# Patient Record
Sex: MALE | Race: WHITE | NOT HISPANIC OR LATINO | Employment: OTHER | URBAN - METROPOLITAN AREA
[De-identification: names, ages, dates, MRNs, and addresses within clinical notes are randomized per-mention and may not be internally consistent; named-entity substitution may affect disease eponyms.]

---

## 2022-02-03 ENCOUNTER — TELEPHONE (OUTPATIENT)
Dept: PSYCHOLOGY | Facility: CLINIC | Age: 67
End: 2022-02-03
Payer: COMMERCIAL

## 2022-02-03 NOTE — TELEPHONE ENCOUNTER
[facility] Telephone Intake    Date:  February 3, 2022  Client Name:  Ramin Murcia  MRN:  [id number]   :  1955       Age:  66 year old     Suggested Program:  [facility]

## 2022-02-06 ENCOUNTER — HEALTH MAINTENANCE LETTER (OUTPATIENT)
Age: 67
End: 2022-02-06

## 2022-02-16 ENCOUNTER — TELEPHONE (OUTPATIENT)
Dept: PSYCHOLOGY | Facility: CLINIC | Age: 67
End: 2022-02-16
Payer: COMMERCIAL

## 2022-02-28 ENCOUNTER — VIRTUAL VISIT (OUTPATIENT)
Dept: PSYCHOLOGY | Facility: CLINIC | Age: 67
End: 2022-02-28
Payer: COMMERCIAL

## 2022-02-28 ENCOUNTER — TELEPHONE (OUTPATIENT)
Dept: PSYCHIATRY | Facility: CLINIC | Age: 67
End: 2022-02-28

## 2022-02-28 DIAGNOSIS — F91.8 CONDUCT DISORDER, UNDIFFERENTIATED TYPE: Primary | ICD-10-CM

## 2022-02-28 PROCEDURE — 90791 PSYCH DIAGNOSTIC EVALUATION: CPT | Mod: GT | Performed by: PSYCHOLOGIST

## 2022-02-28 PROCEDURE — 99207 PR NO CHARGE LOS: CPT | Performed by: PSYCHOLOGIST

## 2022-02-28 NOTE — TELEPHONE ENCOUNTER
Patient called at the request of Dr. Bacon to schedule new consult with Quan Richards.     This writer instructed patient to work with MA to schedule and complete any additional paperwork.

## 2022-03-03 ENCOUNTER — TELEPHONE (OUTPATIENT)
Dept: PSYCHIATRY | Facility: CLINIC | Age: 67
End: 2022-03-03
Payer: COMMERCIAL

## 2022-03-06 ENCOUNTER — MEDICAL CORRESPONDENCE (OUTPATIENT)
Dept: HEALTH INFORMATION MANAGEMENT | Facility: CLINIC | Age: 67
End: 2022-03-06
Payer: COMMERCIAL

## 2022-03-14 ENCOUNTER — VIRTUAL VISIT (OUTPATIENT)
Dept: PSYCHOLOGY | Facility: CLINIC | Age: 67
End: 2022-03-14
Payer: COMMERCIAL

## 2022-03-14 DIAGNOSIS — F91.8 CONDUCT DISORDER, UNDIFFERENTIATED TYPE: Primary | ICD-10-CM

## 2022-03-14 PROCEDURE — 99207 PR NO CHARGE LOS: CPT | Performed by: PSYCHOLOGIST

## 2022-03-14 PROCEDURE — 90837 PSYTX W PT 60 MINUTES: CPT | Performed by: PSYCHOLOGIST

## 2022-03-21 NOTE — PROGRESS NOTES
[facility] -  Case Progress Note    Date of Service: Mar 14, 2022  Client Name: Ramin Murcia  YOB: 1955  MRN:  [id number]  Treating Provider: Yenifer Bacon, PhD RADHA  Type of Session: Individual  Present in Session: 3:00  Number of Minutes:  3:55    In person.    Current Symptoms/Status:  Patient still struggling with daily urges.  Feels that he comes close to boundary violations.  Has not been able to stay within some boundaries.    Progress Toward Treatment Goals:   Just starting treatment.    Intervention: Modality and Description:  Interpersonal, relapse presention, CBT    Response to Intervention:  Continued to explore dynamics related to his CSB.  Seems that his half-way has caused existential crisis.  Lost purpose and meaning.  Finds himsle searching for his youthful self.  Talked about ways to gain impulse control in the short term.  Has appointment with Quan Richards.    Assignment:  Read Daring Greatly.    Interactive Complexity:  There are four specific communication difficulties that complicate the work of the primary psychiatric procedure.  Interactive complexity (+81087) may be reported when at least one of these difficulties is present.    Communication difficulties present during current the psychiatric procedure include:  1. None.    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.8) Other Specified Disruptive, Impulse Control, and Conduct Disorder (Hypersexual Disorder)      Plan / Need for Future Services:  Return for therapy in 2 weeks.      Yenifer Bacon, PhD LP

## 2022-03-24 ENCOUNTER — VIRTUAL VISIT (OUTPATIENT)
Dept: PSYCHIATRY | Facility: CLINIC | Age: 67
End: 2022-03-24
Payer: COMMERCIAL

## 2022-03-24 DIAGNOSIS — F91.8 CONDUCT DISORDER, UNDIFFERENTIATED TYPE: Primary | ICD-10-CM

## 2022-03-24 PROCEDURE — 99203 OFFICE O/P NEW LOW 30 MIN: CPT | Mod: GT | Performed by: PHYSICIAN ASSISTANT

## 2022-03-24 RX ORDER — NALTREXONE HYDROCHLORIDE 50 MG/1
TABLET, FILM COATED ORAL
Qty: 30 TABLET | Refills: 1 | Status: SHIPPED | OUTPATIENT
Start: 2022-03-24 | End: 2022-05-25

## 2022-03-24 RX ORDER — TADALAFIL 5 MG/1
1 TABLET ORAL DAILY
COMMUNITY
Start: 2021-09-01 | End: 2024-03-19

## 2022-03-24 ASSESSMENT — PATIENT HEALTH QUESTIONNAIRE - PHQ9
5. POOR APPETITE OR OVEREATING: SEVERAL DAYS
SUM OF ALL RESPONSES TO PHQ QUESTIONS 1-9: 3

## 2022-03-24 ASSESSMENT — ANXIETY QUESTIONNAIRES
3. WORRYING TOO MUCH ABOUT DIFFERENT THINGS: NOT AT ALL
5. BEING SO RESTLESS THAT IT IS HARD TO SIT STILL: SEVERAL DAYS
6. BECOMING EASILY ANNOYED OR IRRITABLE: SEVERAL DAYS
GAD7 TOTAL SCORE: 3
2. NOT BEING ABLE TO STOP OR CONTROL WORRYING: NOT AT ALL
1. FEELING NERVOUS, ANXIOUS, OR ON EDGE: NOT AT ALL
7. FEELING AFRAID AS IF SOMETHING AWFUL MIGHT HAPPEN: NOT AT ALL

## 2022-03-25 ASSESSMENT — ANXIETY QUESTIONNAIRES: GAD7 TOTAL SCORE: 3

## 2022-03-25 NOTE — PATIENT INSTRUCTIONS
1)Naltrexone 50 mg: Take 1/2 tablet daily for 2 weeks, then take 1 tablet daily.     2)See me in 1 month.  Contact the clinic with any questions or concerns.
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
## 2022-04-04 ENCOUNTER — PSYCHE (OUTPATIENT)
Dept: PSYCHOLOGY | Facility: CLINIC | Age: 67
End: 2022-04-04
Payer: COMMERCIAL

## 2022-04-04 DIAGNOSIS — F91.8 CONDUCT DISORDER, UNDIFFERENTIATED TYPE: Primary | ICD-10-CM

## 2022-04-04 PROCEDURE — 99207 PR NO CHARGE LOS: CPT | Performed by: PSYCHOLOGIST

## 2022-04-04 PROCEDURE — 90837 PSYTX W PT 60 MINUTES: CPT | Performed by: PSYCHOLOGIST
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
## 2022-04-25 ENCOUNTER — PSYCHE (OUTPATIENT)
Dept: PSYCHOLOGY | Facility: CLINIC | Age: 67
End: 2022-04-25
Payer: COMMERCIAL

## 2022-04-25 DIAGNOSIS — F91.8 CONDUCT DISORDER, UNDIFFERENTIATED TYPE: Primary | ICD-10-CM

## 2022-04-25 PROCEDURE — 90837 PSYTX W PT 60 MINUTES: CPT | Performed by: PSYCHOLOGIST

## 2022-04-25 PROCEDURE — 99207 PR NO CHARGE LOS: CPT | Performed by: PSYCHOLOGIST

## 2022-04-28 ENCOUNTER — VIRTUAL VISIT (OUTPATIENT)
Dept: PSYCHIATRY | Facility: CLINIC | Age: 67
End: 2022-04-28
Payer: COMMERCIAL

## 2022-04-28 DIAGNOSIS — F91.8 CONDUCT DISORDER, UNDIFFERENTIATED TYPE: Primary | ICD-10-CM

## 2022-04-28 PROCEDURE — 99214 OFFICE O/P EST MOD 30 MIN: CPT | Mod: GT | Performed by: PHYSICIAN ASSISTANT

## 2022-04-28 ASSESSMENT — ANXIETY QUESTIONNAIRES
5. BEING SO RESTLESS THAT IT IS HARD TO SIT STILL: NOT AT ALL
1. FEELING NERVOUS, ANXIOUS, OR ON EDGE: SEVERAL DAYS
3. WORRYING TOO MUCH ABOUT DIFFERENT THINGS: NOT AT ALL
7. FEELING AFRAID AS IF SOMETHING AWFUL MIGHT HAPPEN: NOT AT ALL
GAD7 TOTAL SCORE: 2
7. FEELING AFRAID AS IF SOMETHING AWFUL MIGHT HAPPEN: NOT AT ALL
GAD7 TOTAL SCORE: 2
6. BECOMING EASILY ANNOYED OR IRRITABLE: SEVERAL DAYS
2. NOT BEING ABLE TO STOP OR CONTROL WORRYING: NOT AT ALL
4. TROUBLE RELAXING: NOT AT ALL

## 2022-04-28 ASSESSMENT — PATIENT HEALTH QUESTIONNAIRE - PHQ9
SUM OF ALL RESPONSES TO PHQ QUESTIONS 1-9: 3
10. IF YOU CHECKED OFF ANY PROBLEMS, HOW DIFFICULT HAVE THESE PROBLEMS MADE IT FOR YOU TO DO YOUR WORK, TAKE CARE OF THINGS AT HOME, OR GET ALONG WITH OTHER PEOPLE: SOMEWHAT DIFFICULT
SUM OF ALL RESPONSES TO PHQ QUESTIONS 1-9: 3

## 2022-04-29 ASSESSMENT — ANXIETY QUESTIONNAIRES: GAD7 TOTAL SCORE: 2

## 2022-04-29 ASSESSMENT — PATIENT HEALTH QUESTIONNAIRE - PHQ9: SUM OF ALL RESPONSES TO PHQ QUESTIONS 1-9: 3

## 2022-04-29 NOTE — PATIENT INSTRUCTIONS
1)Naltrexone 50 mg: Continue 1 tablet daily.  I recommend taking it at bedtime.     2)See me in 3 months.  Contact my office with any questions or concerns.

## 2022-04-29 NOTE — PROGRESS NOTES
Video start time: 730  Video end time: 740    Telemedicine Visit: The patient's condition can be safely assessed and treated via synchronous audio and visual telemedicine encounter.      Reason for Telemedicine Visit: Services only offered telehealth    Originating Site (Patient Location): Patient's home    Distant Site (Provider Location): Provider Remote Setting- Home Office    Consent:  The patient/guardian has verbally consented to: the potential risks and benefits of telemedicine (video visit) versus in person care; bill my insurance or make self-payment for services provided; and responsibility for payment of non-covered services.     Mode of Communication:  Video Conference via [employer]    As the provider I attest to compliance with applicable laws and regulations related to telemedicine.    [facility] Management    Name:  Darlene Murcia   Aliases:  DARLENE MURCIA  :  1955   MRN:  [id number]  Treating Provider: Quan Richards PA-C EdD     Medications at start of visit:  Outpatient Medications Prior to Visit   Medication Sig Dispense Refill     naltrexone (DEPADE/REVIA) 50 MG tablet Take 1/2 tablet daily for 2 weeks, then take 1 tablet daily 30 tablet 1     tadalafil (CIALIS) 5 MG tablet Take 1 tablet by mouth daily       No facility-administered medications prior to visit.       Allergies:  No Known Allergies    Today's Visit    Current Complaint: Darlene presents for a recheck.  At their last appointment we initiated naltrexone, and he is reporting a good response. He is having some dizziness and fatigue, however they are mostly manageable. He has also experienced some delayed ejaculation. The patient is not endorsing suicidal/homicidal ideation, active planning, intent or means.  The patient is not endorsing s/s suggestive of hypomania/gera or psychosis.  The patient is endorsing good compliance with and efficacy of their medication regimen.     Review of Systems: A review of the following systems was  negative: Opthalmic, ENT, Cardiovascular, Gastrointestinal, Genitourinary, Musculoskeletal, Integumentary, Neurological, Endocrine, Respiratory, Hematologic, Immunologic      Chemical History: Denies usage of and cravings for alcohol, cannabis, heroin, methamphetamine, cocaine, prescription opioids/benzodiazepines.      Social History: No interval change    Mental Status Exam: The patient's orientation, memory,  Attention, language and fund of knowledge are at their usual best baseline.  Grooming/Hygiene: adequate  Eye Contact: normal  Psychomotor: normal  Observed mood and affect: appropriate  Judgment: intact, with thoughtful decision making and insight  Speech: normal rate, rhythm, tone and volume  Thought processes: normal, with normal rate of thought  Associations:  No deficiency  Abnormal Thoughts: none      Assessment: This is a 65 yo man who carries the diagnosis of Conduct Disorder. The patient's questions were answered to their satisfaction regarding the plan as outlined below. Side effects, risks/benefits/alternatives regarding all psychiatric medications were discussed with the patient in detail.          Plan:    There are no Patient Instructions on file for this visit.     Total face-to-face time: 30 min    Counseling/Coordination of care greater than 50%.    Counseling/Coordination of care included: Educational counseling regarding psychiatric medications, side effects, interactions.                           Answers for HPI/ROS submitted by the patient on 4/28/2022  If you checked off any problems, how difficult have these problems made it for you to do your work, take care of things at home, or get along with other people?: Somewhat difficult  PHQ9 TOTAL SCORE: 3  OYNY 7 TOTAL SCORE: 2
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
## 2022-05-06 ENCOUNTER — MYC MEDICAL ADVICE (OUTPATIENT)
Dept: FAMILY MEDICINE | Facility: CLINIC | Age: 67
End: 2022-05-06
Payer: COMMERCIAL

## 2022-05-09 ENCOUNTER — PSYCHE (OUTPATIENT)
Dept: PSYCHOLOGY | Facility: CLINIC | Age: 67
End: 2022-05-09
Payer: COMMERCIAL

## 2022-05-09 DIAGNOSIS — F43.23 ADJUSTMENT DISORDER WITH MIXED ANXIETY AND DEPRESSED MOOD: ICD-10-CM

## 2022-05-09 DIAGNOSIS — F91.8 CONDUCT DISORDER, UNDIFFERENTIATED TYPE: Primary | ICD-10-CM

## 2022-05-09 PROCEDURE — 90837 PSYTX W PT 60 MINUTES: CPT | Performed by: PSYCHOLOGIST

## 2022-05-09 PROCEDURE — 99207 PR NO CHARGE LOS: CPT | Performed by: PSYCHOLOGIST

## 2022-05-09 NOTE — PROGRESS NOTES
[facility] -  Case Progress Note    5/09/22    Client Name: Ramin Murcia  YOB: 1955  MRN:  [id number]  Treating Provider: Yenifer Bacon, PhD LP  Type of Session: Conjoint  Present in Session: Patient and wife   Time: 60 min.    In person.    Current Symptoms/Status:  Patient has lesswith daily urges.  Feels significant improvement since starting Naltrexone.    Now taking 50 mg.  Reduced urges.  Feels less antsy.    Progress Toward Treatment Goals:   Just starting treatment.    Intervention: Modality and Description:  Interpersonal, relapse presention, CBT    Response to Intervention:  Continued to explore dynamics related to his CSB.  Involved partner in this conjoint session.  Was able to identify earlier symptoms.  Triggered by miscarriage, jail.    Talked about tx plan.  Is ready to join group.    Notes his narcissistic traits.      Started naltrexone and has noticed signficant improvement.    Still feels that is improving.    Looks at self at the mirror - always been like that.  Compulsion    Screen time - all the time.  Compulsive.    Drinking - still have issues with it.  Increased over time.    Family history of impulse control tanner. With aging.    Is structuring his time.  Building support system.     How much should he go into issues with father?  Next youngest brother - was also abused.  Guilt about not protecting him.    MENTAL STATUS EXAM  Appearance: appropriate  Attitude: cooperative  Behavior: normal  Eyre Contact: normal  Speech: normal  Orientation: oreinted to person , place, time and situation  Mood:  admits sadness and anxiety most of time  Affect: Mood Congruient  Thought Process: clear  Suicidal Ideation: none  Hallucination: no    Assignment:  Read Daring Greatly.    Interactive Complexity:  There are four specific communication difficulties that complicate the work of the primary psychiatric procedure.  Interactive complexity (+01128) may be reported when at  least one of these difficulties is present.    Communication difficulties present during current the psychiatric procedure include:  1. None.    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.8) Other Specified Disruptive, Impulse Control, and Conduct Disorder (Hypersexual Disorder)    Adjustment Disorder with mixed emotional  features    TREATMENT PLAN  Date of Treatment Plan 22  Name: Ramin Murcia  : 1955  Medical Record Number: [id number]  Treating Provider: Yenifer Bacon, PhD      Current Status:  Depression/Mood:  Sad  Increased Crying  Decreased Interest  Decreased Self-Esteem  Guilt  Hopelessness/Helplessness  Dysphoria    Anxiety/Panic:  Worry  Intrusive Thoughts    Thought:   Reports no problems or symptoms at this time    Sensorium:  Reports no problems or symptoms at this time    Behavior/Health:  Compulsive  Relationship Problems    Chemical Use:  Denies both Alcohol and Drug Abuse    Sexual Problems:  Compulsive sexual behavior    Suicide Risk Assessment:  Assessed Level of Immediate Risk:  YES  Ideation:  NO  Plan:  NO  Means:  NO  Intent:  YES    Homicide Risk Assessment:  Assessed Level of Immediate Risk:  NO  Ideation:  NO  Plan:  NO  Means:  NO  Intent:  NO    Impact of Symptoms on Function:  Decreased Social/Family Function    DSM-5 Diagnoses:       Screening Questionnaires:  Please indicate whether the following screening questionnaires have been completed:  CAGE: Yes  PHQ-9: Yes    YONY-7: Yes  WHODAS: No    Problem(s):  1. Compulsive sexual behavior  2. Depresssion  3. Existential crisis    Short-Long Term Goals  Decrease Symptoms  Increase Coping  Change Behavior  Change Cognitions  Increase Psychosocial/Support Functioning  Monitor Psych Status  Improve Communication  Enhance Relationships    Interventions  [place] [facility]  Cognitive-Behavioral Therapy  Medication Management Referral  Bibliotherapy    Expected Outcomes and Prognosis  Expected improvement    Anticipated Treatment  Duration:  Unknown    Frequency of Sessions  2 x / Month    Progress Update (for Plan Update Only)  NA:  1st Treatment Planning    Current Psychoactive Medications:  See Psychiatric Treatment Plan  Discharge & Aftercare Goals: To Be Determined.  Care Coordination: Yes    Consent to Treatment:   Patient participated in this treatment planning process and indicated verbal agreement with the above treatment plan.  If patient doesn't sign, indicate why: Telehealth visit due to COVID19 pandemic    Patient Signature: Ramin Murcia   Date: 5-9-22       Provider Signature: Yenifer Bacon, PhD,   Date: 5-9-22

## 2022-05-23 DIAGNOSIS — F91.8 CONDUCT DISORDER, UNDIFFERENTIATED TYPE: ICD-10-CM

## 2022-05-23 RX ORDER — NALTREXONE HYDROCHLORIDE 50 MG/1
TABLET, FILM COATED ORAL
Qty: 90 TABLET | Refills: 0 | Status: CANCELLED | OUTPATIENT
Start: 2022-05-23

## 2022-05-23 NOTE — TELEPHONE ENCOUNTER
Requested Medication: Naltrexpone  Dose:  50mg  Quantity: 90  Refills: 0    Take 1 tablet daily    Last seen at [facility]: 4/28 - 3 mo follow up  -   Next Appointment with Provider: 8/4        Dior Mike CMA

## 2022-05-25 DIAGNOSIS — F91.8 CONDUCT DISORDER, UNDIFFERENTIATED TYPE: ICD-10-CM

## 2022-05-25 RX ORDER — NALTREXONE HYDROCHLORIDE 50 MG/1
TABLET, FILM COATED ORAL
Qty: 90 TABLET | Refills: 0 | Status: SHIPPED | OUTPATIENT
Start: 2022-05-25 | End: 2022-06-23

## 2022-05-25 NOTE — TELEPHONE ENCOUNTER
Requested Medication:Naltrexone 50mg  Dose: 50mg  Quantity:90  Refills: 0    Take 1 tablet daily    Last seen at [facility]: 428/22 -  Next Appointment with Provider: 8/4    Patient requesting a 90 day refill. Rx changed to reflect request      Dior Mike CMA

## 2022-06-13 ENCOUNTER — OFFICE VISIT (OUTPATIENT)
Dept: PSYCHOLOGY | Facility: CLINIC | Age: 67
End: 2022-06-13
Payer: COMMERCIAL

## 2022-06-13 DIAGNOSIS — F43.23 ADJUSTMENT DISORDER WITH MIXED ANXIETY AND DEPRESSED MOOD: ICD-10-CM

## 2022-06-13 DIAGNOSIS — F91.8 CONDUCT DISORDER, UNDIFFERENTIATED TYPE: Primary | ICD-10-CM

## 2022-06-13 PROCEDURE — 90837 PSYTX W PT 60 MINUTES: CPT | Performed by: PSYCHOLOGIST

## 2022-06-13 PROCEDURE — 99207 PR NO CHARGE LOS: CPT | Performed by: PSYCHOLOGIST

## 2022-06-13 NOTE — PROGRESS NOTES
[facility] -  Case Progress Note    6/13/22    Client Name: Ramin Murcia  YOB: 1955  MRN:  [id number]  Treating Provider: Yenifer Bacon, PhD LP  Type of Session: Individual  Present in Session: Patient   Time: 55 min.    In person.    Current Symptoms/Status:  Patient continues with less with daily urges.  Feels significant improvement since starting Naltrexone.    Now taking 50 mg.  Reduced urges.  Feels less antsy.    Progress Toward Treatment Goals:   Just starting treatment.  Feeling much better.  Less urges. Staying within boundaries.    Intervention: Modality and Description:  Interpersonal, relapse presention, CBT    Response to Intervention:  Continued to explore dynamics related to his CSB.     Has been working on mentoring and involved   Giving back to community.    Talked about tx plan.  Is ready to join group.    Screen time - all the time.  Seems to be a little better.  Encouraged working on that more.  Compulsive.    Drinking - still have issues with it.  Increased over time.    How much should he go into issues with father?  Next youngest brother - was also abused.  Guilt about not protecting him.  Tried to talk to uncle but he was unresponsive.    MENTAL STATUS EXAM  Appearance: appropriate  Attitude: cooperative  Behavior: normal  Eyre Contact: normal  Speech: normal  Orientation: oreinted to person , place, time and situation  Mood:  admits sadness and anxiety most of time  Affect: Mood Congruient  Thought Process: clear  Suicidal Ideation: none  Hallucination: no    Assignment:  Read Daring Greatly.    Interactive Complexity:  There are four specific communication difficulties that complicate the work of the primary psychiatric procedure.  Interactive complexity (+97389) may be reported when at least one of these difficulties is present.    Communication difficulties present during current the psychiatric procedure include:  1. None.    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.8) Other  Specified Disruptive, Impulse Control, and Conduct Disorder (Hypersexual Disorder)    Adjustment Disorder with mixed emotional  features    TREATMENT PLAN  Date of Treatment Plan 22  Name: Ramin Murcia  : 1955  Medical Record Number: [id number]  Treating Provider: Yenifer Bacon, PhD      Current Status:  Depression/Mood:  Sad  Increased Crying  Decreased Interest  Decreased Self-Esteem  Guilt  Hopelessness/Helplessness  Dysphoria    Anxiety/Panic:  Worry  Intrusive Thoughts    Thought:   Reports no problems or symptoms at this time    Sensorium:  Reports no problems or symptoms at this time    Behavior/Health:  Compulsive  Relationship Problems    Chemical Use:  Denies both Alcohol and Drug Abuse    Sexual Problems:  Compulsive sexual behavior    Suicide Risk Assessment:  Assessed Level of Immediate Risk:  YES  Ideation:  NO  Plan:  NO  Means:  NO  Intent:  YES    Homicide Risk Assessment:  Assessed Level of Immediate Risk:  NO  Ideation:  NO  Plan:  NO  Means:  NO  Intent:  NO    Impact of Symptoms on Function:  Decreased Social/Family Function    DSM-5 Diagnoses:       Screening Questionnaires:  Please indicate whether the following screening questionnaires have been completed:  CAGE: Yes  PHQ-9: Yes    YONY-7: Yes  WHODAS: No    Problem(s):  1. Compulsive sexual behavior  2. Depresssion  3. Existential crisis    Short-Long Term Goals  Decrease Symptoms  Increase Coping  Change Behavior  Change Cognitions  Increase Psychosocial/Support Functioning  Monitor Psych Status  Improve Communication  Enhance Relationships    Interventions  [place] [facility]  Cognitive-Behavioral Therapy  Medication Management Referral  Bibliotherapy    Expected Outcomes and Prognosis  Expected improvement    Anticipated Treatment Duration:  Unknown    Frequency of Sessions  2 x / Month    Progress Update (for Plan Update Only)  NA:  1st Treatment Planning    Current Psychoactive Medications:  See Psychiatric Treatment  Plan  Discharge & Aftercare Goals: To Be Determined.  Care Coordination: Yes    Consent to Treatment:   Patient participated in this treatment planning process and indicated verbal agreement with the above treatment plan.  If patient doesn't sign, indicate why: Telehealth visit due to COVID19 pandemic    Patient Signature: Ramin Murcia   Date: 5-9-22       Provider Signature: Yenifer Bacon, PhD, LP  Date: 5-9-22

## 2022-06-23 DIAGNOSIS — F91.8 CONDUCT DISORDER, UNDIFFERENTIATED TYPE: ICD-10-CM

## 2022-06-23 RX ORDER — NALTREXONE HYDROCHLORIDE 50 MG/1
TABLET, FILM COATED ORAL
Qty: 90 TABLET | Refills: 0 | Status: SHIPPED | OUTPATIENT
Start: 2022-06-23 | End: 2022-08-03

## 2022-06-23 NOTE — TELEPHONE ENCOUNTER
Pharmacy states they never received the 5/25/22 Naltrexone prescription.  Please resend.    Dior Mike,CMA

## 2022-07-05 ENCOUNTER — VIRTUAL VISIT (OUTPATIENT)
Dept: PSYCHOLOGY | Facility: CLINIC | Age: 67
End: 2022-07-05
Payer: COMMERCIAL

## 2022-07-05 DIAGNOSIS — F43.23 ADJUSTMENT DISORDER WITH MIXED ANXIETY AND DEPRESSED MOOD: Primary | ICD-10-CM

## 2022-07-05 DIAGNOSIS — F91.8 CONDUCT DISORDER, UNDIFFERENTIATED TYPE: ICD-10-CM

## 2022-07-05 PROCEDURE — 99207 PR NO CHARGE LOS: CPT | Performed by: PSYCHOLOGIST

## 2022-07-05 PROCEDURE — 90853 GROUP PSYCHOTHERAPY: CPT | Mod: GT | Performed by: PSYCHOLOGIST

## 2022-07-05 NOTE — PROGRESS NOTES
" [facility] -  Case Progress Note     7/05/22       Client Name: Ramin Murcia  YOB: 1955  MRN:  [id number]  Treating Provider: Yenifer Bacon, PhD LP  Type of Session: Individual  Present in Session: Patient   Time: 55 min.     Video start time: 1:00  Video end time: 1:55    Telemedicine Visit: The patient's condition can be safely assessed and treated via synchronous audio and visual telemedicine encounter.      Reason for Telemedicine Visit: Patient has requested telehealth visit    Originating Site (Patient Location): Patient's home    Distant Site (Provider Location): Provider Remote Setting- Home Office    Consent:  The patient/guardian has verbally consented to: the potential risks and benefits of telemedicine (video visit) versus in person care; bill my insurance or make self-payment for services provided; and responsibility for payment of non-covered services.     Mode of Communication:  Video Conference via [employer]    As the provider I attest to compliance with applicable laws and regulations related to telemedicine.         Current Symptoms/Status:  Patient continues with less with daily urges.  Feels significant improvement since starting Naltrexone.    Now taking 50 mg.  Reduced urges.  Feels less antsy.     Progress Toward Treatment Goals:   Feeling much better. Understanding the source of his hypersexuality.  Less urges. Staying within boundaries.     Intervention: Modality and Description:  Interpersonal, relapse presention, CBT     Response to Intervention:  Continued to explore dynamics related to his CSB.  Sees \"mid-life\" crisis - delayed.  Existential crisis.  Loss of purpose and meaning.     Has bene getting busier with boards, mentoring..     Talked about tx plan.  Is ready to join group.  Will decide shortly which group he can join.    Has connected with Dr. Cole.           How much should he go into issues with father?  Next youngest brother - was also " abused.  Guilt about not protecting him.  Tried to talk to uncle but he was unresponsive.     MENTAL STATUS EXAM  Appearance: appropriate  Attitude: cooperative  Behavior: normal  Eyre Contact: normal  Speech: normal  Orientation: oreinted to person , place, time and situation  Mood:  admits sadness and anxiety most of time  Affect: Mood Congruient  Thought Process: clear  Suicidal Ideation: none  Hallucination: no     Assignment:  Read Daring Greatly.     Interactive Complexity:  There are four specific communication difficulties that complicate the work of the primary psychiatric procedure.  Interactive complexity (+72483) may be reported when at least one of these difficulties is present.     Communication difficulties present during current the psychiatric procedure include:  1. None.     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.8) Other Specified Disruptive, Impulse Control, and Conduct Disorder (Hypersexual Disorder)    Adjustment Disorder with mixed emotional  features     TREATMENT PLAN  Date of Treatment Plan 22  Name: Ramin Murcia  : 1955  Medical Record Number: [id number]  Treating Provider: Yenifer Bacon, PhD        Current Status:  Depression/Mood:  Sad  Increased Crying  Decreased Interest  Decreased Self-Esteem  Guilt  Hopelessness/Helplessness  Dysphoria     Anxiety/Panic:  Worry  Intrusive Thoughts     Thought:   Reports no problems or symptoms at this time     Sensorium:  Reports no problems or symptoms at this time     Behavior/Health:  Compulsive  Relationship Problems     Chemical Use:  Denies both Alcohol and Drug Abuse     Sexual Problems:  Compulsive sexual behavior     Suicide Risk Assessment:  Assessed Level of Immediate Risk:  YES  Ideation:  NO  Plan:  NO  Means:  NO  Intent:  YES     Homicide Risk Assessment:  Assessed Level of Immediate Risk:  NO  Ideation:  NO  Plan:  NO  Means:  NO  Intent:  NO     Impact of Symptoms on Function:  Decreased Social/Family Function     DSM-5 Diagnoses:          Screening Questionnaires:  Please indicate whether the following screening questionnaires have been completed:  CAGE: Yes  PHQ-9: Yes    YONY-7: Yes  WHODAS: No     Problem(s):  1. Compulsive sexual behavior  2. Depresssion  3. Existential crisis     Short-Long Term Goals  Decrease Symptoms  Increase Coping  Change Behavior  Change Cognitions  Increase Psychosocial/Support Functioning  Monitor Psych Status  Improve Communication  Enhance Relationships     Interventions  [place] [facility]  Cognitive-Behavioral Therapy  Medication Management Referral  Bibliotherapy     Expected Outcomes and Prognosis  Expected improvement     Anticipated Treatment Duration:  Unknown     Frequency of Sessions  2 x / Month     Progress Update (for Plan Update Only)  NA:  1st Treatment Planning     Current Psychoactive Medications:  See Psychiatric Treatment Plan  Discharge & Aftercare Goals: To Be Determined.  Care Coordination: Yes     Consent to Treatment:   Patient participated in this treatment planning process and indicated verbal agreement with the above treatment plan.  If patient doesn't sign, indicate why: Telehealth visit due to COVID19 pandemic     Patient Signature: Ramin Murcia   Date: 5-9-22        Provider Signature: Yenifer Bacon, PhD,   Date: 5-9-22

## 2022-07-14 ENCOUNTER — VIRTUAL VISIT (OUTPATIENT)
Dept: PSYCHOLOGY | Facility: CLINIC | Age: 67
End: 2022-07-14
Payer: COMMERCIAL

## 2022-07-14 DIAGNOSIS — F43.23 ADJUSTMENT DISORDER WITH MIXED ANXIETY AND DEPRESSED MOOD: ICD-10-CM

## 2022-07-14 DIAGNOSIS — F91.8 CONDUCT DISORDER, UNDIFFERENTIATED TYPE: Primary | ICD-10-CM

## 2022-07-14 PROCEDURE — 99207 PR NO CHARGE LOS: CPT

## 2022-07-14 PROCEDURE — 90853 GROUP PSYCHOTHERAPY: CPT | Mod: GT

## 2022-07-14 NOTE — PROGRESS NOTES
[facility] -  Case Progress Note     7/14/22       Client Name: Ramin Murcia  YOB: 1955  MRN:  [id number]  Treating Provider: Yenifer Bacon, PhD LP  Type of Session:Group       Video start time: 4:00  Video end time: 6:00    Telemedicine Visit: The patient's condition can be safely assessed and treated via synchronous audio and visual telemedicine encounter.      Reason for Telemedicine Visit: Patient has requested telehealth visit    Originating Site (Patient Location): Patient's home    Distant Site (Provider Location): Provider Remote Setting- Home Office    Consent:  The patient/guardian has verbally consented to: the potential risks and benefits of telemedicine (video visit) versus in person care; bill my insurance or make self-payment for services provided; and responsibility for payment of non-covered services.     Mode of Communication:  Video Conference via [employer]    As the provider I attest to compliance with applicable laws and regulations related to telemedicine.    Current Symptoms/Status:  Patient continues with less with daily urges.  Feels significant improvement since starting Naltrexone.    Now taking 50 mg.  Reduced urges.  Feels less antsy.     Progress Toward Treatment Goals:   Staying within boundaries. Some urges but managing them.     Intervention: Modality and Description:  Interpersonal, relapse prevention, CBT     Response to Intervention:  Processed how he is exploring antecedents to CSB and factors contributing to compulsivity. He reflected on family dynamics, particularly with his father, that contributed to issues with power/control. He shared adverse childhood experiences that may contribute to core fuels. He shared feedback with group members, was receptive to feedback, and maintained attention throughout group.      MENTAL STATUS EXAM  Appearance: appropriate  Attitude: cooperative  Behavior: normal  Eyre Contact: normal  Speech: normal  Orientation:  oreinted to person , place, time and situation  Mood:  admits sadness and anxiety most of time  Affect: Mood Congruient  Thought Process: clear  Suicidal Ideation: none  Hallucination: no     Assignment:  Read Daring Greatly.     Interactive Complexity:  There are four specific communication difficulties that complicate the work of the primary psychiatric procedure.  Interactive complexity (+75067) may be reported when at least one of these difficulties is present.     Communication difficulties present during current the psychiatric procedure include:  1. None.     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.8) Other Specified Disruptive, Impulse Control, and Conduct Disorder (Hypersexual Disorder)    Adjustment Disorder with mixed emotional  features     TREATMENT PLAN  Date of Treatment Plan 22  Name: Ramin Murcia  : 1955  Medical Record Number: [id number]  Treating Provider: Yenifer Bacon, PhD        Current Status:  Depression/Mood:  Sad  Increased Crying  Decreased Interest  Decreased Self-Esteem  Guilt  Hopelessness/Helplessness  Dysphoria     Anxiety/Panic:  Worry  Intrusive Thoughts     Thought:   Reports no problems or symptoms at this time     Sensorium:  Reports no problems or symptoms at this time     Behavior/Health:  Compulsive  Relationship Problems     Chemical Use:  Denies both Alcohol and Drug Abuse     Sexual Problems:  Compulsive sexual behavior     Suicide Risk Assessment:  Assessed Level of Immediate Risk:  YES  Ideation:  NO  Plan:  NO  Means:  NO  Intent:  YES     Homicide Risk Assessment:  Assessed Level of Immediate Risk:  NO  Ideation:  NO  Plan:  NO  Means:  NO  Intent:  NO     Impact of Symptoms on Function:  Decreased Social/Family Function     DSM-5 Diagnoses:         Screening Questionnaires:  Please indicate whether the following screening questionnaires have been completed:  CAGE: Yes  PHQ-9: Yes    YONY-7: Yes  WHODAS: No     Problem(s):  1. Compulsive sexual behavior  2. Depresssion  3.  Existential crisis     Short-Long Term Goals  Decrease Symptoms  Increase Coping  Change Behavior  Change Cognitions  Increase Psychosocial/Support Functioning  Monitor Psych Status  Improve Communication  Enhance Relationships     Interventions  [place] [facility]  Cognitive-Behavioral Therapy  Medication Management Referral  Bibliotherapy     Expected Outcomes and Prognosis  Expected improvement     Anticipated Treatment Duration:  Unknown     Frequency of Sessions  2 x / Month     Progress Update (for Plan Update Only)  NA:  1st Treatment Planning     Current Psychoactive Medications:  See Psychiatric Treatment Plan  Discharge & Aftercare Goals: To Be Determined.  Care Coordination: Yes     Consent to Treatment:   Patient participated in this treatment planning process and indicated verbal agreement with the above treatment plan.  If patient doesn't sign, indicate why: Telehealth visit due to COVID19 pandemic     Patient Signature: Ramin Mucria   Date: 5-9-22        Provider Signature: Yenifer Bacon, PhD, LP  Date: 5-9-22

## 2022-07-18 NOTE — PROGRESS NOTES
I did not personally attend the group therapy session.  I reviewed and agree with the assessment and plan as documented in this note.    Yenifer Bacon, Ph.D, LP

## 2022-07-21 ENCOUNTER — VIRTUAL VISIT (OUTPATIENT)
Dept: PSYCHOLOGY | Facility: CLINIC | Age: 67
End: 2022-07-21
Payer: COMMERCIAL

## 2022-07-21 DIAGNOSIS — F91.8 CONDUCT DISORDER, UNDIFFERENTIATED TYPE: Primary | ICD-10-CM

## 2022-07-21 DIAGNOSIS — F43.23 ADJUSTMENT DISORDER WITH MIXED ANXIETY AND DEPRESSED MOOD: ICD-10-CM

## 2022-07-21 PROCEDURE — 90853 GROUP PSYCHOTHERAPY: CPT | Mod: GT

## 2022-07-21 NOTE — PROGRESS NOTES
[facility]  [facility]  [address], [address]  [place]  [address]    Group Progress Note     7/21/22       Client Name: Ramin Murcia  YOB: 1955  MRN:  [id number]  Treating Provider: Mason Adkins, PhD  Type of Session:Group       Video start time: 4:00  Video end time: 6:00    Telemedicine Visit: The patient's condition can be safely assessed and treated via synchronous audio and visual telemedicine encounter.      Reason for Telemedicine Visit: Patient has requested telehealth visit    Originating Site (Patient Location): Patient's home    Distant Site (Provider Location): Provider Remote Setting- Home Office    Consent:  The patient/guardian has verbally consented to: the potential risks and benefits of telemedicine (video visit) versus in person care; bill my insurance or make self-payment for services provided; and responsibility for payment of non-covered services.     Mode of Communication:  Video Conference via [employer]    As the provider I attest to compliance with applicable laws and regulations related to telemedicine.    Current Symptoms/Status:  Patient continues with less with daily urges.  Feels significant improvement since starting Naltrexone.    Now taking 50 mg.  Started using alcohol recently, reports this is 2 glasses of wine per night. Expressed concern that this historically has led to boundary violations. Reported urges, denied boundary violations.     Progress Toward Treatment Goals:   Staying within boundaries, although he reported some urges. Recent alcohol use which is a risk situation for boundary violations.     Intervention: Modality and Description:  Interpersonal, relapse prevention, CBT     Response to Intervention:  Shared more antecedents/sexual history. Processed what he is exploring/recognizing regarding sexual orientation. Shared recent risk situations for boundary violations and processing how these  situations occurred. He reported communication with his wife about trust and he is concerned that his wife is re-examining their relationship. Client expressed concern that he thought he would be further along in treatment. Provided psychoeducation about the treatment model, including cycles and core fuels, and ongoing development of awareness. He shared feedback with group members, was receptive to feedback, and maintained attention throughout group.      MENTAL STATUS EXAM  Appearance: appropriate  Attitude: cooperative  Behavior: normal  Eye Contact: normal  Speech: normal  Orientation: oriented to person , place, time and situation  Mood:  admits sadness and anxiety most of time  Affect: Mood Congruent  Thought Process: clear  Suicidal Ideation: none  Hallucination: no     Assignment:  Read Daring Greatly.     Interactive Complexity:  There are four specific communication difficulties that complicate the work of the primary psychiatric procedure.  Interactive complexity (+07813) may be reported when at least one of these difficulties is present.     Communication difficulties present during current the psychiatric procedure include:  1. None.     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.8) Other Specified Disruptive, Impulse Control, and Conduct Disorder (Hypersexual Disorder)    Adjustment Disorder with mixed emotional  features

## 2022-07-28 ENCOUNTER — APPOINTMENT (OUTPATIENT)
Dept: PSYCHOLOGY | Facility: CLINIC | Age: 67
End: 2022-07-28
Payer: COMMERCIAL

## 2022-08-01 ENCOUNTER — OFFICE VISIT (OUTPATIENT)
Dept: PSYCHOLOGY | Facility: CLINIC | Age: 67
End: 2022-08-01
Payer: COMMERCIAL

## 2022-08-01 DIAGNOSIS — F91.8 CONDUCT DISORDER, UNDIFFERENTIATED TYPE: Primary | ICD-10-CM

## 2022-08-01 DIAGNOSIS — F91.8 CONDUCT DISORDER, UNDIFFERENTIATED TYPE: ICD-10-CM

## 2022-08-01 DIAGNOSIS — F43.23 ADJUSTMENT DISORDER WITH MIXED ANXIETY AND DEPRESSED MOOD: ICD-10-CM

## 2022-08-01 PROCEDURE — 90847 FAMILY PSYTX W/PT 50 MIN: CPT | Performed by: PSYCHOLOGIST

## 2022-08-01 NOTE — PROGRESS NOTES
[facility] -  Case Progress Note     8/01/22       Client Name: Ramin Murcia  YOB: 1955  MRN:  [id number]  Treating Provider: Yenifer Bacon, PhD LP  Type of Session:Family  Present in Session: Patient with wife  Time: 55 min.     In person     Current Symptoms/Status:  Patient continues with less with daily urges.  Feels significant improvement since starting Naltrexone.    Now taking 50 mg.  Reduced urges.  Feels less antsy.     Progress Toward Treatment Goals:   Feeling much better.Gaining further understanding the source of his hypersexuality.  Less urges. Staying within boundaries.     Intervention: Modality and Description:  Interpersonal, relapse presention, CBT, family systems.     Response to Intervention:  Concerns about drinking addressed.  New boundaries set.   Explored family dynamics related to CSB. Both felt better about tx plan.           How much should he go into issues with father?  Next youngest brother - was also abused.  Guilt about not protecting him.  Tried to talk to uncle but he was unresponsive.     MENTAL STATUS EXAM  Appearance: appropriate  Attitude: cooperative  Behavior: normal  Eyre Contact: normal  Speech: normal  Orientation: oreinted to person , place, time and situation  Mood:  admits sadness and anxiety most of time  Affect: Mood Congruient  Thought Process: clear  Suicidal Ideation: none  Hallucination: no     Assignment:  Read Daring Greatly.     Interactive Complexity:  There are four specific communication difficulties that complicate the work of the primary psychiatric procedure.  Interactive complexity (+37461) may be reported when at least one of these difficulties is present.     Communication difficulties present during current the psychiatric procedure include:  1. None.     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.8) Other Specified Disruptive, Impulse Control, and Conduct Disorder (Hypersexual Disorder)    Adjustment Disorder with mixed emotional   features     TREATMENT PLAN  Date of Treatment Plan 22  Name: Ramin Murcia  : 1955  Medical Record Number: [id number]  Treating Provider: Yenifer Bacon, PhD        Current Status:  Depression/Mood:  Sad  Increased Crying  Decreased Interest  Decreased Self-Esteem  Guilt  Hopelessness/Helplessness  Dysphoria     Anxiety/Panic:  Worry  Intrusive Thoughts     Thought:   Reports no problems or symptoms at this time     Sensorium:  Reports no problems or symptoms at this time     Behavior/Health:  Compulsive  Relationship Problems     Chemical Use:  Denies both Alcohol and Drug Abuse     Sexual Problems:  Compulsive sexual behavior     Suicide Risk Assessment:  Assessed Level of Immediate Risk:  YES  Ideation:  NO  Plan:  NO  Means:  NO  Intent:  YES     Homicide Risk Assessment:  Assessed Level of Immediate Risk:  NO  Ideation:  NO  Plan:  NO  Means:  NO  Intent:  NO     Impact of Symptoms on Function:  Decreased Social/Family Function     DSM-5 Diagnoses:         Screening Questionnaires:  Please indicate whether the following screening questionnaires have been completed:  CAGE: Yes  PHQ-9: Yes    YONY-7: Yes  WHODAS: No     Problem(s):  1. Compulsive sexual behavior  2. Depresssion  3. Existential crisis     Short-Long Term Goals  Decrease Symptoms  Increase Coping  Change Behavior  Change Cognitions  Increase Psychosocial/Support Functioning  Monitor Psych Status  Improve Communication  Enhance Relationships     Interventions  [facility]  Cognitive-Behavioral Therapy  Medication Management Referral  Bibliotherapy     Expected Outcomes and Prognosis  Expected improvement     Anticipated Treatment Duration:  Unknown     Frequency of Sessions  2 x / Month     Progress Update (for Plan Update Only)  NA:  1st Treatment Planning     Current Psychoactive Medications:  See Psychiatric Treatment Plan  Discharge & Aftercare Goals: To Be Determined.  Care Coordination: Yes     Consent to Treatment:    Patient participated in this treatment planning process and indicated verbal agreement with the above treatment plan.  If patient doesn't sign, indicate why: Telehealth visit due to COVID19 pandemic     Patient Signature: Ramin Murcia   Date: 5-9-22        Provider Signature: Yenifer Bacon, PhD,   Date: 5-9-22

## 2022-08-01 NOTE — TELEPHONE ENCOUNTER
New Pharmacy    Requested Medication:  Naltrexone 50mg  Dose: 50mg  Quantity: 90  Refills: 0    Take 1 tablet daily    Last seen at [facility]: 4/28 - 3 mo  Next Appointment with Provider: 8/4      Dior Mike CMA

## 2022-08-03 RX ORDER — NALTREXONE HYDROCHLORIDE 50 MG/1
TABLET, FILM COATED ORAL
Qty: 90 TABLET | Refills: 0 | Status: SHIPPED | OUTPATIENT
Start: 2022-08-03 | End: 2022-11-17

## 2022-08-04 ENCOUNTER — VIRTUAL VISIT (OUTPATIENT)
Dept: PSYCHIATRY | Facility: CLINIC | Age: 67
End: 2022-08-04
Payer: COMMERCIAL

## 2022-08-04 ENCOUNTER — VIRTUAL VISIT (OUTPATIENT)
Dept: PSYCHOLOGY | Facility: CLINIC | Age: 67
End: 2022-08-04
Payer: COMMERCIAL

## 2022-08-04 DIAGNOSIS — F91.8 CONDUCT DISORDER, UNDIFFERENTIATED TYPE: Primary | ICD-10-CM

## 2022-08-04 DIAGNOSIS — F43.23 ADJUSTMENT DISORDER WITH MIXED ANXIETY AND DEPRESSED MOOD: ICD-10-CM

## 2022-08-04 PROCEDURE — 99207 PR NO CHARGE LOS: CPT

## 2022-08-04 PROCEDURE — 99213 OFFICE O/P EST LOW 20 MIN: CPT | Mod: GT | Performed by: PHYSICIAN ASSISTANT

## 2022-08-04 PROCEDURE — 90853 GROUP PSYCHOTHERAPY: CPT | Mod: GT

## 2022-08-04 RX ORDER — FLUOXETINE 10 MG/1
CAPSULE ORAL
Qty: 45 CAPSULE | Refills: 0 | Status: SHIPPED | OUTPATIENT
Start: 2022-08-04 | End: 2022-09-07

## 2022-08-04 NOTE — PROGRESS NOTES
[facility]  [facility]  [address], [address]  [place]  [address]    Group Progress Note     8/04/22       Client Name: Ramin Murcia  YOB: 1955  MRN:  [id number]  Treating Provider: Yenifer Bacon, PhD and Mason Adkins, PhD  Type of Session:Group       Video start time: 4:00  Video end time: 6:00    Telemedicine Visit: The patient's condition can be safely assessed and treated via synchronous audio and visual telemedicine encounter.      Reason for Telemedicine Visit: Patient has requested telehealth visit    Originating Site (Patient Location): Patient's home    Distant Site (Provider Location): Provider Remote Setting- Home Office    Consent:  The patient/guardian has verbally consented to: the potential risks and benefits of telemedicine (video visit) versus in person care; bill my insurance or make self-payment for services provided; and responsibility for payment of non-covered services.     Mode of Communication:  Video Conference via Zoom    As the provider I attest to compliance with applicable laws and regulations related to telemedicine.    Current Symptoms/Status:  Patient continues with less with daily urges.  Feels significant improvement since starting Naltrexone.    Now taking 50 mg.  Started using alcohol recently, reports this is 2 glasses of wine per night. Expressed concern that this historically has led to boundary violations. Reported urges, denied boundary violations.     Progress Toward Treatment Goals:   Staying within boundaries, although he reported some urges.       Intervention: Modality and Description:  Interpersonal, relapse prevention, CBT     Response to Intervention:  Shared more of his difficulties with wife sharing more concerns and wondering if he is staing within boundaries.  Realized that he has been overly defensive.  Triggering to his core negative fuels.    He shared feedback with group members, was  receptive to feedback, and maintained attention throughout group.      MENTAL STATUS EXAM  Appearance: appropriate  Attitude: cooperative  Behavior: normal  Eye Contact: normal  Speech: normal  Orientation: oriented to person , place, time and situation  Mood:  admits sadness and anxiety most of time  Affect: Mood Congruent  Thought Process: clear  Suicidal Ideation: none  Hallucination: no     Assignment:  Read Daring Greatly.     Interactive Complexity:  There are four specific communication difficulties that complicate the work of the primary psychiatric procedure.  Interactive complexity (+11468) may be reported when at least one of these difficulties is present.     Communication difficulties present during current the psychiatric procedure include:  1. None.     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.8) Other Specified Disruptive, Impulse Control, and Conduct Disorder (Hypersexual Disorder)    Adjustment Disorder with mixed emotional  features

## 2022-08-04 NOTE — PROGRESS NOTES
Video start time: 605  Video end time: 630    Telemedicine Visit: The patient's condition can be safely assessed and treated via synchronous audio and visual telemedicine encounter.      Reason for Telemedicine Visit: Services only offered telehealth    Originating Site (Patient Location): Patient's home    Distant Site (Provider Location): Provider Remote Setting- Home Office    Consent:  The patient/guardian has verbally consented to: the potential risks and benefits of telemedicine (video visit) versus in person care; bill my insurance or make self-payment for services provided; and responsibility for payment of non-covered services.     Mode of Communication:  Video Conference via [place]    As the provider I attest to compliance with applicable laws and regulations related to telemedicine.    [facility]    Name:  Darlene Murcia   Aliases:  DARLENE MURCIA  :  1955   MRN:  [id number]  Treating Provider: Quan Richards PA-C EdD     Medications at start of visit:  Outpatient Medications Prior to Visit   Medication Sig Dispense Refill     naltrexone (DEPADE/REVIA) 50 MG tablet Take 1 tablet daily 90 tablet 0     tadalafil (CIALIS) 5 MG tablet Take 1 tablet by mouth daily       No facility-administered medications prior to visit.       Allergies:  No Known Allergies    Today's Visit    Current Complaint: Darlene presents for a recheck.  At their last appointment we continued the naltrexone.  He reports that has been somewhat helpful, however believes that there is room for improvement, particularly with obsessive thinking.  Darlene states that there likely is some degree of OCD as well. The patient is not endorsing suicidal/homicidal ideation, active planning, intent or means.  The patient is not endorsing s/s suggestive of hypomania/gera or psychosis.  The patient is endorsing good compliance with and efficacy of their medication regimen without s/e, however he would be interested in augmenting the  naltrexone with an SSRII    Review of Systems: A review of the following systems was negative: Opthalmic, ENT, Cardiovascular, Gastrointestinal, Genitourinary, Musculoskeletal, Integumentary, Neurological, Endocrine, Respiratory, Hematologic, Immunologic      Chemical History: Denies usage of and cravings for alcohol, cannabis, heroin, methamphetamine, cocaine, prescription opioids/benzodiazepines.      Social History: No interval change    Mental Status Exam: The patient's orientation, memory,  Attention, language and fund of knowledge are at their usual best baseline.  Grooming/Hygiene: adequate  Eye Contact: normal  Psychomotor: normal  Observed mood and affect: appropriate  Judgment: intact, with thoughtful decision making and insight  Speech: normal rate, rhythm, tone and volume  Thought processes: normal, with normal rate of thought  Associations:  No deficiency  Abnormal Thoughts: none      Assessment: This is a 66 yo man who carries the diagnosis of conduct disorder unspecified.. The patient's questions were answered to their satisfaction regarding the plan as outlined below. Side effects, risks/benefits/alternatives regarding all psychiatric medications were discussed with the patient in detail.  Dr. Bacon and I had had communication regarding a possible medication change and I am in agreement that a selective serotonin reuptake inhibitor would be a reasonable option.  He will continue to attend individual and group therapy, following up with me in 1 month.          Plan:    Patient Instructions   1)Prozac 10 mg: Take 1 capsule once daily in the morning for 2 weeks, then take 2 capsules daily    2)Naltrexone 50 mg:  We talked about a possible increase in the future, however for now will hold off until the prozac is at a therapeutic  level.     3)See me in 1 month.  Contact the clinic with any questions or concerns.        Total face-to-face time: 30 min    Counseling/Coordination of care greater than  50%.    Counseling/Coordination of care included: Educational counseling regarding psychiatric medications, side effects, interactions.

## 2022-08-04 NOTE — PATIENT INSTRUCTIONS
1)Prozac 10 mg: Take 1 capsule once daily in the morning for 2 weeks, then take 2 capsules daily    2)Naltrexone 50 mg:  We talked about a possible increase in the future, however for now will hold off until the prozac is at a therapeutic  level.     3)See me in 1 month.  Contact the clinic with any questions or concerns.

## 2022-08-16 ENCOUNTER — VIRTUAL VISIT (OUTPATIENT)
Dept: PSYCHOLOGY | Facility: CLINIC | Age: 67
End: 2022-08-16
Payer: COMMERCIAL

## 2022-08-16 DIAGNOSIS — F43.23 ADJUSTMENT DISORDER WITH MIXED ANXIETY AND DEPRESSED MOOD: ICD-10-CM

## 2022-08-16 DIAGNOSIS — F91.8 CONDUCT DISORDER, UNDIFFERENTIATED TYPE: Primary | ICD-10-CM

## 2022-08-16 PROCEDURE — 90853 GROUP PSYCHOTHERAPY: CPT | Mod: GT

## 2022-08-16 NOTE — PROGRESS NOTES
[facility]  [facility]  [address], [address]  [place]  [address]    Group Progress Note     8/16/22       Client Name: Ramin Murcia  YOB: 1955  MRN:  [id number]  Treating Provider: Yenifer Bacon, PhD and Mason Adkins, PhD  Type of Session:Group       Video start time: 4:00  Video end time: 6:00    Telemedicine Visit: The patient's condition can be safely assessed and treated via synchronous audio and visual telemedicine encounter.      Reason for Telemedicine Visit: Patient has requested telehealth visit    Originating Site (Patient Location): Patient's home    Distant Site (Provider Location): Provider Remote Setting- Home Office    Consent:  The patient/guardian has verbally consented to: the potential risks and benefits of telemedicine (video visit) versus in person care; bill my insurance or make self-payment for services provided; and responsibility for payment of non-covered services.     Mode of Communication:  Video Conference via Zoom    As the provider I attest to compliance with applicable laws and regulations related to telemedicine.    Current Symptoms/Status:  Patient continues with less with daily urges.  Feels significant improvement since starting Naltrexone.    Now taking 50 mg.  Started using alcohol recently, reports this is 2 glasses of wine per night. Expressed concern that this historically has led to boundary violations. Reported urges, denied boundary violations.     Progress Toward Treatment Goals:   Staying within boundaries, although he reported some urges.       Intervention: Modality and Description:  Interpersonal, relapse prevention, CBT     Response to Intervention:  Still struggling with feeling lack of trust from wife.  Helped him see his part in that.    He shared feedback with group members, was receptive to feedback, and maintained attention throughout group.      MENTAL STATUS EXAM  Appearance:  appropriate  Attitude: cooperative  Behavior: normal  Eye Contact: normal  Speech: normal  Orientation: oriented to person , place, time and situation  Mood:  admits sadness and anxiety most of time  Affect: Mood Congruent  Thought Process: clear  Suicidal Ideation: none  Hallucination: no     Assignment:  Read Daring Greatly.     Interactive Complexity:  There are four specific communication difficulties that complicate the work of the primary psychiatric procedure.  Interactive complexity (+16844) may be reported when at least one of these difficulties is present.     Communication difficulties present during current the psychiatric procedure include:  1. None.     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.8) Other Specified Disruptive, Impulse Control, and Conduct Disorder (Hypersexual Disorder)    Adjustment Disorder with mixed emotional  features

## 2022-08-22 ENCOUNTER — MYC REFILL (OUTPATIENT)
Dept: PSYCHIATRY | Facility: CLINIC | Age: 67
End: 2022-08-22

## 2022-08-22 DIAGNOSIS — F91.8 CONDUCT DISORDER, UNDIFFERENTIATED TYPE: ICD-10-CM

## 2022-08-22 RX ORDER — NALTREXONE HYDROCHLORIDE 50 MG/1
TABLET, FILM COATED ORAL
Qty: 90 TABLET | Refills: 0 | Status: CANCELLED | OUTPATIENT
Start: 2022-08-22

## 2022-08-22 NOTE — TELEPHONE ENCOUNTER
Refill request: Naltrexone 50mg  Denied  New Rx sent 8/3/2022 - [facility]  #[id number]      Dior Mike,[facility]

## 2022-08-25 ENCOUNTER — VIRTUAL VISIT (OUTPATIENT)
Dept: PSYCHOLOGY | Facility: CLINIC | Age: 67
End: 2022-08-25
Payer: COMMERCIAL

## 2022-08-25 DIAGNOSIS — F91.8 CONDUCT DISORDER, UNDIFFERENTIATED TYPE: Primary | ICD-10-CM

## 2022-08-25 DIAGNOSIS — F43.23 ADJUSTMENT DISORDER WITH MIXED ANXIETY AND DEPRESSED MOOD: ICD-10-CM

## 2022-08-25 PROCEDURE — 90853 GROUP PSYCHOTHERAPY: CPT | Mod: GT

## 2022-09-01 NOTE — PROGRESS NOTES
[facility]  [facility]  [address], [address]  [place]  [address]    Group Progress Note     8/25/22       Client Name: Ramin Murcia  YOB: 1955  MRN:  [id number]  Treating Provider: Yenifer Bacon, PhD and Mason Adkins, PhD  Type of Session:Group       Video start time: 4:00  Video end time: 6:00    Telemedicine Visit: The patient's condition can be safely assessed and treated via synchronous audio and visual telemedicine encounter.      Reason for Telemedicine Visit: Patient has requested telehealth visit    Originating Site (Patient Location): Patient's home    Distant Site (Provider Location): Provider Remote Setting- Home Office    Consent:  The patient/guardian has verbally consented to: the potential risks and benefits of telemedicine (video visit) versus in person care; bill my insurance or make self-payment for services provided; and responsibility for payment of non-covered services.     Mode of Communication:  Video Conference via Zoom    As the provider I attest to compliance with applicable laws and regulations related to telemedicine.    Current Symptoms/Status:  Patient continues with less with daily urges.  Feels significant improvement since starting Naltrexone.    Now taking 50 mg.  Started Prozac 20 Mg  Started using alcohol recently, less than2 glasses of wine per night. Expressed concern that this historically has led to boundary violations. Reported urges, denied boundary violations.     Progress Toward Treatment Goals:   Staying within boundaries, although he reported some urges.       Intervention: Modality and Description:  Interpersonal, relapse prevention, CBT     Response to Intervention:  Shared more of his difficulties with wife sharing more concerns and wondering if he is staying within boundaries.  Processed this more and talked about better communication skills when she shares her concern.    He shared feedback  with group members, was receptive to feedback, and maintained attention throughout group.      MENTAL STATUS EXAM  Appearance: appropriate  Attitude: cooperative  Behavior: normal  Eye Contact: normal  Speech: normal  Orientation: oriented to person , place, time and situation  Mood:  admits sadness and anxiety most of time  Affect: Mood Congruent  Thought Process: clear  Suicidal Ideation: none  Hallucination: no     Assignment:  Read Daring Greatly.     Interactive Complexity:  There are four specific communication difficulties that complicate the work of the primary psychiatric procedure.  Interactive complexity (+25107) may be reported when at least one of these difficulties is present.     Communication difficulties present during current the psychiatric procedure include:  1. None.     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.8) Other Specified Disruptive, Impulse Control, and Conduct Disorder (Hypersexual Disorder)    Adjustment Disorder with mixed emotional  features

## 2022-09-06 DIAGNOSIS — F91.8 CONDUCT DISORDER, UNDIFFERENTIATED TYPE: ICD-10-CM

## 2022-09-06 NOTE — TELEPHONE ENCOUNTER
Requested Medication: FLUoxetine 10mg  Dose: 20mg  Quantity: 45  Refills: 0    Take 2 (10mg) tablets daily    Last seen at [facility]: 8/4 - 1 mo  Next Appointment with Provider: 9/8    Dior Mike CMA

## 2022-09-07 RX ORDER — FLUOXETINE 10 MG/1
CAPSULE ORAL
Qty: 45 CAPSULE | Refills: 0 | Status: SHIPPED | OUTPATIENT
Start: 2022-09-07 | End: 2022-09-27

## 2022-09-08 ENCOUNTER — VIRTUAL VISIT (OUTPATIENT)
Dept: PSYCHIATRY | Facility: CLINIC | Age: 67
End: 2022-09-08
Payer: COMMERCIAL

## 2022-09-08 ENCOUNTER — VIRTUAL VISIT (OUTPATIENT)
Dept: PSYCHOLOGY | Facility: CLINIC | Age: 67
End: 2022-09-08
Payer: COMMERCIAL

## 2022-09-08 DIAGNOSIS — F91.8 CONDUCT DISORDER, UNDIFFERENTIATED TYPE: Primary | ICD-10-CM

## 2022-09-08 DIAGNOSIS — F43.23 ADJUSTMENT DISORDER WITH MIXED ANXIETY AND DEPRESSED MOOD: ICD-10-CM

## 2022-09-08 PROCEDURE — 90853 GROUP PSYCHOTHERAPY: CPT | Mod: GT

## 2022-09-08 PROCEDURE — 99214 OFFICE O/P EST MOD 30 MIN: CPT | Mod: GT | Performed by: PHYSICIAN ASSISTANT

## 2022-09-08 NOTE — PROGRESS NOTES
[facility]  [facility]  [address], [address]  [place]  [address]    Group Progress Note     9/08/22       Client Name: Ramin Murcia  YOB: 1955  MRN:  [id number]  Treating Provider: Yenifer Bacon, PhD and Mason Adkins, PhD  Type of Session:Group       Video start time: 4:00  Video end time: 6:00    Telemedicine Visit: The patient's condition can be safely assessed and treated via synchronous audio and visual telemedicine encounter.      Reason for Telemedicine Visit: Patient has requested telehealth visit    Originating Site (Patient Location): Patient's home    Distant Site (Provider Location): clinic    Consent:  The patient/guardian has verbally consented to: the potential risks and benefits of telemedicine (video visit) versus in person care; bill my insurance or make self-payment for services provided; and responsibility for payment of non-covered services.     Mode of Communication:  Video Conference via Zoom    As the provider I attest to compliance with applicable laws and regulations related to telemedicine.    Current Symptoms/Status:  Patient continues with less with daily urges.  Feels significant improvement since starting Naltrexone.    Now taking 50 mg.  Started Prozac 20 Mg  Started using alcohol recently, less than2 glasses of wine per night. Expressed concern that this historically has led to boundary violations.  Reported urges, denied boundary violations.     Progress Toward Treatment Goals:   Staying within boundaries, although he reported some urges.       Intervention: Modality and Description:  Interpersonal, relapse prevention, CBT     Response to Intervention:  Processed wedding and an urge to violate boundaries.  Was challenged to think how he plays a role in puttng himself in risk situatio.  He shared feedback with group members, was receptive to feedback, and maintained attention throughout group.      MENTAL  STATUS EXAM  Appearance: appropriate  Attitude: cooperative  Behavior: normal  Eye Contact: normal  Speech: normal  Orientation: oriented to person , place, time and situation  Mood:  admits sadness and anxiety most of time  Affect: Mood Congruent  Thought Process: clear  Suicidal Ideation: none  Hallucination: no     Assignment:  Read Daring Greatly.     Interactive Complexity:  There are four specific communication difficulties that complicate the work of the primary psychiatric procedure.  Interactive complexity (+94260) may be reported when at least one of these difficulties is present.     Communication difficulties present during current the psychiatric procedure include:  1. None.     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.8) Other Specified Disruptive, Impulse Control, and Conduct Disorder (Hypersexual Disorder)    Adjustment Disorder with mixed emotional  features

## 2022-09-09 NOTE — PROGRESS NOTES
Video start time: 735  Video end time: 750    Telemedicine Visit: The patient's condition can be safely assessed and treated via synchronous audio and visual telemedicine encounter.      Reason for Telemedicine Visit: Services only offered telehealth    Originating Site (Patient Location): Patient's home    Distant Site (Provider Location): Provider Remote Setting- Home Office    Consent:  The patient/guardian has verbally consented to: the potential risks and benefits of telemedicine (video visit) versus in person care; bill my insurance or make self-payment for services provided; and responsibility for payment of non-covered services.     Mode of Communication:  Video Conference via Rezee    As the provider I attest to compliance with applicable laws and regulations related to telemedicine.    [facility]    Name:  Darlene Murcia   Aliases:  DARLENE MURCIA  :  1955   MRN:  [id number]  Treating Provider: Quan Richards PA-C EdD     Medications at start of visit:  Outpatient Medications Prior to Visit   Medication Sig Dispense Refill     FLUoxetine (PROZAC) 10 MG capsule Take 1 cap daily for 2 weeks, then take 2 caps daily 45 capsule 0     naltrexone (DEPADE/REVIA) 50 MG tablet Take 1 tablet daily 90 tablet 0     tadalafil (CIALIS) 5 MG tablet Take 1 tablet by mouth daily       No facility-administered medications prior to visit.       Allergies:  No Known Allergies    Today's Visit    Current Complaint: Darlene presents for a recheck.  At their last appointment we added fluoxetine, titrating to 20 mg.  He reports that he has less urge to act out and mood is stable.  He is describing some intermittent anorgasmia and irritability which both increased somewhat with the dosage increase. Overall, he is doing well and would like to remain on his current regimen.  The patient is not endorsing suicidal/homicidal ideation, active planning, intent or means.  The patient is not endorsing s/s suggestive of  hypomania/gera or psychosis.  The patient is endorsing good compliance with and efficacy of their medication.     Review of Systems: A review of the following systems was negative: Opthalmic, ENT, Cardiovascular, Gastrointestinal, Genitourinary, Musculoskeletal, Integumentary, Neurological, Endocrine, Respiratory, Hematologic, Immunologic      Chemical History: Denies usage of and cravings for alcohol, cannabis, heroin, methamphetamine, cocaine, prescription opioids/benzodiazepines.      Social History: No interval change    Mental Status Exam: The patient's orientation, memory,  Attention, language and fund of knowledge are at their usual best baseline.  Grooming/Hygiene: adequate  Eye Contact: normal  Psychomotor: normal  Observed mood and affect: appropriate  Judgment: intact, with thoughtful decision making and insight  Speech: normal rate, rhythm, tone and volume  Thought processes: normal, with normal rate of thought  Associations:  No deficiency  Abnormal Thoughts: none      Assessment: This is a 66 yo man who carries the diagnosis of conduct disorder nos. The patient's questions were answered to their satisfaction regarding the plan as outlined below. Side effects, risks/benefits/alternatives regarding all psychiatric medications were discussed with the patient in detail.          Plan:    Patient Instructions   1)Prozac: Continue taking 20 mg/day.     2)Naltrexone:  Continue taking 50 mg/day.    3)We discussed the symptoms you have been experiencing.  Regarding the anorgasmia, there are medication options to pursue if the symptoms persist.     4)See me in 3 months.  Contact the clinic with any questions or concerns.         Total face-to-face time: 30 min    Counseling/Coordination of care greater than 50%.    Counseling/Coordination of care included: Educational counseling regarding psychiatric medications, side effects, interactions.

## 2022-09-09 NOTE — PATIENT INSTRUCTIONS
1)Prozac: Continue taking 20 mg/day.     2)Naltrexone:  Continue taking 50 mg/day.    3)We discussed the symptoms you have been experiencing.  Regarding the anorgasmia, there are medication options to pursue if the symptoms persist.     4)See me in 3 months.  Contact the clinic with any questions or concerns.

## 2022-09-12 ENCOUNTER — OFFICE VISIT (OUTPATIENT)
Dept: PSYCHOLOGY | Facility: CLINIC | Age: 67
End: 2022-09-12
Payer: COMMERCIAL

## 2022-09-12 DIAGNOSIS — F91.8 CONDUCT DISORDER, UNDIFFERENTIATED TYPE: ICD-10-CM

## 2022-09-12 DIAGNOSIS — F41.9 ANXIETY DISORDER, UNSPECIFIED TYPE: Primary | ICD-10-CM

## 2022-09-12 PROCEDURE — 90837 PSYTX W PT 60 MINUTES: CPT | Performed by: PSYCHOLOGIST

## 2022-09-12 NOTE — PROGRESS NOTES
[facility] -  Case Progress Note     9/12/22       Client Name: Ramin Murcia  YOB: 1955  MRN:  [id number]  Treating Provider: Yenifer Bacon, PhD LP  Type of Session:  Individual  Present in Session: Patient    Time: 55 min.     In person     Current Symptoms/Status:  Patient continues with less with daily urges.  Feels significant improvement since starting Naltrexone.    Now taking 50 mg Prozac as well.  Feels that is improving symptoms as well.  Reduced urges.  Feels less antsy.     Progress Toward Treatment Goals:   Feeling much better.  Gaining further understanding the source of his hypersexuality.  Less urges. Staying within boundaries.     Intervention: Modality and Description:  Interpersonal, relapse presention, CBT     Response to Intervention:  Concerns about drinking addressed. Staying within boundaries but his not being completely honest.  Needs to work on that.    Had some strong urges - and was able to interrupt.  But having to come to terms that he is probably diong something to receive the advances that he has.    Needs to start working on genogram, antecedents, family history.          How much should he go into issues with father?  Next youngest brother - was also abused.  Guilt about not protecting him.  Tried to talk to uncle but he was unresponsive.     MENTAL STATUS EXAM  Appearance: appropriate  Attitude: cooperative  Behavior: normal  Eyre Contact: normal  Speech: normal  Orientation: oreinted to person , place, time and situation  Mood:  admits sadness and anxiety most of time  Affect: Mood Congruient  Thought Process: clear  Suicidal Ideation: none  Hallucination: no     Assignment:  Read Daring Greatly.     Interactive Complexity:  There are four specific communication difficulties that complicate the work of the primary psychiatric procedure.  Interactive complexity (+27173) may be reported when at least one of these difficulties is  present.     Communication difficulties present during current the psychiatric procedure include:  1. None.     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.8) Other Specified Disruptive, Impulse Control, and Conduct Disorder (Hypersexual Disorder)    Adjustment Disorder with mixed emotional  features     TREATMENT PLAN  Date of Treatment Plan 22  Name: Ramin Murcia  : 1955  Medical Record Number: [id number]  Treating Provider: Yenifer Bacon, PhD        Current Status:  Depression/Mood:  Sad  Increased Crying  Decreased Interest  Decreased Self-Esteem  Guilt  Hopelessness/Helplessness  Dysphoria     Anxiety/Panic:  Worry  Intrusive Thoughts     Thought:   Reports no problems or symptoms at this time     Sensorium:  Reports no problems or symptoms at this time     Behavior/Health:  Compulsive  Relationship Problems     Chemical Use:  Denies both Alcohol and Drug Abuse     Sexual Problems:  Compulsive sexual behavior     Suicide Risk Assessment:  Assessed Level of Immediate Risk:  YES  Ideation:  NO  Plan:  NO  Means:  NO  Intent:  YES     Homicide Risk Assessment:  Assessed Level of Immediate Risk:  NO  Ideation:  NO  Plan:  NO  Means:  NO  Intent:  NO     Impact of Symptoms on Function:  Decreased Social/Family Function     DSM-5 Diagnoses:         Screening Questionnaires:  Please indicate whether the following screening questionnaires have been completed:  CAGE: Yes  PHQ-9: Yes    YONY-7: Yes  WHODAS: No     Problem(s):  1. Compulsive sexual behavior  2. Depresssion  3. Existential crisis     Short-Long Term Goals  Decrease Symptoms  Increase Coping  Change Behavior  Change Cognitions  Increase Psychosocial/Support Functioning  Monitor Psych Status  Improve Communication  Enhance Relationships     Interventions  [facility]  Cognitive-Behavioral Therapy  Medication Management Referral  Bibliotherapy     Expected Outcomes and Prognosis  Expected improvement     Anticipated Treatment Duration:  Unknown     Frequency  of Sessions  2 x / Month     Progress Update (for Plan Update Only)  NA:  1st Treatment Planning     Current Psychoactive Medications:  See Psychiatric Treatment Plan  Discharge & Aftercare Goals: To Be Determined.  Care Coordination: Yes     Consent to Treatment:   Patient participated in this treatment planning process and indicated verbal agreement with the above treatment plan.  If patient doesn't sign, indicate why: Telehealth visit due to COVID19 pandemic     Patient Signature: Ramin Murcia   Date: 5-9-22        Provider Signature: Yenifer Bacon, PhD,   Date: 5-9-22

## 2022-09-15 ENCOUNTER — VIRTUAL VISIT (OUTPATIENT)
Dept: PSYCHOLOGY | Facility: CLINIC | Age: 67
End: 2022-09-15
Payer: COMMERCIAL

## 2022-09-15 DIAGNOSIS — F41.9 ANXIETY DISORDER, UNSPECIFIED TYPE: ICD-10-CM

## 2022-09-15 DIAGNOSIS — F91.8 CONDUCT DISORDER, UNDIFFERENTIATED TYPE: Primary | ICD-10-CM

## 2022-09-15 PROCEDURE — 90853 GROUP PSYCHOTHERAPY: CPT | Mod: GT

## 2022-09-15 NOTE — PROGRESS NOTES
[facility]  [facility]  [address], [address]  [place]  [address]    Group Progress Note     9/15/22       Client Name: Ramin Murcia  YOB: 1955  MRN:  [id number]  Treating Provider: Yenifer Bacon, PhD and Mason Adkins, PhD  Type of Session:Group       Video start time: 4:00  Video end time: 6:00    Telemedicine Visit: The patient's condition can be safely assessed and treated via synchronous audio and visual telemedicine encounter.      Reason for Telemedicine Visit: Patient has requested telehealth visit    Originating Site (Patient Location): Patient's home    Distant Site (Provider Location): clinic    Consent:  The patient/guardian has verbally consented to: the potential risks and benefits of telemedicine (video visit) versus in person care; bill my insurance or make self-payment for services provided; and responsibility for payment of non-covered services.     Mode of Communication:  Video Conference via Zoom    As the provider I attest to compliance with applicable laws and regulations related to telemedicine.    Current Symptoms/Status:  Patient continues with less with daily urges.  Feels significant improvement since starting Naltrexone.    Now taking 50 mg.  Started Prozac 20 Mg  Started using alcohol recently, less than2 glasses of wine per night. Expressed concern that this historically has led to boundary violations.  Reported urges, denied boundary violations.     Progress Toward Treatment Goals:   Staying within boundaries, although he reported some urges.       Intervention: Modality and Description:  Interpersonal, relapse prevention, CBT     Response to Intervention:  Processed his existential anxiety and how it related to his CSB.  Received very good feedback - felt like a light bulb went off.        MENTAL STATUS EXAM  Appearance: appropriate  Attitude: cooperative  Behavior: normal  Eye Contact: normal  Speech:  normal  Orientation: oriented to person , place, time and situation  Mood:  admits sadness and anxiety most of time  Affect: Mood Congruent  Thought Process: clear  Suicidal Ideation: none  Hallucination: no     Assignment:  Read Daring Greatly.     Interactive Complexity:  There are four specific communication difficulties that complicate the work of the primary psychiatric procedure.  Interactive complexity (+76415) may be reported when at least one of these difficulties is present.     Communication difficulties present during current the psychiatric procedure include:  1. None.     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.8) Other Specified Disruptive, Impulse Control, and Conduct Disorder (Hypersexual Disorder)    Adjustment Disorder with mixed emotional  features

## 2022-09-26 ENCOUNTER — OFFICE VISIT (OUTPATIENT)
Dept: PSYCHOLOGY | Facility: CLINIC | Age: 67
End: 2022-09-26
Payer: COMMERCIAL

## 2022-09-26 DIAGNOSIS — F41.9 ANXIETY DISORDER, UNSPECIFIED TYPE: ICD-10-CM

## 2022-09-26 DIAGNOSIS — F91.8 CONDUCT DISORDER, UNDIFFERENTIATED TYPE: Primary | ICD-10-CM

## 2022-09-26 PROCEDURE — 90837 PSYTX W PT 60 MINUTES: CPT | Performed by: PSYCHOLOGIST

## 2022-09-27 DIAGNOSIS — F91.8 CONDUCT DISORDER, UNDIFFERENTIATED TYPE: ICD-10-CM

## 2022-09-27 NOTE — TELEPHONE ENCOUNTER
Requested Medication: Fluoxetine 10mg  Dose: 20mg  Quantity: 60  Refills: 0    Take 2 (10mg) capsules daily    Last seen at [facility]: 9/8 - 3 mo  Next Appointment with Provider:  Visit date not found      Dior Mike CMA

## 2022-09-28 RX ORDER — FLUOXETINE 10 MG/1
CAPSULE ORAL
Qty: 60 CAPSULE | Refills: 0 | Status: SHIPPED | OUTPATIENT
Start: 2022-09-28 | End: 2022-11-02

## 2022-09-29 ENCOUNTER — VIRTUAL VISIT (OUTPATIENT)
Dept: PSYCHOLOGY | Facility: CLINIC | Age: 67
End: 2022-09-29
Payer: COMMERCIAL

## 2022-09-29 DIAGNOSIS — F91.8 CONDUCT DISORDER, UNDIFFERENTIATED TYPE: Primary | ICD-10-CM

## 2022-09-29 DIAGNOSIS — F43.23 ADJUSTMENT DISORDER WITH MIXED ANXIETY AND DEPRESSED MOOD: ICD-10-CM

## 2022-09-29 PROCEDURE — 90853 GROUP PSYCHOTHERAPY: CPT | Mod: GT

## 2022-09-29 NOTE — PROGRESS NOTES
[facility]  [facility]  [address], [address]  [place]  [address]    Group Progress Note     9/29/22       Client Name: Ramin Murcia  YOB: 1955  MRN:  [id number]  Treating Provider: Mason Adkins, PhD  Type of Session: Group     Video start time: 4:00  Video end time: 6:00    Telemedicine Visit: The patient's condition can be safely assessed and treated via synchronous audio and visual telemedicine encounter.      Reason for Telemedicine Visit: Patient has requested telehealth visit    Originating Site (Patient Location): Patient's home    Distant Site (Provider Location): clinic    Consent:  The patient/guardian has verbally consented to: the potential risks and benefits of telemedicine (video visit) versus in person care; bill my insurance or make self-payment for services provided; and responsibility for payment of non-covered services.     Mode of Communication:  Video Conference via Zoom    As the provider I attest to compliance with applicable laws and regulations related to telemedicine.    Current Symptoms/Status:  Patient continues with less with daily urges.  Feels significant improvement since starting Naltrexone.    Now taking 50 mg.  Started Prozac 20 Mg  Started using alcohol recently, less than2 glasses of wine per night. Expressed concern that this historically has led to boundary violations.  Reported urges, denied boundary violations.     Progress Toward Treatment Goals:   Staying within boundaries, although he reported some urges. Continues to work on improving intimacy with his wife.     Intervention: Modality and Description:  Utilized supportive and CBT techniques to address CSB and related mental health issues.        Response to Intervention:  Client shared an initial draft of his antecedents/history. He highlighted his relationship and sexual history, as well as family of origin issues. Client provided feedback to  others and was receptive to feedback from group members.      MENTAL STATUS EXAM  Appearance: appropriate  Attitude: cooperative  Behavior: normal  Eye Contact: normal  Speech: normal  Orientation: oriented to person , place, time and situation  Mood:  admits sadness and anxiety most of time  Affect: Mood Congruent  Thought Process: clear  Suicidal Ideation: none  Hallucination: no     Assignment:  Read Daring Greatly.     Interactive Complexity:  There are four specific communication difficulties that complicate the work of the primary psychiatric procedure.  Interactive complexity (+95639) may be reported when at least one of these difficulties is present.     Communication difficulties present during current the psychiatric procedure include:  1. None.     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.8) Other Specified Disruptive, Impulse Control, and Conduct Disorder (Hypersexual Disorder)    Adjustment Disorder with mixed emotional  features

## 2022-10-03 ENCOUNTER — HEALTH MAINTENANCE LETTER (OUTPATIENT)
Age: 67
End: 2022-10-03

## 2022-10-05 NOTE — PROCEDURES
I did not personally see the patient. I reviewed and agree with the assessment and plan of this note.       Anusha Hernandez, PhD, LP    [facility]  Department of Family Medicine and Community Health  [facility]

## 2022-10-06 ENCOUNTER — VIRTUAL VISIT (OUTPATIENT)
Dept: PSYCHOLOGY | Facility: CLINIC | Age: 67
End: 2022-10-06
Payer: COMMERCIAL

## 2022-10-06 DIAGNOSIS — F43.23 ADJUSTMENT DISORDER WITH MIXED ANXIETY AND DEPRESSED MOOD: ICD-10-CM

## 2022-10-06 DIAGNOSIS — F91.8 CONDUCT DISORDER, UNDIFFERENTIATED TYPE: Primary | ICD-10-CM

## 2022-10-06 PROCEDURE — 90853 GROUP PSYCHOTHERAPY: CPT | Mod: GT

## 2022-10-06 NOTE — PROGRESS NOTES
[facility]  [facility]  [address], [address]  [place]  [address]    Group Progress Note     10/06/22       Client Name: Ramin Murcia  YOB: 1955  MRN:  [id number]  Treating Provider: Mason Adkins, PhD and Yenifer Bacon, PhD  Type of Session: Group     Video start time: 4:00  Video end time: 6:00    Telemedicine Visit: The patient's condition can be safely assessed and treated via synchronous audio and visual telemedicine encounter.      Reason for Telemedicine Visit: Patient has requested telehealth visit    Originating Site (Patient Location): Patient's home    Distant Site (Provider Location): clinic    Consent:  The patient/guardian has verbally consented to: the potential risks and benefits of telemedicine (video visit) versus in person care; bill my insurance or make self-payment for services provided; and responsibility for payment of non-covered services.     Mode of Communication:  Video Conference via Zoom    As the provider I attest to compliance with applicable laws and regulations related to telemedicine.    Current Symptoms/Status:  Patient continues with less with daily urges.  Feels significant improvement since starting Naltrexone.    Now taking 50 mg.  Started Prozac 20 Mg  Started using alcohol recently, less than 2 glasses of wine per night. Expressed concern that this historically has led to boundary violations.  Reported urges, denied boundary violations.     Progress Toward Treatment Goals:   Staying within boundaries, although he reported some urges. Continues to work on improving intimacy with his wife.     Intervention: Modality and Description:  Utilized supportive and CBT techniques to address CSB and related mental health issues.        Response to Intervention:  Client shared the second part of his antecedents/history. Many of the things he had reviewed previously.  Frustrated that his wife is being more  suspicious lately.  Client provided feedback to others and was receptive to feedback from group members.      MENTAL STATUS EXAM  Appearance: appropriate  Attitude: cooperative  Behavior: normal  Eye Contact: normal  Speech: normal  Orientation: oriented to person , place, time and situation  Mood:  admits sadness and anxiety most of time  Affect: Mood Congruent  Thought Process: clear  Suicidal Ideation: none  Hallucination: no     Assignment:  Read Daring Greatly.     Interactive Complexity:  There are four specific communication difficulties that complicate the work of the primary psychiatric procedure.  Interactive complexity (+25243) may be reported when at least one of these difficulties is present.     Communication difficulties present during current the psychiatric procedure include:  1. None.     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.8) Other Specified Disruptive, Impulse Control, and Conduct Disorder (Hypersexual Disorder)    Adjustment Disorder with mixed emotional  features    Mason Tena, PhD     I did not personally see the patient. I reviewed and agree with the assessment and plan of this note.       Anusha Hernandez, PhD, LP    [facility]  Department of Family Medicine and Community Health  [facility]

## 2022-10-10 ENCOUNTER — OFFICE VISIT (OUTPATIENT)
Dept: PSYCHOLOGY | Facility: CLINIC | Age: 67
End: 2022-10-10
Payer: COMMERCIAL

## 2022-10-10 DIAGNOSIS — F41.9 ANXIETY DISORDER, UNSPECIFIED TYPE: ICD-10-CM

## 2022-10-10 DIAGNOSIS — F91.8 CONDUCT DISORDER, UNDIFFERENTIATED TYPE: Primary | ICD-10-CM

## 2022-10-10 PROCEDURE — 90837 PSYTX W PT 60 MINUTES: CPT | Performed by: PSYCHOLOGIST

## 2022-10-10 NOTE — PROGRESS NOTES
[facility] -  Case Progress Note     9/26/22       Client Name: Ramin Murcia  YOB: 1955  MRN:  [id number]  Treating Provider: Yenifer Bacon, PhD LP  Type of Session:  Individual  Present in Session: Patient    Time: 55 min.     In person     Current Symptoms/Status:  Patient continues with less with daily urges.  Feels significant improvement since starting Naltrexone.    Now taking 50 mg Prozac as well.  Feels that is improving symptoms as well.  Reduced urges.  Feels less antsy.     Progress Toward Treatment Goals:   Feeling much better.  Gaining further understanding the source of his hypersexuality.  Less urges. Staying within boundaries.     Intervention: Modality and Description:  Interpersonal, relapse presention, CBT     Response to Intervention:  Still has concerns about drinking. Has had some boundary violations.  Reinforced boundaries.       Talked about his existential anxiety.  Read man's search for meaning.  Hit home to him.  Searching for meaning and purpose.    Reviewed antecedents and gave him feedback on that.    Needs to start working on genogram, family history.          How much should he go into issues with father?  Next youngest brother - was also abused.  Guilt about not protecting him.  Tried to talk to uncle but he was unresponsive.     MENTAL STATUS EXAM  Appearance: appropriate  Attitude: cooperative  Behavior: normal  Eyre Contact: normal  Speech: normal  Orientation: oreinted to person , place, time and situation  Mood:  admits sadness and anxiety most of time  Affect: Mood Congruient  Thought Process: clear  Suicidal Ideation: none  Hallucination: no     Assignment:  Read Daring Greatly.     Interactive Complexity:  There are four specific communication difficulties that complicate the work of the primary psychiatric procedure.  Interactive complexity (+74321) may be reported when at least one of these difficulties is present.     Communication  difficulties present during current the psychiatric procedure include:  1. None.     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.8) Other Specified Disruptive, Impulse Control, and Conduct Disorder (Hypersexual Disorder)    Adjustment Disorder with mixed emotional  features     TREATMENT PLAN  Date of Treatment Plan 22  Name: Ramin Murcia  : 1955  Medical Record Number: [id number]  Treating Provider: Yenifer Bacon, PhD        Current Status:  Depression/Mood:  Sad  Increased Crying  Decreased Interest  Decreased Self-Esteem  Guilt  Hopelessness/Helplessness  Dysphoria     Anxiety/Panic:  Worry  Intrusive Thoughts     Thought:   Reports no problems or symptoms at this time     Sensorium:  Reports no problems or symptoms at this time     Behavior/Health:  Compulsive  Relationship Problems     Chemical Use:  Denies both Alcohol and Drug Abuse     Sexual Problems:  Compulsive sexual behavior     Suicide Risk Assessment:  Assessed Level of Immediate Risk:  YES  Ideation:  NO  Plan:  NO  Means:  NO  Intent:  YES     Homicide Risk Assessment:  Assessed Level of Immediate Risk:  NO  Ideation:  NO  Plan:  NO  Means:  NO  Intent:  NO     Impact of Symptoms on Function:  Decreased Social/Family Function     DSM-5 Diagnoses:         Screening Questionnaires:  Please indicate whether the following screening questionnaires have been completed:  CAGE: Yes  PHQ-9: Yes    YONY-7: Yes  WHODAS: No     Problem(s):  1. Compulsive sexual behavior  2. Depresssion  3. Existential crisis     Short-Long Term Goals  Decrease Symptoms  Increase Coping  Change Behavior  Change Cognitions  Increase Psychosocial/Support Functioning  Monitor Psych Status  Improve Communication  Enhance Relationships     Interventions  [place] [facility]  Cognitive-Behavioral Therapy  Medication Management Referral  Bibliotherapy     Expected Outcomes and Prognosis  Expected improvement     Anticipated Treatment Duration:  Unknown     Frequency of Sessions  2 x /  Month     Progress Update (for Plan Update Only)  NA:  1st Treatment Planning     Current Psychoactive Medications:  See Psychiatric Treatment Plan  Discharge & Aftercare Goals: To Be Determined.  Care Coordination: Yes     Consent to Treatment:   Patient participated in this treatment planning process and indicated verbal agreement with the above treatment plan.  If patient doesn't sign, indicate why: Telehealth visit due to COVID19 pandemic     Patient Signature: Ramin Murcia   Date: 5-9-22        Provider Signature: Yenifer Bacon, PhD,   Date: 5-9-22

## 2022-10-13 ENCOUNTER — VIRTUAL VISIT (OUTPATIENT)
Dept: PSYCHOLOGY | Facility: CLINIC | Age: 67
End: 2022-10-13
Payer: COMMERCIAL

## 2022-10-13 DIAGNOSIS — F91.8 CONDUCT DISORDER, UNDIFFERENTIATED TYPE: Primary | ICD-10-CM

## 2022-10-13 DIAGNOSIS — F43.23 ADJUSTMENT DISORDER WITH MIXED ANXIETY AND DEPRESSED MOOD: ICD-10-CM

## 2022-10-13 PROCEDURE — 90853 GROUP PSYCHOTHERAPY: CPT | Mod: GT

## 2022-10-17 NOTE — PROCEDURES
I did not personally see the patient. I reviewed and agree with the assessment and plan of this note.       Anusha Hernandez, PhD, LP    [facility]  Department of Family Medicine and Community Health  [facility]

## 2022-10-20 ENCOUNTER — VIRTUAL VISIT (OUTPATIENT)
Dept: PSYCHOLOGY | Facility: CLINIC | Age: 67
End: 2022-10-20
Payer: COMMERCIAL

## 2022-10-20 DIAGNOSIS — F91.8 CONDUCT DISORDER, UNDIFFERENTIATED TYPE: Primary | ICD-10-CM

## 2022-10-20 DIAGNOSIS — F41.9 ANXIETY DISORDER, UNSPECIFIED TYPE: ICD-10-CM

## 2022-10-20 PROCEDURE — 90853 GROUP PSYCHOTHERAPY: CPT | Mod: GT

## 2022-10-27 ENCOUNTER — VIRTUAL VISIT (OUTPATIENT)
Dept: PSYCHOLOGY | Facility: CLINIC | Age: 67
End: 2022-10-27
Payer: COMMERCIAL

## 2022-10-27 DIAGNOSIS — F43.23 ADJUSTMENT DISORDER WITH MIXED ANXIETY AND DEPRESSED MOOD: ICD-10-CM

## 2022-10-27 DIAGNOSIS — F91.8 CONDUCT DISORDER, UNDIFFERENTIATED TYPE: Primary | ICD-10-CM

## 2022-10-27 PROCEDURE — 90853 GROUP PSYCHOTHERAPY: CPT | Mod: GT

## 2022-10-28 ENCOUNTER — MYC MEDICAL ADVICE (OUTPATIENT)
Dept: PSYCHIATRY | Facility: CLINIC | Age: 67
End: 2022-10-28

## 2022-10-28 DIAGNOSIS — F91.8 CONDUCT DISORDER, UNDIFFERENTIATED TYPE: ICD-10-CM

## 2022-10-30 NOTE — PROGRESS NOTES
[facility]  [facility]  [address], [address]  [place]  [address]    Group Progress Note     10/13/22       Client Name: Ramin Murcia  YOB: 1955  MRN:  [id number]  Treating Provider: Mason Adkins, PhD and Yenifer Bacon, PhD  Type of Session: Group     Video start time: 4:00  Video end time: 6:00    Telemedicine Visit: The patient's condition can be safely assessed and treated via synchronous audio and visual telemedicine encounter.      Reason for Telemedicine Visit: Patient has requested telehealth visit    Originating Site (Patient Location): Patient's home    Distant Site (Provider Location): clinic    Consent:  The patient/guardian has verbally consented to: the potential risks and benefits of telemedicine (video visit) versus in person care; bill my insurance or make self-payment for services provided; and responsibility for payment of non-covered services.     Mode of Communication:  Video Conference via Zoom    As the provider I attest to compliance with applicable laws and regulations related to telemedicine.    Current Symptoms/Status:  Patient continues with less with daily urges.  Feels significant improvement since starting Naltrexone.    Now taking 50 mg.  Started Prozac 20 Mg  Started using alcohol recently, less than 2 glasses of wine per night. Expressed concern that this historically has led to boundary violations.  Reported urges, denied boundary violations.     Progress Toward Treatment Goals:   Staying within boundaries, although he reported some urges. Continues to work on improving intimacy with his wife.     Intervention: Modality and Description:  Utilized supportive and CBT techniques to address CSB and related mental health issues.        Response to Intervention:  Client seemed to be less on script today.  Was more vulnerable and open to feedback.  Continued to process aspects of his existential anxiety  Was an  active participant and was motivated to continue working on therapeutic tasks.      MENTAL STATUS EXAM  Appearance: appropriate  Attitude: cooperative  Behavior: normal  Eye Contact: normal  Speech: normal  Orientation: oriented to person , place, time and situation  Mood:  admits sadness and anxiety most of time  Affect: Mood Congruent  Thought Process: clear  Suicidal Ideation: none  Hallucination: no     Assignment:  Read Daring Greatly.     Interactive Complexity:  There are four specific communication difficulties that complicate the work of the primary psychiatric procedure.  Interactive complexity (+78004) may be reported when at least one of these difficulties is present.     Communication difficulties present during current the psychiatric procedure include:  1. None.     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.8) Other Specified Disruptive, Impulse Control, and Conduct Disorder (Hypersexual Disorder)    Adjustment Disorder with mixed emotional  features    Mason Tena, PhD

## 2022-10-30 NOTE — PROGRESS NOTES
[facility]  [facility]  [address], [address]  [place]  [address]    Group Progress Note     10/27/22       Client Name: Ramin Murcia  YOB: 1955  MRN:  [id number]  Treating Provider: Mason Adkins, PhD and Yenifer Bacon, PhD  Type of Session: Group     Video start time: 4:00  Video end time: 6:00    Telemedicine Visit: The patient's condition can be safely assessed and treated via synchronous audio and visual telemedicine encounter.      Reason for Telemedicine Visit: Patient has requested telehealth visit    Originating Site (Patient Location): Patient's home    Distant Site (Provider Location): clinic    Consent:  The patient/guardian has verbally consented to: the potential risks and benefits of telemedicine (video visit) versus in person care; bill my insurance or make self-payment for services provided; and responsibility for payment of non-covered services. ess    Mode of Communication:  Video Conference via Zoom    As the provider I attest to compliance with applicable laws and regulations related to telemedicine.    Current Symptoms/Status:  Patient continues with less with daily urges.  Feels significant improvement since starting Naltrexone.    Now taking 50 mg.  Started Prozac 20 Mg  Started using alcohol recently, less than 2 glasses of wine per night. Had boundary violation regarding drinking.    Reported sexual urges, denied boundary violations.     Progress Toward Treatment Goals:   Staying within sexual boundaries, although he reported some urges. Continues to work on improving intimacy with his wife.     Intervention: Modality and Description:  Utilized supportive and CBT techniques to address CSB and related mental health issues.        Response to Intervention:  Client shared his recent boundary violation related to drinking - which is often a precursor to sexual boundary violations. Reviewed his cycle.  Challenged him  about the details. Seem to develop a better understanding of his cycle including awareness of his feelings of entitlement.  Felt good about the feedback he received from group.  Was an active participant.    MENTAL STATUS EXAM  Appearance: appropriate  Attitude: cooperative  Behavior: normal  Eye Contact: normal  Speech: normal  Orientation: oriented to person , place, time and situation  Mood:  admits sadness and anxiety most of time  Affect: Mood Congruent  Thought Process: clear  Suicidal Ideation: none  Hallucination: no     Assignment:  Read Daring Greatly.     Interactive Complexity:  There are four specific communication difficulties that complicate the work of the primary psychiatric procedure.  Interactive complexity (+07961) may be reported when at least one of these difficulties is present.     Communication difficulties present during current the psychiatric procedure include:  1. None.     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.8) Other Specified Disruptive, Impulse Control, and Conduct Disorder (Hypersexual Disorder)    Adjustment Disorder with mixed emotional  features    Mason Tena, PhD

## 2022-10-30 NOTE — PROGRESS NOTES
[facility] -  Case Progress Note     10/10/22       Client Name: Ramin Murcia  YOB: 1955  MRN:  [id number]  Treating Provider: Yenifer Bacon, PhD LP  Type of Session:  Individual  Present in Session: Patient    Time: 55 min.     In person     Current Symptoms/Status:  Patient continues with less with daily urges.  Feels significant improvement since starting Naltrexone.    Now taking 50 mg Prozac as well.  Feels that is improving symptoms as well.  Reduced urges.  Feels less antsy.     Progress Toward Treatment Goals:   Feeling much better.  Gaining further understanding the source of his hypersexuality.  Less urges. Staying within boundaries.     Intervention: Modality and Description:  Interpersonal, relapse presention, CBT     Response to Intervention:  Reviewed boundaries.  Frustrated that he cannot be completley in control. Existential anxiety.  Talked about getting more meaning and purpose into his life.    Encouraged him to start working on sex hitory and do more writing.    Patient felt heard, and understood better his CSB.  Motivated to do more work.    How much should he go into issues with father?  Next youngest brother - was also abused.  Guilt about not protecting him.  Tried to talk to uncle but he was unresponsive.     MENTAL STATUS EXAM  Appearance: appropriate  Attitude: cooperative  Behavior: normal  Eyre Contact: normal  Speech: normal  Orientation: oreinted to person , place, time and situation  Mood:  admits sadness and anxiety most of time  Affect: Mood Congruient  Thought Process: clear  Suicidal Ideation: none  Hallucination: no     Assignment:  Read Daring Greatly.     Interactive Complexity:  There are four specific communication difficulties that complicate the work of the primary psychiatric procedure.  Interactive complexity (+71588) may be reported when at least one of these difficulties is present.     Communication difficulties present during current the  psychiatric procedure include:  1. None.     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.8) Other Specified Disruptive, Impulse Control, and Conduct Disorder (Hypersexual Disorder)    Adjustment Disorder with mixed emotional  features     TREATMENT PLAN  Date of Treatment Plan 22  Name: Ramin Murcia  : 1955  Medical Record Number: [id number]  Treating Provider: Yenifer Bacon, PhD        Current Status:  Depression/Mood:  Sad  Increased Crying  Decreased Interest  Decreased Self-Esteem  Guilt  Hopelessness/Helplessness  Dysphoria     Anxiety/Panic:  Worry  Intrusive Thoughts     Thought:   Reports no problems or symptoms at this time     Sensorium:  Reports no problems or symptoms at this time     Behavior/Health:  Compulsive  Relationship Problems     Chemical Use:  Denies both Alcohol and Drug Abuse     Sexual Problems:  Compulsive sexual behavior     Suicide Risk Assessment:  Assessed Level of Immediate Risk:  YES  Ideation:  NO  Plan:  NO  Means:  NO  Intent:  YES     Homicide Risk Assessment:  Assessed Level of Immediate Risk:  NO  Ideation:  NO  Plan:  NO  Means:  NO  Intent:  NO     Impact of Symptoms on Function:  Decreased Social/Family Function     DSM-5 Diagnoses:         Screening Questionnaires:  Please indicate whether the following screening questionnaires have been completed:  CAGE: Yes  PHQ-9: Yes    YONY-7: Yes  WHODAS: No     Problem(s):  1. Compulsive sexual behavior  2. Depresssion  3. Existential crisis     Short-Long Term Goals  Decrease Symptoms  Increase Coping  Change Behavior  Change Cognitions  Increase Psychosocial/Support Functioning  Monitor Psych Status  Improve Communication  Enhance Relationships     Interventions  [facility]  Cognitive-Behavioral Therapy  Medication Management Referral  Bibliotherapy     Expected Outcomes and Prognosis  Expected improvement     Anticipated Treatment Duration:  Unknown     Frequency of Sessions  2 x / Month     Progress Update (for Plan Update  Only)  NA:  1st Treatment Planning     Current Psychoactive Medications:  See Psychiatric Treatment Plan  Discharge & Aftercare Goals: To Be Determined.  Care Coordination: Yes     Consent to Treatment:   Patient participated in this treatment planning process and indicated verbal agreement with the above treatment plan.  If patient doesn't sign, indicate why: Telehealth visit due to COVID19 pandemic     Patient Signature: Ramin Murcia   Date: 5-9-22        Provider Signature: Yenifer Bacon PhD, LP  Date: 5-9-22

## 2022-10-30 NOTE — PROGRESS NOTES
[facility]  [facility]  [address], [address]  [place]  [address]    Group Progress Note     10/20/22       Client Name: Ramin Murcia  YOB: 1955  MRN:  [id number]  Treating Provider: Yenifer Bacon, PhD  Type of Session: Group     Video start time: 4:00  Video end time: 6:00    Telemedicine Visit: The patient's condition can be safely assessed and treated via synchronous audio and visual telemedicine encounter.      Reason for Telemedicine Visit: Patient has requested telehealth visit    Originating Site (Patient Location): Patient's home    Distant Site (Provider Location): clinic    Consent:  The patient/guardian has verbally consented to: the potential risks and benefits of telemedicine (video visit) versus in person care; bill my insurance or make self-payment for services provided; and responsibility for payment of non-covered services.     Mode of Communication:  Video Conference via Zoom    As the provider I attest to compliance with applicable laws and regulations related to telemedicine.    Current Symptoms/Status:  Patient continues with less with daily urges.  Feels significant improvement since starting Naltrexone.    Now taking 50 mg.  Started Prozac 20 Mg  Started using alcohol recently, less than 2 glasses of wine per night. Expressed concern that this historically has led to boundary violations.  Reported urges, denied boundary violations.     Progress Toward Treatment Goals:   Staying within boundaries, although he reported some urges. Continues to work on improving intimacy with his wife.     Intervention: Modality and Description:  Utilized supportive and CBT techniques to address CSB and related mental health issues.        Response to Intervention:  Client continues to be less on script today.   Staying within alcohol boundaries.  Noticed that this libido is much less.  Feels normal!!  As a result feeling confident that he is  moving in the right direction.  Received positive feedback from others.      MENTAL STATUS EXAM  Appearance: appropriate  Attitude: cooperative  Behavior: normal  Eye Contact: normal  Speech: normal  Orientation: oriented to person , place, time and situation  Mood:  admits sadness and anxiety most of time  Affect: Mood Congruent  Thought Process: clear  Suicidal Ideation: none  Hallucination: no     Assignment:  Read Daring Greatly.     Interactive Complexity:  There are four specific communication difficulties that complicate the work of the primary psychiatric procedure.  Interactive complexity (+19326) may be reported when at least one of these difficulties is present.     Communication difficulties present during current the psychiatric procedure include:  1. None.     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.8) Other Specified Disruptive, Impulse Control, and Conduct Disorder (Hypersexual Disorder)    Adjustment Disorder with mixed emotional  features    Yenifer Bacon, PhD

## 2022-11-01 NOTE — TELEPHONE ENCOUNTER
Requested Medication: Prozac  Dose: 20 mg  Quantity: 60 capsules  Refills:  0    Take 2 capsules (20 mg) by mouth daily     Last seen at [facility]: 09/08/2022  Next Appointment with Provider:  12/08/2022    AMPARO Frazeir

## 2022-11-02 RX ORDER — FLUOXETINE 10 MG/1
CAPSULE ORAL
Qty: 60 CAPSULE | Refills: 0 | Status: SHIPPED | OUTPATIENT
Start: 2022-11-02 | End: 2022-11-30

## 2022-11-07 NOTE — PROCEDURES
I did not personally see the patient. I reviewed and agree with the assessment and plan of this note.       Anusha Hernandez, PhD, LP    [facility]  Department of Family Medicine and Community Health  [facility]

## 2022-11-07 NOTE — PROCEDURES
I did not personally see the patient. I reviewed and agree with the assessment and plan of this note.       Anusha Hernandez, PhD, LP    [facility] in [facility] Sexuality[facility]  Department of Family Medicine and Community Health  [facility]

## 2022-11-10 ENCOUNTER — VIRTUAL VISIT (OUTPATIENT)
Dept: PSYCHOLOGY | Facility: CLINIC | Age: 67
End: 2022-11-10
Payer: COMMERCIAL

## 2022-11-10 ENCOUNTER — OFFICE VISIT (OUTPATIENT)
Dept: PSYCHOLOGY | Facility: CLINIC | Age: 67
End: 2022-11-10
Payer: COMMERCIAL

## 2022-11-10 DIAGNOSIS — F91.8 CONDUCT DISORDER, UNDIFFERENTIATED TYPE: Primary | ICD-10-CM

## 2022-11-10 DIAGNOSIS — F41.9 ANXIETY DISORDER, UNSPECIFIED TYPE: ICD-10-CM

## 2022-11-10 DIAGNOSIS — F43.23 ADJUSTMENT DISORDER WITH MIXED ANXIETY AND DEPRESSED MOOD: ICD-10-CM

## 2022-11-10 PROCEDURE — 90853 GROUP PSYCHOTHERAPY: CPT | Mod: GT

## 2022-11-10 PROCEDURE — 90837 PSYTX W PT 60 MINUTES: CPT | Mod: 59 | Performed by: STUDENT IN AN ORGANIZED HEALTH CARE EDUCATION/TRAINING PROGRAM

## 2022-11-10 NOTE — PROGRESS NOTES
[facility] - Progress Note    Date of Service: 11/10/22   Name: Ramin Murcia  : 1955  Medical Record Number: [id number]  Treating Provider: Mason Adkins, PhD  Type of Session: Individual  Present in Session: Client and therapist  Session Start and Stop Time: 11:00am-11:55am  Number of Minutes:  55    SERVICE MODALITY:  In-person    DSM-5 Diagnoses:  Encounter Diagnoses   Name Primary?     Adjustment disorder with mixed anxiety and depressed mood      Anxiety disorder, unspecified type      Other specified disruptive, impulse control, and conduct disorder Yes     Current Reported Symptoms and Status update:  Client reported some urges and fantasies related to CSB concerns. He denied boundary violations. He reported he feels less depressed and more in control of behavior with Naltrexone and Prozac. He reported some sexual side effects (delayed orgasm).     Transferred care from Dr. Bacon to Dr. Adkins 11/10/22    Progress Toward Treatment Goals:   Satisfactory progress        Therapeutic Interventions/Treatment Strategies:    Area(s) of treatment focus addressed in this session included Symptom Management    The focus of today's session was on establishing care with with this provider after transferring from Dr. Bacon. Therapist reviewed privacy/confidentiality and therapist's status as a postdoctoral fellow under supervision. Client shared his history of CSB acting out and what he has learned about his sexuality. He shared progress he has made in treatment, including staying within boundaries and challenges to staying within boundaries. Client explained how he is finding meaning/purpose in life through his work on [employer] and volunteering. He expressed looking forward to continuing in treatment.    Psychotherapist offered support, feedback and validation and reinforced use of skills Treatment modalities used include Interpersonal  Support, Redirection and  Feedback    Patient Response:   Patient responded to session by accepting feedback, giving feedback, listening, focusing on goals and accepting support  Possible barriers to participation / learning include: N/A    Current Mental Status Exam:   Appearance:  Appropriate   Eye Contact:  Good   Attitude / Demeanor: Cooperative   Speech      Rate / Production: Normal/ Responsive      Volume:  Normal  volume  Orientation:  All  Mood:   Normal  Affect:   Appropriate   Thought Content: Clear   Insight:   Good       Plan/Need for Future Services:  Return for therapy in 2 weeks to treat diagnosed problems.    Patient has a current master individualized treatment plan.  See Epic treatment plan for more information.    Referral / Collaboration:  Referral to another professional/service is not indicated at this time..  Emergency Services Needed?  No    Assignment:  Plan to update treatment plan at next session.    Interactive Complexity:  There are four specific communication difficulties that complicate the work of the primary psychiatric procedure.  Interactive complexity (+95970) may be reported when at least one of these difficulties is present.    Communication difficulties present during current the psychiatric procedure include:  1. None.      Signature/Title:    Mason Adkins, PhD, LP  Postdoctoral Fellow

## 2022-11-10 NOTE — PROGRESS NOTES
[facility]  [facility]  [address], [address]  [place]  [address]    Group Progress Note     11/10/22       Client Name: Ramin Murcia  YOB: 1955  MRN:  [id number]  Treating Provider: Mason Adkins, PhD   Type of Session: Group     Video start time: 4:00  Video end time: 6:00    Telemedicine Visit: The patient's condition can be safely assessed and treated via synchronous audio and visual telemedicine encounter.      Reason for Telemedicine Visit: Patient has requested telehealth visit    Originating Site (Patient Location): Patient's home    Distant Site (Provider Location): clinic    Consent:  The patient/guardian has verbally consented to: the potential risks and benefits of telemedicine (video visit) versus in person care; bill my insurance or make self-payment for services provided; and responsibility for payment of non-covered services. ess    Mode of Communication:  Video Conference via Zoom    As the provider I attest to compliance with applicable laws and regulations related to telemedicine.    Current Symptoms/Status:  Patient continues with less with daily urges.  Feels significant improvement since starting Naltrexone.    Now taking 50 mg.  Started Prozac 20 Mg  Started using alcohol recently, less than 2 glasses of wine per night. Had boundary violation regarding drinking.    Reported sexual urges, denied boundary violations.     Progress Toward Treatment Goals:    Staying within sexual boundaries, although he reported some urges. Wants increased connection with his wife.     Intervention: Modality and Description:  Utilized supportive and CBT techniques to address CSB and related mental health issues.        Response to Intervention:  Client shared how this week is the 2 year anniversary of his wife discovering CSB. He reflected on progress he has made in treatment and being more honest/authentic. He talked about next steps in  his treatment and his work on acceptance. He is working to be mindful and grateful. He provided feedback to others and was receptive to feedback from others. Was an active participant.    MENTAL STATUS EXAM  Appearance: appropriate  Attitude: cooperative  Behavior: normal  Eye Contact: normal  Speech: normal  Orientation: oriented to person , place, time and situation  Mood:  admits sadness and anxiety most of time  Affect: Mood Congruent  Thought Process: clear  Suicidal Ideation: none  Hallucination: no     Assignment:  Read Daring Greatly.     Interactive Complexity:  There are four specific communication difficulties that complicate the work of the primary psychiatric procedure.  Interactive complexity (+85750) may be reported when at least one of these difficulties is present.     Communication difficulties present during current the psychiatric procedure include:  1. None.     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.8) Other Specified Disruptive, Impulse Control, and Conduct Disorder (Hypersexual Disorder)    Adjustment Disorder with mixed emotional  features  Unspecified anxiety disorder    Mason Adkins, PhD

## 2022-11-17 ENCOUNTER — VIRTUAL VISIT (OUTPATIENT)
Dept: PSYCHOLOGY | Facility: CLINIC | Age: 67
End: 2022-11-17
Payer: COMMERCIAL

## 2022-11-17 DIAGNOSIS — F41.9 ANXIETY DISORDER, UNSPECIFIED TYPE: ICD-10-CM

## 2022-11-17 DIAGNOSIS — F91.8 CONDUCT DISORDER, UNDIFFERENTIATED TYPE: Primary | ICD-10-CM

## 2022-11-17 DIAGNOSIS — F43.23 ADJUSTMENT DISORDER WITH MIXED ANXIETY AND DEPRESSED MOOD: ICD-10-CM

## 2022-11-17 DIAGNOSIS — F91.8 CONDUCT DISORDER, UNDIFFERENTIATED TYPE: ICD-10-CM

## 2022-11-17 PROCEDURE — 90853 GROUP PSYCHOTHERAPY: CPT | Mod: GT

## 2022-11-17 RX ORDER — NALTREXONE HYDROCHLORIDE 50 MG/1
TABLET, FILM COATED ORAL
Qty: 90 TABLET | Refills: 0 | Status: SHIPPED | OUTPATIENT
Start: 2022-11-17 | End: 2022-11-21

## 2022-11-17 NOTE — TELEPHONE ENCOUNTER
Requested Medication: Naltrexone 50 mg tablets   Dose:  50 mg  Quantity: 90 tablets  Refills: 0    Take 1 tablet daily.    Last seen at [facility]:  08/04/2022    Next Appointment with Provider:  Visit date not found    AMPARO Frazier

## 2022-11-17 NOTE — GROUP NOTE
[facility]  [address], [address]  [place]  [address]    Group Progress Note  [facility] - Progress Note    Date of Service: 22   Name: Ramin Murcia  : 1955  Medical Record Number: [id number]  START TIME:  4:00 PM  END TIME:  5:50 PM  FACILITATOR(S): Mason Adkins, PhD  TOPIC: [facility] Group Therapy  Type of Session: Group  :  Number of Attendees:  8  Number of Minutes:  110    SERVICE MODALITY:  Video Visit:      Provider verified identity through the following two step process.  Patient provided:  Patient is known previously to provider    Telemedicine Visit: The patient's condition can be safely assessed and treated via synchronous audio and visual telemedicine encounter.      Reason for Telemedicine Visit: Services only offered telehealth    Originating Site (Patient Location): Patient's home    Distant Site (Provider Location): [facility]: [facility]    Consent:  The patient/guardian has verbally consented to: the potential risks and benefits of telemedicine (video visit) versus in person care; bill my insurance or make self-payment for services provided; and responsibility for payment of non-covered services.     Patient would like the video invitation sent by:  My Chart    Mode of Communication:  Video Conference via Amwell    Distant Location (Provider):  On-site    As the provider I attest to compliance with applicable laws and regulations related to telemedicine.      DSM-5 Diagnoses:  Encounter Diagnoses   Name Primary?     Adjustment disorder with mixed anxiety and depressed mood      Anxiety disorder, unspecified type      Other specified disruptive, impulse control, and conduct disorder (hypersexual disorder) Yes         Current Reported Symptoms and Status update:  Changes since last session include no boundary violations but feeling some compulsive urges.    Patient's problems with out of  control, driven, impulsive, compulsive sexual behavior began in early 2020. He has been in a stable loving marriage for 40 years then began having covert hook-ups with young men.     Efforts to change problematic behaviors have included several years of psychotherapy and attenance in [facility]. He continues to struggle with recurrent and intense urges which cause him distress.     Client has been struggling with symptoms of depression and anxiety secondary to group home and changing roles. These symptoms have impacted his functioning and exacerbate his CSB concerns.    Progress Toward Treatment Goals:   Satisfactory progress     Therapeutic Interventions/Treatment Strategies:    Area(s) of treatment focus addressed in this session included Symptom Management    Processed feelings about terminating therapeutic/group relationship with Dr. Bacon. Reflected on his progress in treatment and how work with Dr. Bacon supported his groups. Shared hopes for the group moving forward and planned ways to have the group continue to meet treatment needs. Ramin reflected on feeling validated by Dr. Bacon, and expressed gratitude for continuing in treatment. He provided supportive input to others and was receptive to feedback from others.  Psychotherapist offered support, feedback and validation and set limits Treatment modalities used include [facility] THERAPY INTERVENTIONS: Interpersonal  Support, Redirection and Feedback    Patient Response:   Patient responded to session by accepting feedback, giving feedback, listening and focusing on goals  Possible barriers to participation / learning include: N/A    Current Mental Status Exam:   Appearance:  Appropriate   Eye Contact:  Good   Attitude / Demeanor: Cooperative   Speech      Rate / Production: Normal/ Responsive      Volume:  Normal  volume  Orientation:  All  Mood:   Normal  Affect:   Appropriate   Thought Content: Clear   Insight:   Good       Plan/Need for Future Services:  Return  for therapy in 2 weeks to treat diagnosed problems.    Patient has a current master individualized treatment plan.  See Epic treatment plan for more information.    Referral / Collaboration:  Referral to another professional/service is not indicated at this time..  Emergency Services Needed?  No    Assignment:  History/antecedents    Interactive Complexity:  There are four specific communication difficulties that complicate the work of the primary psychiatric procedure.  Interactive complexity (+79892) may be reported when at least one of these difficulties is present.    Communication difficulties present during current the psychiatric procedure include:  1. None.      Signature/Title:      Mason Adkins, PhD, LP  Postdoctoral Fellow

## 2022-11-18 DIAGNOSIS — F91.8 CONDUCT DISORDER, UNDIFFERENTIATED TYPE: ICD-10-CM

## 2022-11-18 NOTE — TELEPHONE ENCOUNTER
Requested Medication: Naltrexone 50 mg tablets  Dose: 50 mg  Quantity: 90 tablets  Refills: 0    Take 1 tablet (50mg)  Daily.    Last seen at [facility]: 09/08/2022  Next Appointment with Provider:  12/08/2022

## 2022-11-21 ENCOUNTER — OFFICE VISIT (OUTPATIENT)
Dept: PSYCHOLOGY | Facility: CLINIC | Age: 67
End: 2022-11-21
Payer: COMMERCIAL

## 2022-11-21 DIAGNOSIS — F43.23 ADJUSTMENT DISORDER WITH MIXED ANXIETY AND DEPRESSED MOOD: ICD-10-CM

## 2022-11-21 DIAGNOSIS — F91.8 CONDUCT DISORDER, UNDIFFERENTIATED TYPE: Primary | ICD-10-CM

## 2022-11-21 DIAGNOSIS — F41.9 ANXIETY DISORDER, UNSPECIFIED TYPE: ICD-10-CM

## 2022-11-21 PROCEDURE — 90837 PSYTX W PT 60 MINUTES: CPT | Mod: HN | Performed by: STUDENT IN AN ORGANIZED HEALTH CARE EDUCATION/TRAINING PROGRAM

## 2022-11-21 RX ORDER — NALTREXONE HYDROCHLORIDE 50 MG/1
TABLET, FILM COATED ORAL
Qty: 90 TABLET | Refills: 0 | Status: SHIPPED | OUTPATIENT
Start: 2022-11-21 | End: 2024-03-18

## 2022-11-21 NOTE — PROGRESS NOTES
[facility] [facility] - Progress Note    Date of Service: 22   Name: Ramin Murcia  : 1955  Medical Record Number: [id number]  Treating Provider: Mason Adkins, PhD  Type of Session: Individual  Present in Session: Client and therapist  Session Start and Stop Time: 11:00am-11:55am  Number of Minutes:  55    SERVICE MODALITY:  In-person    DSM-5 Diagnoses:  Encounter Diagnoses   Name Primary?     Other specified disruptive, impulsive control, and conduct disorder (hypersexuality) Yes     Anxiety disorder, unspecified type      Adjustment disorder with mixed anxiety and depressed mood      Current Reported Symptoms and Status update:  Changes since last session include sexual functioning concerns secondary to potential medication side effects, some worry about risk of violating boundaries, and no current boundary violations.    Patient's problems with out of control, driven, impulsive, compulsive sexual behavior began in early . He has been in a stable loving marriage for 40 years then began having covert hook-ups with young men.     Efforts to change problematic behaviors have included several years of psychotherapy and attenance in [facility]. He continues to struggle with recurrent and intense urges which cause him distress.     Client has been struggling with symptoms of depression and anxiety secondary to residential and changing roles. These symptoms have impacted his functioning and exacerbate his CSB concerns.      Progress Toward Treatment Goals:   Satisfactory progress     Transferred care from Dr. Bacon to Dr. Adkins 11/10/22    Therapeutic Interventions/Treatment Strategies:    Area(s) of treatment focus addressed in this session included Symptom Management, Interpersonal Relationship Skills and Sexual Health and Wellness    Client and therapist reviewed the CSB treatment model, typical course of treatment, and recent changes to client's treatment (e.g., Dr. Bacon  "leaving CSB treatment group). Client and therapist discussed similarities and differences between interventions and philosophy of care used in this clinic versus in his [facility] group. He noted he does not agree with everything in his [facility] group but appreciates support from his sponsor. Client processed Dr. Bacon leaving treatment and expressed comfort in continuing care with this provider. Client shared that he tends to like being \"in control\". We discussed roles in which this was likely effective, and ways in which it may have facilitated impression management so as to present one way and engage in CSB \"behind the scenes.\" Client shared that he currently has difficulties with orgasm (delayed orgasm, decreased intensity, sometimes orgasm without ejaculation, frustration during sex). We discussed sex therapy approaches we could consider as symptoms continue; client shared he wants to continue monitoring for now. At the end of the session, client shared that his wife will be gone tonight and he has concern about risk for violating boundaries - particularly around alcohol use. Client and therapist discussed having client write down risk factors and urges for boundary violations, paying special attention to feelings and thoughts. Assigned client to add his history of alcohol use to his overall antecedents/history to review at next session.    Psychotherapist offered support, feedback and validation and reinforced use of skills Treatment modalities used include Cognitive Behavioral Therapy Interpersonal  Support, Redirection, Feedback and Limit/Boundaries    Patient Response:   Patient responded to session by accepting feedback, giving feedback, listening, focusing on goals and accepting support  Possible barriers to participation / learning include: N/A    Current Mental Status Exam:   Appearance:  Appropriate   Eye Contact:  Good   Attitude / Demeanor: Cooperative   Speech      Rate / Production: Normal/ Responsive      " Volume:  Normal  volume  Orientation:  All  Mood:   Normal  Affect:   Appropriate   Thought Content: Clear   Insight:   Good       Plan/Need for Future Services:  Return for therapy in 2 weeks to treat diagnosed problems.    Patient has a current master individualized treatment plan.  See Epic treatment plan for more information.    Referral / Collaboration:  Referral to another professional/service is not indicated at this time..  Emergency Services Needed?  No    Assignment:  Explore urges/risk factors for boundary violations  Add alcohol to history to review at next session    Interactive Complexity:  There are four specific communication difficulties that complicate the work of the primary psychiatric procedure.  Interactive complexity (+35235) may be reported when at least one of these difficulties is present.    Communication difficulties present during current the psychiatric procedure include:  1. None.      Signature/Title:    Mason Adkins, PhD, LP  Postdoctoral Fellow

## 2022-11-22 NOTE — PROCEDURES
I did not personally see the patient. I reviewed and agree with the assessment and plan of this note.       Anusha Hernandez, PhD, LP    Program in Human Sexuality, [facility]  Department of Family Medicine and Community Health  [facility]

## 2022-11-22 NOTE — PROCEDURES
I did not personally see the patient. I reviewed and agree with the assessment and plan of this note.       Anusha Hernandez, PhD, LP    Program in Human Sexuality[facility]  Department of Family Medicine and Community Health  [facility]

## 2022-11-29 DIAGNOSIS — F91.8 CONDUCT DISORDER, UNDIFFERENTIATED TYPE: ICD-10-CM

## 2022-11-29 NOTE — TELEPHONE ENCOUNTER
Requested Medication: Fluoxetine 10mg  Dose: 20mg  Quantity: 60  Refills: 0    Take 2 (10mg) tablets daily    Last seen at [facility]: 9/8 - 3 mo  Next Appointment with Provider: 12/8      Dior Mike CMA

## 2022-11-29 NOTE — PROCEDURES
I did not personally see the patient. I reviewed and agree with the assessment and plan of this note.       Anusha Hernandez, PhD, LP    Program in Human Sexuality[facility]  Department of Family Medicine and Community Health  [facility]

## 2022-11-30 RX ORDER — FLUOXETINE 10 MG/1
CAPSULE ORAL
Qty: 60 CAPSULE | Refills: 0 | Status: SHIPPED | OUTPATIENT
Start: 2022-11-30 | End: 2022-12-29

## 2022-12-01 ENCOUNTER — VIRTUAL VISIT (OUTPATIENT)
Dept: PSYCHOLOGY | Facility: CLINIC | Age: 67
End: 2022-12-01
Payer: COMMERCIAL

## 2022-12-01 DIAGNOSIS — F41.9 ANXIETY DISORDER, UNSPECIFIED TYPE: ICD-10-CM

## 2022-12-01 DIAGNOSIS — F43.23 ADJUSTMENT DISORDER WITH MIXED ANXIETY AND DEPRESSED MOOD: ICD-10-CM

## 2022-12-01 DIAGNOSIS — F91.8 CONDUCT DISORDER, UNDIFFERENTIATED TYPE: Primary | ICD-10-CM

## 2022-12-01 PROCEDURE — 90853 GROUP PSYCHOTHERAPY: CPT | Mod: GT

## 2022-12-01 NOTE — GROUP NOTE
[facility]  [address], [address]  [place]  [address]    Group Progress Note  [facility] - Progress Note    Date of Service: 22   Name: Ramin Murcia  : 1955  Medical Record Number: [id number]  START TIME:  4:00 PM  END TIME:  6:00 PM  FACILITATOR(S): Mason Adkins, PhD  TOPIC: [facility] Group Therapy  Type of Session: Group  :  Number of Attendees:  6  Number of Minutes:  120    SERVICE MODALITY:  Video Visit:      Provider verified identity through the following two step process.  Patient provided:  Patient is known previously to provider    Telemedicine Visit: The patient's condition can be safely assessed and treated via synchronous audio and visual telemedicine encounter.      Reason for Telemedicine Visit: Services only offered telehealth    Originating Site (Patient Location): Patient's home    Distant Site (Provider Location): [facility] Clinics: Sexual [facility] Health Clinic    Consent:  The patient/guardian has verbally consented to: the potential risks and benefits of telemedicine (video visit) versus in person care; bill my insurance or make self-payment for services provided; and responsibility for payment of non-covered services.     Patient would like the video invitation sent by:  Other e-mail: see [email or web address]    Mode of Communication:  Video Conference via Medical Zoom    Distant Location (Provider):  On-site    As the provider I attest to compliance with applicable laws and regulations related to telemedicine.      DSM-5 Diagnoses:  Encounter Diagnoses   Name Primary?     Adjustment disorder with mixed anxiety and depressed mood      Anxiety disorder, unspecified type      Other specified disruptive, impulse control, and conduct disorder Yes         Current Reported Symptoms and Status update:  Changes since last session: feeling physically ill; no boundary violations.    Patient's problems with out of control, driven,  "impulsive, compulsive sexual behavior began in early 2020. He has been in a stable loving marriage for 40 years then began having covert hook-ups with young men.  Efforts to change problematic behaviors have included several years of psychotherapy and attenance in [facility]. He continues to struggle with recurrent and intense urges which cause him distress.     Client has been struggling with symptoms of depression and anxiety secondary to care home and changing roles. These symptoms have impacted his functioning and exacerbate his CSB concerns.      Progress Toward Treatment Goals:   Satisfactory progress     Therapeutic Interventions/Treatment Strategies:    Area(s) of treatment focus addressed in this session included Symptom Management and Interpersonal Relationship Skills    Client identified a tendency to need to be in control. Shared triggers to urges. Also shared that he is not proud of his triggers/urges and was worried he was \"crazy\". Reflected that hearing others' similar experiences was validating. Working to reduce shame. Actively participated in the group session and provided feedback to others.    Psychotherapist offered support, feedback and validation and reinforced use of skills Treatment modalities used include [facility] THERAPY INTERVENTIONS: Cognitive Behavioral Therapy  Support and Feedback    Patient Response:   Patient responded to session by accepting feedback, giving feedback, listening, focusing on goals and accepting support  Possible barriers to participation / learning include: N/A    Current Mental Status Exam:   Appearance:  Appropriate   Eye Contact:  Good   Attitude / Demeanor: Cooperative   Speech      Rate / Production: Normal/ Responsive      Volume:  Normal  volume  Orientation:  All  Mood:   Normal  Affect:   Appropriate   Thought Content: Clear   Insight:   Good       Plan/Need for Future Services:  Return for therapy in 1 week to treat diagnosed problems.    Patient has a current master " individualized treatment plan.  See Epic treatment plan for more information.    Referral / Collaboration:  Referral to another professional/service is not indicated at this time..  Emergency Services Needed?  No    Assignment:  Continue history/antecedents to outline cycle    Interactive Complexity:  There are four specific communication difficulties that complicate the work of the primary psychiatric procedure.  Interactive complexity (+77505) may be reported when at least one of these difficulties is present.    Communication difficulties present during current the psychiatric procedure include:  1. None.      Signature/Title:    Mason Adkins, PhD, LP

## 2022-12-05 ENCOUNTER — OFFICE VISIT (OUTPATIENT)
Dept: PSYCHOLOGY | Facility: CLINIC | Age: 67
End: 2022-12-05
Payer: COMMERCIAL

## 2022-12-05 DIAGNOSIS — F43.23 ADJUSTMENT DISORDER WITH MIXED ANXIETY AND DEPRESSED MOOD: ICD-10-CM

## 2022-12-05 DIAGNOSIS — F41.9 ANXIETY DISORDER, UNSPECIFIED TYPE: ICD-10-CM

## 2022-12-05 DIAGNOSIS — F91.8 CONDUCT DISORDER, UNDIFFERENTIATED TYPE: Primary | ICD-10-CM

## 2022-12-05 PROCEDURE — 90834 PSYTX W PT 45 MINUTES: CPT | Performed by: STUDENT IN AN ORGANIZED HEALTH CARE EDUCATION/TRAINING PROGRAM

## 2022-12-05 NOTE — PROGRESS NOTES
[place] for Sexual and Gender Health - Progress Note    Date of Service: 22   Name: Ramin Murcia  : 1955  Medical Record Number: [id number]  Treating Provider: Mason Adkins, PhD  Type of Session: Individual  Present in Session: Client and therapist  Session Start and Stop Time: 11:00am-11:50am  Number of Minutes:  50    SERVICE MODALITY:  In-person    DSM-5 Diagnoses:  Encounter Diagnoses   Name Primary?     Adjustment disorder with mixed anxiety and depressed mood      Other specified disruptive, impulse control, and conduct disorder Yes     Anxiety disorder, unspecified type        Current Reported Symptoms and Status update:  Changes since last session: maintained boundaries, some urges to violate boundaries, reflecting on impact of adverse childhood experiences on CSB-related concerns.    Patient's problems with out of control, driven, impulsive, compulsive sexual behavior began in early . He has been in a stable loving marriage for 40 years then began having covert hook-ups with young men.  Efforts to change problematic behaviors have included several years of psychotherapy and attenance in [facility]. He continues to struggle with recurrent and intense urges which cause him distress.     Client has been struggling with symptoms of depression and anxiety secondary to prison and changing roles. These symptoms have impacted his functioning and exacerbate his CSB concerns.      Progress Toward Treatment Goals:   Satisfactory progress     Transferred care from Dr. Bacon to Dr. Adkins 11/10/22    Therapeutic Interventions/Treatment Strategies:    Area(s) of treatment focus addressed in this session included Symptom Management and Interpersonal Relationship Skills    Client shared that he has stayed in his boundaries but notices urges when he is alone. We processed factors that may contribute to these urges, including feeling controlled, boxed in, and innate sexual desires. Client and  "therapist discussed his \"wants\" in his relationship. He discussed how staying in his marriage and commitments to his wife are important to him. We planned for client and his wife to meet at next follow-up for collateral. Client reflected that he wants to do more processing around the impacts of adverse childhood experiences on his CSB-related concerns. We planned for therapist to more thoroughly assess trauma symptoms and potential to start written exposure therapy after the holidays. Client expressed gratitude for planning this aspect of treatment and stated he believes this fits into what he needs.     Psychotherapist offered support, feedback and validation and reinforced use of skills Treatment modalities used include Cognitive Behavioral Therapy Interpersonal  Support, Redirection and Feedback    Patient Response:   Patient responded to session by accepting feedback, giving feedback, listening, focusing on goals and accepting support  Possible barriers to participation / learning include: N/A    Current Mental Status Exam:   Appearance:  Appropriate   Eye Contact:  Good   Attitude / Demeanor: Cooperative   Speech      Rate / Production: Normal/ Responsive      Volume:  Normal  volume  Orientation:  All  Mood:   Normal  Affect:   Appropriate   Thought Content: Clear   Insight:   Good       Plan/Need for Future Services:  Return for therapy in 2 weeks to treat diagnosed problems.    Patient has a current master individualized treatment plan.  See Epic treatment plan for more information.    Referral / Collaboration:  Referral to another professional/service is not indicated at this time..  Emergency Services Needed?  No    Assignment:  Plan for client's wife to attend collateral session    Interactive Complexity:  There are four specific communication difficulties that complicate the work of the primary psychiatric procedure.  Interactive complexity (+66587) may be reported when at least one of these difficulties " is present.    Communication difficulties present during current the psychiatric procedure include:  1. None.      Signature/Title:    Mason Adkins, PhD, LP

## 2022-12-05 NOTE — PROCEDURES
I did not personally see the patient. I reviewed and agree with the assessment and plan of this note.       Anusha Hernandez, PhD, LP    Program in Human Sexuality[facility]  Department of Family Medicine and Community Health  [facility]

## 2022-12-08 ENCOUNTER — VIRTUAL VISIT (OUTPATIENT)
Dept: PSYCHOLOGY | Facility: CLINIC | Age: 67
End: 2022-12-08
Payer: COMMERCIAL

## 2022-12-08 DIAGNOSIS — F91.8 CONDUCT DISORDER, UNDIFFERENTIATED TYPE: Primary | ICD-10-CM

## 2022-12-08 DIAGNOSIS — F41.9 ANXIETY DISORDER, UNSPECIFIED TYPE: ICD-10-CM

## 2022-12-08 PROCEDURE — 90853 GROUP PSYCHOTHERAPY: CPT | Mod: GT

## 2022-12-09 NOTE — GROUP NOTE
[facility]  [address], [address]  [place]  [address]    Group Progress Note  [facility] - Progress Note    Date of Service: 22   Name: Ramin Murcia  : 1955  Medical Record Number: [id number]  START TIME:  4:00 PM  END TIME:  6:00 PM  FACILITATOR(S): Mason Adkins, PhD  TOPIC: [facility] Group Therapy  Type of Session: Group  :  Number of Attendees:  7  Number of Minutes:  120    SERVICE MODALITY:  Video Visit:      Provider verified identity through the following two step process.  Patient provided:  Patient is known previously to provider    Telemedicine Visit: The patient's condition can be safely assessed and treated via synchronous audio and visual telemedicine encounter.      Reason for Telemedicine Visit: Services only offered telehealth    Originating Site (Patient Location): Patient's other. Pt later informed the group that he was out of state in [place]. Because client was already participating in group and would have been a disservice to client to ask him to leave, client was allowed to remain group.     Distant Site (Provider Location): Provider Remote Setting- Home Office    Consent:  The patient/guardian has verbally consented to: the potential risks and benefits of telemedicine (video visit) versus in person care; bill my insurance or make self-payment for services provided; and responsibility for payment of non-covered services.     Patient would like the video invitation sent by:  Other e-mail: See chart    Mode of Communication:  Video Conference via Medical Zoom    Distant Location (Provider):  Off-site    As the provider I attest to compliance with applicable laws and regulations related to telemedicine.      DSM-5 Diagnoses:  Encounter Diagnoses   Name Primary?     Other specified disruptive, impulse control, and conduct disorder Yes     Anxiety disorder, unspecified type          Current Reported Symptoms and Status  update:  Changes since last session: no boundary violations, 1-2 drinks per day, urges to violate boundaries.    Patient's problems with out of control, driven, impulsive, compulsive sexual behavior began in early 2020. He has been in a stable loving marriage for 40 years then began having covert hook-ups with young men.  Efforts to change problematic behaviors have included several years of psychotherapy and attenance in [facility]. He continues to struggle with recurrent and intense urges which cause him distress.     Client has been struggling with symptoms of depression and anxiety secondary to halfway and changing roles. These symptoms have impacted his functioning and exacerbate his CSB concerns.    Progress Toward Treatment Goals:   Satisfactory progress     Therapeutic Interventions/Treatment Strategies:    Area(s) of treatment focus addressed in this session included Symptom Management and Interpersonal Relationship Skills    Therapist shared a goodbye letter from former therapist Dr. Bacon. The group processed feedback provided to them, and provided each other supportive feedback. We focused on implementing an interpersonal process to highlight their strengths, progress, and areas of growth. The group also discussed healthy decision making, and how clients can use coping strategies and progress toward positive core beliefs to inform decisions around what is and is not healthy for them. Ramin shared feedback with others and was receptive to the group's input to him.  Psychotherapist offered support, feedback and validation and reinforced use of skills Treatment modalities used include [facility] THERAPY INTERVENTIONS: Cognitive Behavioral Therapy Interpersonal  Support, Redirection and Feedback    Patient Response:   Patient responded to session by accepting feedback, giving feedback, listening, focusing on goals and accepting support  Possible barriers to participation / learning include: N/A    Current Mental  Status Exam:   Appearance:  Appropriate   Eye Contact:  Good   Attitude / Demeanor: Cooperative   Speech      Rate / Production: Normal/ Responsive      Volume:  Normal  volume  Orientation:  All  Mood:   Normal  Affect:   Appropriate   Thought Content: Clear   Insight:   Good       Plan/Need for Future Services:  Return for therapy in 1 week to treat diagnosed problems.    Patient has a current master individualized treatment plan.  See Epic treatment plan for more information.    Referral / Collaboration:  Referral to another professional/service is not indicated at this time..  Emergency Services Needed?  No    Assignment:  Continue antecedents/history    Interactive Complexity:  There are four specific communication difficulties that complicate the work of the primary psychiatric procedure.  Interactive complexity (+09522) may be reported when at least one of these difficulties is present.    Communication difficulties present during current the psychiatric procedure include:  1. None.      Signature/Title:          Mason Adkins, PhD, LP    [place] [facility], [facility]  Department of Family Medicine and Community Health  [employer] [employer] [facility] [employer]

## 2022-12-14 ENCOUNTER — VIRTUAL VISIT (OUTPATIENT)
Dept: PSYCHIATRY | Facility: CLINIC | Age: 67
End: 2022-12-14
Payer: COMMERCIAL

## 2022-12-14 ENCOUNTER — MYC MEDICAL ADVICE (OUTPATIENT)
Dept: PSYCHIATRY | Facility: CLINIC | Age: 67
End: 2022-12-14

## 2022-12-14 DIAGNOSIS — Z53.9 ERRONEOUS ENCOUNTER--DISREGARD: Primary | ICD-10-CM

## 2022-12-15 ENCOUNTER — VIRTUAL VISIT (OUTPATIENT)
Dept: PSYCHOLOGY | Facility: CLINIC | Age: 67
End: 2022-12-15
Payer: COMMERCIAL

## 2022-12-15 DIAGNOSIS — F91.8 CONDUCT DISORDER, UNDIFFERENTIATED TYPE: Primary | ICD-10-CM

## 2022-12-15 DIAGNOSIS — F43.23 ADJUSTMENT DISORDER WITH MIXED ANXIETY AND DEPRESSED MOOD: ICD-10-CM

## 2022-12-15 PROCEDURE — 90853 GROUP PSYCHOTHERAPY: CPT | Mod: 95

## 2022-12-15 PROCEDURE — 90837 PSYTX W PT 60 MINUTES: CPT | Mod: 95 | Performed by: STUDENT IN AN ORGANIZED HEALTH CARE EDUCATION/TRAINING PROGRAM

## 2022-12-15 NOTE — PROGRESS NOTES
[place] [facility] - Progress Note    Date of Service: 12/15/22   Name: Ramin Murcia  : 1955  Medical Record Number: [id number]  Treating Provider:       Mason Adkins, Ph.D  Type of Session: Individual  Present in Session: Client and therapist  Session Start and Stop Time: 9:00am-9:53am  Number of Minutes:  53    SERVICE MODALITY:  Video Visit:      Provider verified identity through the following two step process.  Patient provided:  Patient is known previously to provider    Telemedicine Visit: The patient's condition can be safely assessed and treated via synchronous audio and visual telemedicine encounter.      Reason for Telemedicine Visit: Services only offered telehealth    Originating Site (Patient Location): Patient's home    Distant Site (Provider Location): [facility]    Consent:  The patient/guardian has verbally consented to: the potential risks and benefits of telemedicine (video visit) versus in person care; bill my insurance or make self-payment for services provided; and responsibility for payment of non-covered services.     Patient would like the video invitation sent by:  [employer]    Mode of Communication:  Video Conference via [employer]    Distant Location (Provider):  On-site    As the provider I attest to compliance with applicable laws and regulations related to telemedicine.    DSM-5 Diagnoses:  Encounter Diagnoses   Name Primary?     Other specified disruptive, impulse control, and conduct disorder Yes     Adjustment disorder with mixed anxiety and depressed mood      Current Reported Symptoms and Status update:  Changes since last session include increased urges and arousal, difficulties with orgasm secondary to Prozac, and staying within boundaries.    Patient's problems with out of control, driven, impulsive, compulsive sexual behavior began in early . He has been in a stable loving marriage for 40 years then began  having covert hook-ups with young men.  Efforts to change problematic behaviors have included several years of psychotherapy and attenance in [facility]. He continues to struggle with recurrent and intense urges which cause him distress.     Client has been struggling with symptoms of depression and anxiety secondary to half-way and changing roles. These symptoms have impacted his functioning and exacerbate his CSB concerns.      Progress Toward Treatment Goals:   Satisfactory progress      Transferred care from Dr. Bacon to Dr. Adkins 11/10/22  Working to understand antecedents, risks, and urges  Working to understand negative cycles    Therapeutic Interventions/Treatment Strategies:    Area(s) of treatment focus addressed in this session included Symptom Management, Interpersonal Relationship Skills and Sexual Health and Wellness    Client processed recent experiences of increased urges and thoughts related to acting out. He identified risk factors and how these fit into his negative cycle. We practiced identifying thoughts, feelings, situations, risk factors for CSB using a CBT-based model. Client shared how he is continuing to understand the impact of his need for control and independence, particularly how it relates to how he has acted out in the past and his negative cycles. He also processed his pattern of impression management. He processed disappointment he feels for his past behavior and worked through shame. In session, client mentioned his age several times; client and therapist explored his perceptions of his age. He processed feeling a fear of aging.     Client and therapist reviewed privacy practices related to email and contacting therapist. We also reviewed not being able to attend sessions/groups if out of the state of [place].    Psychotherapist offered support, feedback and validation and reinforced use of skills Treatment modalities used include Cognitive Behavioral Therapy Interpersonal  Support,  Redirection and Feedback    Patient Response:   Patient responded to session by accepting feedback, giving feedback, listening, focusing on goals and accepting support  Possible barriers to participation / learning include: N/A    Current Mental Status Exam:   Appearance:  Appropriate   Eye Contact:  Good   Attitude / Demeanor: Cooperative   Speech      Rate / Production: Normal/ Responsive      Volume:  Normal  volume  Orientation:  All  Mood:   Normal  Affect:   Appropriate   Thought Content: Clear   Insight:   Good       Plan/Need for Future Services:  Return for therapy in 2 weeks to treat diagnosed problems.    Patient has a current master individualized treatment plan.  See Epic treatment plan for more information.    Referral / Collaboration:  Referral to another professional/service is not indicated at this time..  Emergency Services Needed?  No    Assignment:  Start identifying negative cycles    Interactive Complexity:  There are four specific communication difficulties that complicate the work of the primary psychiatric procedure.  Interactive complexity (+04871) may be reported when at least one of these difficulties is present.    Communication difficulties present during current the psychiatric procedure include:  1. None.      Signature/Title:         Mason Adkins, PhD, LP    [place] for Sexual [facility], Sexual and Gender Health Clinic  Department of Family Medicine and Community Health  [facility] [employer]

## 2022-12-15 NOTE — GROUP NOTE
[facility]  [address], [address]  [place]  [address]    Group Progress Note  [facility] - Progress Note    Date of Service: 12/15/22   Name: Ramin Murcia  : 1955  Medical Record Number: [id number]  START TIME:  4:00 PM  END TIME:  6:00 PM  FACILITATOR(S): Mason Adkins, PhD  TOPIC: SG[facility] Group Therapy  Type of Session: Group  :  Number of Attendees:  8  Number of Minutes:  120    SERVICE MODALITY:  Video Visit:      Provider verified identity through the following two step process.  Patient provided:  Patient is known previously to provider    Telemedicine Visit: The patient's condition can be safely assessed and treated via synchronous audio and visual telemedicine encounter.      Reason for Telemedicine Visit: Services only offered telehealth    Originating Site (Patient Location): Patient's home    Distant Site (Provider Location): [facility] Clinics: Sexual and Gender Health Clinic    Consent:  The patient/guardian has verbally consented to: the potential risks and benefits of telemedicine (video visit) versus in person care; bill my insurance or make self-payment for services provided; and responsibility for payment of non-covered services.     Patient would like the video invitation sent by:  Other e-mail: see chart    Mode of Communication:  Video Conference via Medical Zoom    Distant Location (Provider):  On-site    As the provider I attest to compliance with applicable laws and regulations related to telemedicine.      DSM-5 Diagnoses:  Encounter Diagnoses   Name Primary?     Other specified disruptive, impulse control, and conduct disorder Yes     Adjustment disorder with mixed anxiety and depressed mood      Current Reported Symptoms and Status update:  Changes since last session include increased urges to violate boundaries but no boundary violations.    Patient's problems with out of control, driven, impulsive, compulsive  sexual behavior began in early 2020. He has been in a stable loving marriage for 40 years then began having covert hook-ups with young men.  Efforts to change problematic behaviors have included several years of psychotherapy and attenance in [facility]. He continues to struggle with recurrent and intense urges which cause him distress.     Client has been struggling with symptoms of depression and anxiety secondary to penitentiary and changing roles. These symptoms have impacted his functioning and exacerbate his CSB concerns.      Progress Toward Treatment Goals:   Satisfactory progress     Therapeutic Interventions/Treatment Strategies:    Area(s) of treatment focus addressed in this session included Symptom Management, Interpersonal Relationship Skills and Sexual Health and Wellness    The group introduced themselves and treatment to medical student Stephanie Ricks. Client shared that he has had increased sexual frustration lately. He shared risk situations he is noticing for boundary violations, including being alone and having increased arousal. He processed emotional reactions to risk situations and his fear of boundary violations. He also reviewed progress in his treatment. He was an active group participant by providing feedback to others and responded to feedback provided to him.    Psychotherapist offered support, feedback and validation and reinforced use of skills Treatment modalities used include [facility] THERAPY INTERVENTIONS: Cognitive Behavioral Therapy Interpersonal  Support, Redirection and Feedback    Patient Response:   Patient responded to session by accepting feedback, giving feedback, listening, focusing on goals and accepting support  Possible barriers to participation / learning include: N/A    Current Mental Status Exam:   Appearance:  Appropriate   Eye Contact:  Good   Attitude / Demeanor: Cooperative   Speech      Rate / Production: Normal/ Responsive      Volume:  Normal   volume  Orientation:  All  Mood:   Normal  Affect:   Appropriate   Thought Content: Clear   Insight:   Good       Plan/Need for Future Services:  Return for therapy in 1 week to treat diagnosed problems.    Patient has a current master individualized treatment plan.  See Epic treatment plan for more information.    Referral / Collaboration:  Referral to another professional/service is not indicated at this time..  Emergency Services Needed?  No    Assignment:  Practice identifying factors associated with negative cycle.    Interactive Complexity:  There are four specific communication difficulties that complicate the work of the primary psychiatric procedure.  Interactive complexity (+16057) may be reported when at least one of these difficulties is present.    Communication difficulties present during current the psychiatric procedure include:  1. None.      Signature/Title:      Mason Adkins, PhD, LP    [place] for Sexual [facility] Gender [facility], Sexual and Gender Health Clinic  Department of Family Medicine and Community Health  [employer]

## 2022-12-27 DIAGNOSIS — F91.8 CONDUCT DISORDER, UNDIFFERENTIATED TYPE: ICD-10-CM

## 2022-12-27 RX ORDER — FLUOXETINE 10 MG/1
CAPSULE ORAL
Qty: 60 CAPSULE | Refills: 0 | Status: CANCELLED | OUTPATIENT
Start: 2022-12-27

## 2022-12-27 NOTE — TELEPHONE ENCOUNTER
Requested Medication: fluoxetine (Prozac) 10 mg capsule  Dose: 20 mg    Quantity: 60 capsules     Refills: 0    Take 2 capsules (20 mg ) daily.     Last seen at [facility]: 12/14/2022    Next Appointment with Provider:  Visit date not found    AMPARO Frazier

## 2022-12-29 ENCOUNTER — MYC REFILL (OUTPATIENT)
Dept: PSYCHIATRY | Facility: CLINIC | Age: 67
End: 2022-12-29

## 2022-12-29 DIAGNOSIS — F91.8 CONDUCT DISORDER, UNDIFFERENTIATED TYPE: ICD-10-CM

## 2022-12-29 RX ORDER — FLUOXETINE 10 MG/1
CAPSULE ORAL
Qty: 60 CAPSULE | Refills: 0 | Status: SHIPPED | OUTPATIENT
Start: 2022-12-29 | End: 2023-02-06

## 2022-12-29 NOTE — CONFIDENTIAL NOTE
Requested Medication: FLUoxetine 10mg  Dose: 20mg  Quantity: 60  Refills: 0    Take 2 (10mg) capsules daily    Last seen at [facility]: 9/8 - 3 mo  Next Appointment with Provider: 3/2/2023      Dior Mike CMA

## 2023-01-05 ENCOUNTER — VIRTUAL VISIT (OUTPATIENT)
Dept: PSYCHOLOGY | Facility: CLINIC | Age: 68
End: 2023-01-05
Payer: COMMERCIAL

## 2023-01-05 DIAGNOSIS — F43.23 ADJUSTMENT DISORDER WITH MIXED ANXIETY AND DEPRESSED MOOD: ICD-10-CM

## 2023-01-05 DIAGNOSIS — F91.8 CONDUCT DISORDER, UNDIFFERENTIATED TYPE: Primary | ICD-10-CM

## 2023-01-05 DIAGNOSIS — F41.9 ANXIETY DISORDER, UNSPECIFIED TYPE: ICD-10-CM

## 2023-01-05 PROCEDURE — 90853 GROUP PSYCHOTHERAPY: CPT | Mod: 95

## 2023-01-05 NOTE — GROUP NOTE
[facility]  [address], [address]  [place]  [address]    Group Progress Note  [facility] - Progress Note    Date of Service: 23   Name: Ramin Murcia  : 1955  Medical Record Number: [id number]  START TIME:  4:00 PM  END TIME:  6:00 PM  FACILITATOR(S): Mason Adkins, PhD  TOPIC: [facility] Group Therapy  Type of Session: Group  :  Number of Attendees:  8  Number of Minutes:  120    SERVICE MODALITY:  Video Visit:      Provider verified identity through the following two step process.  Patient provided:  Patient is known previously to provider    Telemedicine Visit: The patient's condition can be safely assessed and treated via synchronous audio and visual telemedicine encounter.      Reason for Telemedicine Visit: Services only offered telehealth    Originating Site (Patient Location): Patient's home    Distant Site (Provider Location): [facility] Clinics: Sexual and Gender Health Clinic    Consent:  The patient/guardian has verbally consented to: the potential risks and benefits of telemedicine (video visit) versus in person care; bill my insurance or make self-payment for services provided; and responsibility for payment of non-covered services.     Patient would like the video invitation sent by:  My Chart    Mode of Communication:  Video Conference via [employer]    Distant Location (Provider):  On-site    As the provider I attest to compliance with applicable laws and regulations related to telemedicine.      DSM-5 Diagnoses:  Encounter Diagnoses   Name Primary?     Adjustment disorder with mixed anxiety and depressed mood      Anxiety disorder, unspecified type      Other specified disruptive, impulse control, and conduct disorder (hypersexual disorder) Yes         Current Reported Symptoms and Status update:  Changes since last session include feeling sexually compulsive, having some body image concerns, no boundary violations, but  increased urges.    Patient's problems with out of control, driven, impulsive, compulsive sexual behavior began in early 2020. He has been in a stable loving marriage for 40 years then began having covert hook-ups with young men.  Efforts to change problematic behaviors have included several years of psychotherapy and attenance in [facility]. He continues to struggle with recurrent and intense urges which cause him distress.     Client has been struggling with symptoms of depression and anxiety secondary to penitentiary and changing roles. These symptoms have impacted his functioning and exacerbate his CSB concerns.      Progress Toward Treatment Goals:   Satisfactory progress     Therapeutic Interventions/Treatment Strategies:    Area(s) of treatment focus addressed in this session included Symptom Management and Interpersonal Relationship Skills    Client shared that he has been noticing increased urges and feels uncomfortable with his urges. He expressed wishing that his risk management was more on autopilot. The group supported client to more clearly identify triggers, thoughts, and feelings associated with urges. He noticed a tendency toward sensation seeking and missing the thrill of acting out. The group connected with client and shared similar experiences. Ramin participated in the group session and provided supportive input to others.     Psychotherapist offered support, feedback and validation and reinforced use of skills Treatment modalities used include [facility] THERAPY INTERVENTIONS: Cognitive Behavioral Therapy Interpersonal  Support, Redirection and Feedback    Patient Response:   Patient responded to session by accepting feedback, giving feedback, listening, focusing on goals and accepting support  Possible barriers to participation / learning include: N/A    Current Mental Status Exam:   Appearance:  Appropriate   Eye Contact:  Good   Attitude / Demeanor: Cooperative   Speech      Rate / Production: Normal/  Responsive      Volume:  Normal  volume  Orientation:  All  Mood:   Normal  Affect:   Appropriate   Thought Content: Clear   Insight:   Good       Plan/Need for Future Services:  Return for therapy in 1 week to treat diagnosed problems.    Patient has a current master individualized treatment plan.  See Epic treatment plan for more information.    Referral / Collaboration:  Referral to another professional/service is not indicated at this time..  Emergency Services Needed?  No    Assignment:  Functional analysis of urges.    Interactive Complexity:  There are four specific communication difficulties that complicate the work of the primary psychiatric procedure.  Interactive complexity (+48940) may be reported when at least one of these difficulties is present.    Communication difficulties present during current the psychiatric procedure include:  1. None.      Signature/Title:    Mason Adkins, PhD, LP    [place] for Sexual and Gender Health, Sexual and Gender Health Clinic  Department of Family Medicine and Community Health  [employer] [facility] [employer]

## 2023-01-12 ENCOUNTER — VIRTUAL VISIT (OUTPATIENT)
Dept: PSYCHOLOGY | Facility: CLINIC | Age: 68
End: 2023-01-12
Payer: COMMERCIAL

## 2023-01-12 ENCOUNTER — OFFICE VISIT (OUTPATIENT)
Dept: PSYCHOLOGY | Facility: CLINIC | Age: 68
End: 2023-01-12
Payer: COMMERCIAL

## 2023-01-12 DIAGNOSIS — F43.23 ADJUSTMENT DISORDER WITH MIXED ANXIETY AND DEPRESSED MOOD: ICD-10-CM

## 2023-01-12 DIAGNOSIS — F91.8 CONDUCT DISORDER, UNDIFFERENTIATED TYPE: Primary | ICD-10-CM

## 2023-01-12 DIAGNOSIS — F41.9 ANXIETY DISORDER, UNSPECIFIED TYPE: ICD-10-CM

## 2023-01-12 PROCEDURE — 90837 PSYTX W PT 60 MINUTES: CPT | Mod: 59 | Performed by: STUDENT IN AN ORGANIZED HEALTH CARE EDUCATION/TRAINING PROGRAM

## 2023-01-12 PROCEDURE — 90853 GROUP PSYCHOTHERAPY: CPT | Mod: 95

## 2023-01-12 NOTE — PROGRESS NOTES
[place] [facility] - Progress Note    Date of Service: 23   Name: Ramin Murcia  : 1955  Medical Record Number: [id number]  Treating Provider: Mason Adkins, PhD  Type of Session: Individual  Present in Session: Client, client's wife Elizabeth, and therapist  Session Start and Stop Time: 1:00pm-1:55pm  Number of Minutes:  55    SERVICE MODALITY:  In-person    DSM-5 Diagnoses:  Encounter Diagnoses   Name Primary?     Other specified disruptive, impulse control, and conduct disorder (hypersexual disorder) Yes     Anxiety disorder, unspecified type      Adjustment disorder with mixed anxiety and depressed mood      Current Reported Symptoms and Status update:  Changes since last session include ongoing concerns about anxiety, pornography use and alcohol use.    Patient's problems with out of control, driven, impulsive, compulsive sexual behavior began in early . He has been in a stable loving marriage for 40 years then began having covert hook-ups with young men.  Efforts to change problematic behaviors have included several years of psychotherapy and attenance in [facility]. He continues to struggle with recurrent and intense urges which cause him distress.     Client has been struggling with symptoms of depression and anxiety secondary to half-way and changing roles. These symptoms have impacted his functioning and exacerbate his CSB concerns.      Progress Toward Treatment Goals:   Satisfactory progress     Transferred care from Dr. Bacon to Dr. Adkins 11/10/22  Working to understand antecedents, risks, and urges  Working to understand negative cycles  Collateral session with client's wife, Elizabeth 23    Therapeutic Interventions/Treatment Strategies:    Area(s) of treatment focus addressed in this session included Symptom Management and Interpersonal Relationship Skills    The focus of today's session was a collateral session with client's wife, Elizabeth. Therapist,  client, and Elizabeth reviewed the collateral information form and Elizabeth signed the form. Reviewed limitations to confidentiality, documentation, and billing under client's name. Elizabeth shared her experience of client's compulsive sexual behavior and client's treatment. She explained her discovery of client's use of porn, his sexual encounters with other men, and her feelings around pornography in general. She shared that she has seen client work to be more honest and she feels she is becoming less triggered. She has her own therapist (Shanda) and believes client is making progress in treatment. Client reflected on his progress and provided input to his wife's contributions. At one point he was observed to repeat his explanation of why he acted out with men (visual representation of him being younger), and his wife noted we have heard this explanation many times. Client and Elizabeth agreed to have Elizabeth join future sessions as-needed. Both expressed gratitude for the session.    Psychotherapist offered support, feedback and validation and reinforced use of skills  Support, Redirection and Feedback    Patient Response:   Patient responded to session by accepting feedback, giving feedback, listening, focusing on goals and accepting support  Possible barriers to participation / learning include: N/A    Current Mental Status Exam:   Appearance:  Appropriate   Eye Contact:  Good   Attitude / Demeanor: Cooperative   Speech      Rate / Production: Normal/ Responsive      Volume:  Normal  volume  Orientation:  All  Mood:   Normal  Affect:   Appropriate   Thought Content: Clear   Insight:   Good       Plan/Need for Future Services:  Return for therapy in 2 weeks to treat diagnosed problems.    Patient has a current master individualized treatment plan.  See Epic treatment plan for more information.    Referral / Collaboration:  Referral to another professional/service is not indicated at this time..  Emergency Services  Needed?  No    Assignment:  None assigned    Interactive Complexity:  There are four specific communication difficulties that complicate the work of the primary psychiatric procedure.  Interactive complexity (+44850) may be reported when at least one of these difficulties is present.    Communication difficulties present during current the psychiatric procedure include:  1. None.      Signature/Title:    Mason Adkins, PhD, LP    [place] [facility], Sexual and Gender Health Clinic  Department of Family Medicine and Community Health  [facility]

## 2023-01-12 NOTE — GROUP NOTE
Gender and Sexual Health Clinic  [address], [address]  [place]  [address]    Group Progress Note  [facility] Clinic - Progress Note    Date of Service: 23   Name: Ramin Murcia  : 1955  Medical Record Number: [id number]  START TIME:  4:00 PM  END TIME:  6:00 PM  FACILITATOR(S): Mason Adkins, PhD  TOPIC: SG[facility] Group Therapy  Type of Session: Group  :  Number of Attendees:  6  Number of Minutes:  120    SERVICE MODALITY:  Video Visit:      Provider verified identity through the following two step process.  Patient provided:  Patient is known previously to provider    Telemedicine Visit: The patient's condition can be safely assessed and treated via synchronous audio and visual telemedicine encounter.      Reason for Telemedicine Visit: Services only offered telehealth    Originating Site (Patient Location): Patient's home    Distant Site (Provider Location): [facility] Clinics: Sexual and Gender Health Clinic    Consent:  The patient/guardian has verbally consented to: the potential risks and benefits of telemedicine (video visit) versus in person care; bill my insurance or make self-payment for services provided; and responsibility for payment of non-covered services.     Patient would like the video invitation sent by:  Send to e-mail at: No e-mail address on record    Mode of Communication:  Video Conference via Medical Zoom    Distant Location (Provider):  On-site    As the provider I attest to compliance with applicable laws and regulations related to telemedicine.      DSM-5 Diagnoses:  Encounter Diagnoses   Name Primary?     Other specified disruptive, impulse control, and conduct disorder (hypersexual disorder) Yes     Anxiety disorder, unspecified type      Adjustment disorder with mixed anxiety and depressed mood      Current Reported Symptoms and Status update:  Changes since last session include increased socializing and mindfulness; drinking 2 drinks  daily; no risk situations and no boundary violations.    Patient's problems with out of control, driven, impulsive, compulsive sexual behavior began in early 2020. He has been in a stable loving marriage for 40 years then began having covert hook-ups with young men.  Efforts to change problematic behaviors have included several years of psychotherapy and attenance in [facility]. He continues to struggle with recurrent and intense urges which cause him distress.     Client has been struggling with symptoms of depression and anxiety secondary to shelter and changing roles. These symptoms have impacted his functioning and exacerbate his CSB concerns.    Progress Toward Treatment Goals:   Satisfactory progress     Therapeutic Interventions/Treatment Strategies:    Area(s) of treatment focus addressed in this session included Symptom Management, Interpersonal Relationship Skills and Sexual Health and Wellness    Client reflected on his collateral session with his wife earlier today, including that his wife is happy with the treatment process so far. Client reflected on his acceptance of longer-term treatment and recovery work in CSB treatment. The group therapy process facilitated opportunities for connection, authenticity, and reduced impression management. Client provided feedback to group members and was responsive to feedback from group members.     Psychotherapist offered support, feedback and validation and reinforced use of skills Treatment modalities used include [facility] THERAPY INTERVENTIONS: Cognitive Behavioral Therapy Interpersonal  Support, Redirection and Feedback    Patient Response:   Patient responded to session by accepting feedback, giving feedback, listening, focusing on goals and accepting support  Possible barriers to participation / learning include: N/A    Current Mental Status Exam:   Appearance:  Appropriate   Eye Contact:  Good   Attitude / Demeanor: Cooperative   Speech      Rate / Production: Normal/  Responsive      Volume:  Normal  volume  Orientation:  All  Mood:   Normal  Affect:   Appropriate   Thought Content: Clear   Insight:   Good       Plan/Need for Future Services:  Return for therapy in 1 week to treat diagnosed problems.    Patient has a current master individualized treatment plan.  See Epic treatment plan for more information.    Referral / Collaboration:  Referral to another professional/service is not indicated at this time..  Emergency Services Needed?  No    Assignment:  Plan for group member termination next session.    Interactive Complexity:  There are four specific communication difficulties that complicate the work of the primary psychiatric procedure.  Interactive complexity (+00943) may be reported when at least one of these difficulties is present.    Communication difficulties present during current the psychiatric procedure include:  1. None.      Signature/Title:    Mason Adkins, PhD, LP    [place] [facility], Sexual and Gender Health Clinic  Department of Family Medicine and Community Health  [facility]

## 2023-01-19 ENCOUNTER — VIRTUAL VISIT (OUTPATIENT)
Dept: PSYCHOLOGY | Facility: CLINIC | Age: 68
End: 2023-01-19
Payer: COMMERCIAL

## 2023-01-19 DIAGNOSIS — F91.8 CONDUCT DISORDER, UNDIFFERENTIATED TYPE: Primary | ICD-10-CM

## 2023-01-19 DIAGNOSIS — F41.9 ANXIETY DISORDER, UNSPECIFIED TYPE: ICD-10-CM

## 2023-01-19 DIAGNOSIS — F43.23 ADJUSTMENT DISORDER WITH MIXED ANXIETY AND DEPRESSED MOOD: ICD-10-CM

## 2023-01-19 PROCEDURE — 90853 GROUP PSYCHOTHERAPY: CPT | Mod: 95

## 2023-01-20 NOTE — GROUP NOTE
Gender and Sexual Health Clinic  [address], [address]  [place]  [address]    Group Progress Note  Gender and Sexual Health Clinic - Progress Note    Date of Service: 23   Name: Ramin Murcia  : 1955  Medical Record Number: [id number]  START TIME:  4:00 PM  END TIME:  5:30 PM  FACILITATOR(S): Mason Adkins, PhD  TOPIC: [facility] Group Therapy  Type of Session: Group  :  Number of Attendees:  8  Number of Minutes: 90    SERVICE MODALITY:  Video Visit:      Provider verified identity through the following two step process.  Patient provided:  Patient is known previously to provider    Telemedicine Visit: The patient's condition can be safely assessed and treated via synchronous audio and visual telemedicine encounter.      Reason for Telemedicine Visit: Services only offered telehealth    Originating Site (Patient Location): Patient's home    Distant Site (Provider Location): [facility] Clinics: Sexual and Gender Health Clinic    Consent:  The patient/guardian has verbally consented to: the potential risks and benefits of telemedicine (video visit) versus in person care; bill my insurance or make self-payment for services provided; and responsibility for payment of non-covered services.     Patient would like the video invitation sent by:  My Chart    Mode of Communication:  Video Conference via Amwell    Distant Location (Provider):  On-site    As the provider I attest to compliance with applicable laws and regulations related to telemedicine.      DSM-5 Diagnoses:  Encounter Diagnoses   Name Primary?     Other specified disruptive, impulse control, and conduct disorder (hypersexual disorder) Yes     Anxiety disorder, unspecified type      Adjustment disorder with mixed anxiety and depressed mood          Current Reported Symptoms and Status update:  Changes since last session include continued mindfulness practice, strong urges to violate boundaries, and no boundary  violations.    Patient's problems with out of control, driven, impulsive, compulsive sexual behavior began in early 2020. He has been in a stable loving marriage for 40 years then began having covert hook-ups with young men.  Efforts to change problematic behaviors have included several years of psychotherapy and attenance in [facility]. He continues to struggle with recurrent and intense urges which cause him distress.     Client has been struggling with symptoms of depression and anxiety secondary to group home and changing roles. These symptoms have impacted his functioning and exacerbate his CSB concerns.      Progress Toward Treatment Goals:   Satisfactory progress     Therapeutic Interventions/Treatment Strategies:    Area(s) of treatment focus addressed in this session included Interpersonal Relationship Skills    An interpersonal and CBT approach was implemented to facilitate the group process. Group members provided feedback and reflections to a group member who was ending group therapy today. Group members practiced expressing emotions effectively. Group members reflected on their own treatment progress and ongoing treatment goals. Ramin was responsive to feedback provided to him and reflected on how the group has supported his treatment progress.  Psychotherapist offered support, feedback and validation and reinforced use of skills Treatment modalities used include [facility] THERAPY INTERVENTIONS: Cognitive Behavioral Therapy Interpersonal  Support, Redirection and Feedback    Patient Response:   Patient responded to session by accepting feedback, giving feedback, listening, focusing on goals and accepting support  Possible barriers to participation / learning include: N/A    Current Mental Status Exam:   Appearance:  Appropriate   Eye Contact:  Good   Attitude / Demeanor: Cooperative   Speech      Rate / Production: Normal/ Responsive      Volume:  Normal   volume  Orientation:  All  Mood:   Normal  Affect:   Appropriate   Thought Content: Clear   Insight:   Good       Plan/Need for Future Services:  Return for therapy in 1 week to treat diagnosed problems.    Patient has a current master individualized treatment plan.  See Epic treatment plan for more information.    Referral / Collaboration:  Referral to another professional/service is not indicated at this time..  Emergency Services Needed?  No    Assignment:  None assigned    Interactive Complexity:  There are four specific communication difficulties that complicate the work of the primary psychiatric procedure.  Interactive complexity (+99522) may be reported when at least one of these difficulties is present.    Communication difficulties present during current the psychiatric procedure include:  1. None.      Signature/Title:      Mason Adkins, PhD, LP    [place] for Sexual and Gender Health, Sexual and Gender Health Clinic  Department of Family Medicine and Community Health  [facility]

## 2023-01-26 ENCOUNTER — VIRTUAL VISIT (OUTPATIENT)
Dept: PSYCHOLOGY | Facility: CLINIC | Age: 68
End: 2023-01-26
Payer: COMMERCIAL

## 2023-01-26 DIAGNOSIS — F43.23 ADJUSTMENT DISORDER WITH MIXED ANXIETY AND DEPRESSED MOOD: ICD-10-CM

## 2023-01-26 DIAGNOSIS — F91.8 CONDUCT DISORDER, UNDIFFERENTIATED TYPE: Primary | ICD-10-CM

## 2023-01-26 DIAGNOSIS — F41.9 ANXIETY DISORDER, UNSPECIFIED TYPE: ICD-10-CM

## 2023-01-26 PROCEDURE — 90853 GROUP PSYCHOTHERAPY: CPT | Mod: 95

## 2023-01-26 NOTE — GROUP NOTE
Gender and Sexual Health Clinic  [address], [address]  [place]  [address]    Group Progress Note  Gender and Sexual Health Clinic - Progress Note    Date of Service: 23   Name: Ramin Murcia  : 1955  Medical Record Number: [id number]  START TIME:  4:00 PM  END TIME:  5:55 PM  FACILITATOR(S): Mason Adkins, PhD  TOPIC: [facility] Group Therapy  Type of Session: Group  :  Number of Attendees:  5  Number of Minutes:  115    SERVICE MODALITY:  Video Visit:      Provider verified identity through the following two step process.  Patient provided:  Patient is known previously to provider    Telemedicine Visit: The patient's condition can be safely assessed and treated via synchronous audio and visual telemedicine encounter.      Reason for Telemedicine Visit: Services only offered telehealth    Originating Site (Patient Location): Patient's home    Distant Site (Provider Location): [facility] Clinics: Sexual and Gender Health Clinic    Consent:  The patient/guardian has verbally consented to: the potential risks and benefits of telemedicine (video visit) versus in person care; bill my insurance or make self-payment for services provided; and responsibility for payment of non-covered services.     Patient would like the video invitation sent by:  Other e-mail: see chart    Mode of Communication:  Video Conference via Medical Zoom    Distant Location (Provider):  On-site    As the provider I attest to compliance with applicable laws and regulations related to telemedicine.      DSM-5 Diagnoses:  Encounter Diagnoses   Name Primary?     Other specified disruptive, impulse control, and conduct disorder (hypersexual disorder) Yes     Anxiety disorder, unspecified type      Adjustment disorder with mixed anxiety and depressed mood          Current Reported Symptoms and Status update:  Changes since last session include urges to violate boundaries, some boundary violations, and some  anxiety.    Patient's problems with out of control, driven, impulsive, compulsive sexual behavior began in early 2020. He has been in a stable loving marriage for 40 years then began having covert hook-ups with young men.  Efforts to change problematic behaviors have included several years of psychotherapy and attenance in [facility]. He continues to struggle with recurrent and intense urges which cause him distress.     Client has been struggling with symptoms of depression and anxiety secondary to assisted and changing roles. These symptoms have impacted his functioning and exacerbate his CSB concerns.      Progress Toward Treatment Goals:   Satisfactory progress     Therapeutic Interventions/Treatment Strategies:    Area(s) of treatment focus addressed in this session included Symptom Management and Interpersonal Relationship Skills    The group discussed antecedents to negative core beliefs, sexual histories, and effectively addressing CSB related concerns. Client shared recent experiences of strong urges and near boundary violations. He identified factors potentially contributing to these urges. Client was supported to conceptualize his negative cycle by the group members. Client provided feedback to the group and was responsive to feedback from the group.  Psychotherapist offered support, feedback and validation and reinforced use of skills Treatment modalities used include [facility] THERAPY INTERVENTIONS: Cognitive Behavioral Therapy Interpersonal  Support, Redirection and Feedback    Patient Response:   Patient responded to session by accepting feedback, giving feedback, listening, focusing on goals and accepting support  Possible barriers to participation / learning include: N/A    Current Mental Status Exam:   Appearance:  Appropriate   Eye Contact:  Good   Attitude / Demeanor: Cooperative   Speech      Rate / Production: Normal/ Responsive      Volume:  Normal   volume  Orientation:  All  Mood:   Normal  Affect:   Appropriate   Thought Content: Clear   Insight:   Good       Plan/Need for Future Services:  Return for therapy in 1 week to treat diagnosed problems.    Patient has a current master individualized treatment plan.  See Epic treatment plan for more information.    Referral / Collaboration:  Referral to another professional/service is not indicated at this time..  Emergency Services Needed?  No    Assignment:  Work on negative cycle    Interactive Complexity:  There are four specific communication difficulties that complicate the work of the primary psychiatric procedure.  Interactive complexity (+65999) may be reported when at least one of these difficulties is present.    Communication difficulties present during current the psychiatric procedure include:  1. None.      Signature/Title:    Mason Adkins, PhD, LP    [place] for Sexual and Gender Health, Sexual and Gender Health Clinic  Department of Family Medicine and Community Health  [facility]

## 2023-01-30 ENCOUNTER — OFFICE VISIT (OUTPATIENT)
Dept: PSYCHOLOGY | Facility: CLINIC | Age: 68
End: 2023-01-30
Payer: COMMERCIAL

## 2023-01-30 DIAGNOSIS — F41.9 ANXIETY DISORDER, UNSPECIFIED TYPE: ICD-10-CM

## 2023-01-30 DIAGNOSIS — F91.8 CONDUCT DISORDER, UNDIFFERENTIATED TYPE: Primary | ICD-10-CM

## 2023-01-30 DIAGNOSIS — F43.23 ADJUSTMENT DISORDER WITH MIXED ANXIETY AND DEPRESSED MOOD: ICD-10-CM

## 2023-01-30 PROCEDURE — 90834 PSYTX W PT 45 MINUTES: CPT | Performed by: STUDENT IN AN ORGANIZED HEALTH CARE EDUCATION/TRAINING PROGRAM

## 2023-01-30 NOTE — PROGRESS NOTES
[place] for Sexual and Gender Health - Progress Note    Date of Service: 23   Name: Ramin Murcia  : 1955  Medical Record Number: [id number]  Treating Provider: Mason Adkins, PhD  Type of Session: Individual  Present in Session: Client and therapist  Session Start and Stop Time: 9:00am-9:50am  Number of Minutes:  50    SERVICE MODALITY:  In-person    DSM-5 Diagnoses:  Encounter Diagnoses   Name Primary?     Other specified disruptive, impulse control, and conduct disorder (hypersexual disorder) Yes     Anxiety disorder, unspecified type      Adjustment disorder with mixed anxiety and depressed mood      Current Reported Symptoms and Status update:  Changes since last session include recent boundary violation, working to rebuild trust with his wife, some anxiety around relationship/sexuality.    Patient's problems with out of control, driven, impulsive, compulsive sexual behavior began in early . He has been in a stable loving marriage for 40 years then began having covert hook-ups with young men.  Efforts to change problematic behaviors have included several years of psychotherapy and attenance in [facility]. He continues to struggle with recurrent and intense urges which cause him distress.     Client has been struggling with symptoms of depression and anxiety secondary to assisted and changing roles. These symptoms have impacted his functioning and exacerbate his CSB concerns.      Progress Toward Treatment Goals:   Satisfactory progress     Transferred care from Dr. Bacon to Dr. Adkins 11/10/22  Working to understand antecedents, risks, and urges  Working to understand negative cycles  Collateral session with client's wife, Elizabeth 22    Therapeutic Interventions/Treatment Strategies:    Area(s) of treatment focus addressed in this session included Symptom Management and Interpersonal Relationship Skills    Client and therapist reviewed last session's collateral session with his wife.  Client expressed finding it helpful to learn more about her understanding of the issues client is working on. Client processed his recent boundary violation. Therapist supported client to identify antecedents to the violation. Client shared that he was stopped in the process of the boundary violation, and that his wife is as upset at him as if he had. Therapist supported client to practice perspective-taking to identify factors that may have contributed to his wife's trust being violated and feeling hurt. Client expressed finding this exercise particularly helpful. Client and therapist planned to start PTSD interventions that client perceives contributing to his CSB. Client assigned to completed PTSD screener to assess severity of symptoms.     Psychotherapist offered support, feedback and validation and reinforced use of skills Treatment modalities used include Cognitive Behavioral Therapy Interpersonal  Support, Redirection and Feedback    Patient Response:   Patient responded to session by accepting feedback, giving feedback, listening, focusing on goals and accepting support  Possible barriers to participation / learning include: N/A    Current Mental Status Exam:   Appearance:  Appropriate   Eye Contact:  Good   Attitude / Demeanor: Cooperative   Speech      Rate / Production: Normal/ Responsive      Volume:  Normal  volume  Orientation:  All  Mood:   Normal  Affect:   Appropriate   Thought Content: Clear   Insight:   Good       Plan/Need for Future Services:  Return for therapy in 2 weeks to treat diagnosed problems.    Patient has a current master individualized treatment plan.  See Epic treatment plan for more information.    Referral / Collaboration:  Referral to another professional/service is not indicated at this time..  Emergency Services Needed?  No    Assignment:  Complete PTSD screener    Interactive Complexity:  There are four specific communication difficulties that complicate the work of the  primary psychiatric procedure.  Interactive complexity (+[id number]) may be reported when at least one of these difficulties is present.    Communication difficulties present during current the psychiatric procedure include:  1. None.      Signature/Title:      Mason Adkins, PhD, LP    [place] for Sexual and Gender Health, Sexual and Gender Health Clinic  Department of Family Medicine and Community Health  [facility]

## 2023-02-06 DIAGNOSIS — F91.8 CONDUCT DISORDER, UNDIFFERENTIATED TYPE: ICD-10-CM

## 2023-02-06 RX ORDER — FLUOXETINE 10 MG/1
CAPSULE ORAL
Qty: 60 CAPSULE | Refills: 0 | Status: SHIPPED | OUTPATIENT
Start: 2023-02-06 | End: 2023-03-08

## 2023-02-06 NOTE — TELEPHONE ENCOUNTER
Requested Medication: Prozac 10mg  Dose: 20mg  Quantity: 60  Refills: 0    Take 2 (10mg) capsules daily    Last seen at [facility]: 9/8/2022 -   Next Appointment with Provider: 3/2/2023      Dior Mike CMA

## 2023-02-09 ENCOUNTER — VIRTUAL VISIT (OUTPATIENT)
Dept: PSYCHOLOGY | Facility: CLINIC | Age: 68
End: 2023-02-09
Payer: COMMERCIAL

## 2023-02-09 DIAGNOSIS — F41.9 ANXIETY DISORDER, UNSPECIFIED TYPE: ICD-10-CM

## 2023-02-09 DIAGNOSIS — F43.23 ADJUSTMENT DISORDER WITH MIXED ANXIETY AND DEPRESSED MOOD: ICD-10-CM

## 2023-02-09 DIAGNOSIS — F91.8 CONDUCT DISORDER, UNDIFFERENTIATED TYPE: Primary | ICD-10-CM

## 2023-02-09 PROCEDURE — 90853 GROUP PSYCHOTHERAPY: CPT | Mod: VID

## 2023-02-09 NOTE — GROUP NOTE
[facility]  [address], [address]  [place]  [address]    Group Progress Note  [facility] - Progress Note    Date of Service: 23   Name: Ramin Murcia  : 1955  Medical Record Number: [id number]  START TIME:  4:00 PM  END TIME:  5:15 PM  FACILITATOR(S): Mason Adkins, PhD  TOPIC: SGHC Group Therapy  Type of Session: Group  :  Number of Attendees:  6  Number of Minutes:  75    SERVICE MODALITY:  Video Visit:      Provider verified identity through the following two step process.  Patient provided:  Patient is known previously to provider    Telemedicine Visit: The patient's condition can be safely assessed and treated via synchronous audio and visual telemedicine encounter.      Reason for Telemedicine Visit: Services only offered telehealth    Originating Site (Patient Location): Patient's home    Distant Site (Provider Location): [facility]: Sexual and Gender Health Clinic    Consent:  The patient/guardian has verbally consented to: the potential risks and benefits of telemedicine (video visit) versus in person care; bill my insurance or make self-payment for services provided; and responsibility for payment of non-covered services.     Patient would like the video invitation sent by:  Other e-mail: see chart    Mode of Communication:  Video Conference via Medical Zoom    Distant Location (Provider):  On-site    As the provider I attest to compliance with applicable laws and regulations related to telemedicine.      DSM-5 Diagnoses:  Encounter Diagnoses   Name Primary?     Anxiety disorder, unspecified type      Adjustment disorder with mixed anxiety and depressed mood      Other specified disruptive, impulse control, and conduct disorder Yes         Current Reported Symptoms and Status update:  Changes since last session include no boundary violations but some risk situations.     Patient's problems with out of control, driven,  impulsive, compulsive sexual behavior began in early 2020. He has been in a stable loving marriage for 40 years then began having covert hook-ups with young men.  Efforts to change problematic behaviors have included several years of psychotherapy and attenance in [facility]. He continues to struggle with recurrent and intense urges which cause him distress.     Client has been struggling with symptoms of depression and anxiety secondary to care home and changing roles. These symptoms have impacted his functioning and exacerbate his CSB concerns.      Progress Toward Treatment Goals:   Satisfactory progress     Therapeutic Interventions/Treatment Strategies:    Area(s) of treatment focus addressed in this session included Symptom Management and Interpersonal Relationship Skills    Interpersonal and CBT approaches were implemented in the group process to facilitate progress toward clients' treatment goals. Group members checked-in about treatment progress, challenges, and symptoms over the past week. Group members shared progress on group assignments and provided each other feedback. Client processed recent challenges with sexual functioning but how he and his wife worked around them to continue sexual intimacy. Client processed his recent trip and risk situations he experienced, and also how he managed the risk situations. Client provided input/feedback to others and responded to feedback from others.  Psychotherapist offered support, feedback and validation and provided redirection Treatment modalities used include [facility] THERAPY INTERVENTIONS: Cognitive Behavioral Therapy Interpersonal  Support, Redirection, Feedback and Cognitive Behavioral Therapy    Patient Response:   Patient responded to session by accepting feedback, giving feedback, listening, focusing on goals and accepting support  Possible barriers to participation / learning include: N/A    Current Mental Status Exam:   Appearance:  Appropriate   Eye  Contact:  Good   Attitude / Demeanor: Cooperative   Speech      Rate / Production: Normal/ Responsive      Volume:  Normal  volume  Orientation:  All  Mood:   Normal  Affect:   Appropriate   Thought Content: Clear   Insight:   Good       Plan/Need for Future Services:  Return for therapy in 1 week to treat diagnosed problems.    Patient has a current master individualized treatment plan.  See Epic treatment plan for more information.    Referral / Collaboration:  Referral to another professional/service is not indicated at this time..  Emergency Services Needed?  No    Assignment:  Continue working on history/antecedents    Interactive Complexity:  There are four specific communication difficulties that complicate the work of the primary psychiatric procedure.  Interactive complexity (+41994) may be reported when at least one of these difficulties is present.    Communication difficulties present during current the psychiatric procedure include:  1. None.      Signature/Title:    Mason Adkins, PhD, LP    [place] for Sexual [facility], Sexual and Gender Health Clinic  Department of Family Medicine and Community Health  [facility] [employer]

## 2023-02-12 ENCOUNTER — HEALTH MAINTENANCE LETTER (OUTPATIENT)
Age: 68
End: 2023-02-12

## 2023-02-16 ENCOUNTER — VIRTUAL VISIT (OUTPATIENT)
Dept: PSYCHOLOGY | Facility: CLINIC | Age: 68
End: 2023-02-16
Payer: COMMERCIAL

## 2023-02-16 DIAGNOSIS — F91.8 CONDUCT DISORDER, UNDIFFERENTIATED TYPE: Primary | ICD-10-CM

## 2023-02-16 DIAGNOSIS — F41.9 ANXIETY DISORDER, UNSPECIFIED TYPE: ICD-10-CM

## 2023-02-16 PROCEDURE — 90853 GROUP PSYCHOTHERAPY: CPT | Mod: VID

## 2023-02-16 NOTE — GROUP NOTE
[facility]  [address], [address]  [place]  [address]    Group Progress Note  [facility] - Progress Note    Date of Service: 23   Name: Ramin Murcia  : 1955  Medical Record Number: [id number]  START TIME:  4:00 PM  END TIME:  5:45 PM  FACILITATOR(S): Mason Adkins, PhD  TOPIC: SGHC Group Therapy  Type of Session: Group  :  Number of Attendees:  7  Number of Minutes:   105    SERVICE MODALITY:  Video Visit:      Provider verified identity through the following two step process.  Patient provided:  Patient is known previously to provider    Telemedicine Visit: The patient's condition can be safely assessed and treated via synchronous audio and visual telemedicine encounter.      Reason for Telemedicine Visit: Services only offered telehealth    Originating Site (Patient Location): Patient's home    Distant Site (Provider Location): [facility]: Sexual and Gender Health Clinic    Consent:  The patient/guardian has verbally consented to: the potential risks and benefits of telemedicine (video visit) versus in person care; bill my insurance or make self-payment for services provided; and responsibility for payment of non-covered services.     Patient would like the video invitation sent by:  Other e-mail: see chart    Mode of Communication:  Video Conference via Medical Zoom    Distant Location (Provider):  On-site    As the provider I attest to compliance with applicable laws and regulations related to telemedicine.      DSM-5 Diagnoses:  Encounter Diagnoses   Name Primary?     Other specified disruptive, impulse control, and conduct disorder Yes     Anxiety disorder, unspecified type          Current Reported Symptoms and Status update:  Changes since last session include improved communication and connection with his wife. No boundary violations.    Patient's problems with out of control, driven, impulsive, compulsive sexual  behavior began in early 2020. He has been in a stable loving marriage for 40 years then began having covert hook-ups with young men.  Efforts to change problematic behaviors have included several years of psychotherapy and attenance in [facility]. He continues to struggle with recurrent and intense urges which cause him distress.     Client has been struggling with symptoms of depression and anxiety secondary to shelter and changing roles. These symptoms have impacted his functioning and exacerbate his CSB concerns.      Progress Toward Treatment Goals:   Satisfactory progress     Therapeutic Interventions/Treatment Strategies:    Area(s) of treatment focus addressed in this session included Symptom Management, Interpersonal Relationship Skills and Sexual Health and Wellness    CBT and interpersonal interventions were implemented in the group process in support of group members  treatment. Checked in on the past week, including mental health and sexual health symptoms. Shared progress towards treatment goals. Client processed how he is reflecting on his CSB concerns, as well as on his own identity development. He shared how he wants to continue with self-improvement and a healthy self concept. Client provided supportive feedback to others and was receptive to feedback from others.   Psychotherapist offered support, feedback and validation and reinforced use of skills Treatment modalities used include [facility] THERAPY INTERVENTIONS: Cognitive Behavioral Therapy Interpersonal  Support and Feedback    Patient Response:   Patient responded to session by accepting feedback, giving feedback, listening, focusing on goals and accepting support  Possible barriers to participation / learning include: N/A    Current Mental Status Exam:   Appearance:  Appropriate   Eye Contact:  Good   Attitude / Demeanor: Cooperative   Speech      Rate / Production: Normal/ Responsive      Volume:  Normal   volume  Orientation:  All  Mood:   Normal  Affect:   Appropriate   Thought Content: Clear   Insight:   Good       Plan/Need for Future Services:  Return for therapy in 1 week to treat diagnosed problems.    Patient has a current master individualized treatment plan.  See Epic treatment plan for more information.    Referral / Collaboration:  Referral to another professional/service is not indicated at this time..  Emergency Services Needed?  No    Assignment:  Start work on negative and positive cycles    Interactive Complexity:  There are four specific communication difficulties that complicate the work of the primary psychiatric procedure.  Interactive complexity (+65151) may be reported when at least one of these difficulties is present.    Communication difficulties present during current the psychiatric procedure include:  1. None.      Signature/Title:      Mason Adkins, PhD, LP    [place] for Sexual and Gender Health, Sexual and Gender Health Clinic  Department of Family Medicine and Community Health  [employer] [facility] [employer]

## 2023-02-23 ENCOUNTER — VIRTUAL VISIT (OUTPATIENT)
Dept: PSYCHOLOGY | Facility: CLINIC | Age: 68
End: 2023-02-23
Payer: MEDICARE

## 2023-02-23 DIAGNOSIS — F43.23 ADJUSTMENT DISORDER WITH MIXED ANXIETY AND DEPRESSED MOOD: ICD-10-CM

## 2023-02-23 DIAGNOSIS — F41.9 ANXIETY DISORDER, UNSPECIFIED TYPE: ICD-10-CM

## 2023-02-23 DIAGNOSIS — F91.8 CONDUCT DISORDER, UNDIFFERENTIATED TYPE: Primary | ICD-10-CM

## 2023-02-23 PROCEDURE — 90853 GROUP PSYCHOTHERAPY: CPT | Mod: VID

## 2023-02-23 NOTE — GROUP NOTE
[facility]  [address], [address]  [place]  [address]    Group Progress Note  Gender [facility] Sexual [facility] Clinic - Progress Note    Date of Service: 23   Name: Ramin Murcia  : 1955  Medical Record Number: [id number]  START TIME:  4:00 PM  END TIME:  5:50 PM  FACILITATOR(S): Mason Adkins, PhD  TOPIC: SGHC Group Therapy  Type of Session: Group  :  Number of Attendees:  6  Number of Minutes:  110    SERVICE MODALITY:  Video Visit:      Provider verified identity through the following two step process.  Patient provided:  Patient is known previously to provider    Telemedicine Visit: The patient's condition can be safely assessed and treated via synchronous audio and visual telemedicine encounter.      Reason for Telemedicine Visit: Services only offered telehealth    Originating Site (Patient Location): Patient's home    Distant Site (Provider Location): Provider Remote Setting- Home Office    Consent:  The patient/guardian has verbally consented to: the potential risks and benefits of telemedicine (video visit) versus in person care; bill my insurance or make self-payment for services provided; and responsibility for payment of non-covered services.     Patient would like the video invitation sent by:  Send to e-mail at: No e-mail address on record    Mode of Communication:  Video Conference via Medical Zoom    Distant Location (Provider):  Off-site    As the provider I attest to compliance with applicable laws and regulations related to telemedicine.      DSM-5 Diagnoses:  Encounter Diagnoses   Name Primary?     Other specified disruptive, impulse control, and conduct disorder Yes     Anxiety disorder, unspecified type      Adjustment disorder with mixed anxiety and depressed mood          Current Reported Symptoms and Status update:  Changes since last session: no boundary violations, some risk situations, some urges, connecting with others, some  anxiety.    Patient's problems with out of control, driven, impulsive, compulsive sexual behavior began in early 2020. He has been in a stable loving marriage for 40 years then began having covert hook-ups with young men.  Efforts to change problematic behaviors have included several years of psychotherapy and attenance in [facility]. He continues to struggle with recurrent and intense urges which cause him distress.     Client has been struggling with symptoms of depression and anxiety secondary to care home and changing roles. These symptoms have impacted his functioning and exacerbate his CSB concerns.      Progress Toward Treatment Goals:   Satisfactory progress     Therapeutic Interventions/Treatment Strategies:    Area(s) of treatment focus addressed in this session included Symptom Management and Interpersonal Relationship Skills    Cognitive behavioral and Interpersonal approaches were implemented in the group process. Group members checked in on regarding progress on therapy assignments, wellbeing, and maintenance of boundaries. Group members provided each other supportive feedback about treatment progress and shared experiences. Client processed a particularly difficult antecedent for him, which he associates with contributing to negative core beliefs and CSB acting out. Client was an active participant in group and offered support to others.  Psychotherapist offered support, feedback and validation and reinforced use of skills Treatment modalities used include [facility] THERAPY INTERVENTIONS: Cognitive Behavioral Therapy Interpersonal  Support, Redirection and Feedback    Patient Response:   Patient responded to session by accepting feedback, giving feedback, listening, focusing on goals and accepting support  Possible barriers to participation / learning include: N/A    Current Mental Status Exam:   Appearance:  Appropriate   Eye Contact:  Good   Attitude / Demeanor: Cooperative   Speech      Rate /  Production: Normal/ Responsive      Volume:  Normal  volume  Orientation:  All  Mood:   Normal  Affect:   Appropriate   Thought Content: Clear   Insight:   Good       Plan/Need for Future Services:  Return for therapy in 1 week to treat diagnosed problems.    Patient has a current master individualized treatment plan.  See Epic treatment plan for more information.    Referral / Collaboration:  Referral to another professional/service is not indicated at this time..  Emergency Services Needed?  No    Assignment:  Continue antecedents/negative cycle    Interactive Complexity:  There are four specific communication difficulties that complicate the work of the primary psychiatric procedure.  Interactive complexity (+39144) may be reported when at least one of these difficulties is present.    Communication difficulties present during current the psychiatric procedure include:  1. None.      Signature/Title:      Mason Adkins, PhD, LP    [place] for Sexual and Gender Health, Sexual and Gender Health Clinic  Department of Family Medicine and Community Health  [employer] [facility] [employer]

## 2023-02-27 ENCOUNTER — OFFICE VISIT (OUTPATIENT)
Dept: PSYCHOLOGY | Facility: CLINIC | Age: 68
End: 2023-02-27
Payer: COMMERCIAL

## 2023-02-27 DIAGNOSIS — F41.9 ANXIETY DISORDER, UNSPECIFIED TYPE: ICD-10-CM

## 2023-02-27 DIAGNOSIS — F43.9 TRAUMA AND STRESSOR-RELATED DISORDER: ICD-10-CM

## 2023-02-27 DIAGNOSIS — F91.8 CONDUCT DISORDER, UNDIFFERENTIATED TYPE: Primary | ICD-10-CM

## 2023-02-27 PROCEDURE — 90834 PSYTX W PT 45 MINUTES: CPT | Performed by: STUDENT IN AN ORGANIZED HEALTH CARE EDUCATION/TRAINING PROGRAM

## 2023-02-27 NOTE — PROGRESS NOTES
[place] for Sexual and Gender Health - Progress Note    Date of Service: 23   Name: Ramin Murcia  : 1955  Medical Record Number: [id number]  Treating Provider: Mason Adkins, PhD  Type of Session: Individual  Present in Session: Client, therapist, and medical student Jose Guadalupe Christianson (with client's consent)  Session Start and Stop Time: 2:05pm-2:55pm  Number of Minutes:  50    SERVICE MODALITY:  In-person    DSM-5 Diagnoses:  Encounter Diagnoses   Name Primary?     Other specified disruptive, impulse control, and conduct disorder Yes     Anxiety disorder, unspecified type      Trauma and stressor-related disorder      Current Reported Symptoms and Status update:  Changes since last session include no boundary violations, some urges to violate boundaries, and increased communication with his wife.    Patient's problems with out of control, driven, impulsive, compulsive sexual behavior began in early . He has been in a stable loving marriage for 40 years then began having covert hook-ups with young men.  Efforts to change problematic behaviors have included several years of psychotherapy and attenance in [facility]. He continues to struggle with recurrent and intense urges which cause him distress.     Client has been struggling with symptoms of depression and anxiety secondary to skilled nursing and changing roles. These symptoms have impacted his functioning and exacerbate his CSB concerns.    Client reported distress related to adverse childhood experiences that were traumatic. He attributes distress to ineffective coping, patterns of avoidance/numbing, and difficulties with functioning in relationships.      Progress Toward Treatment Goals:   Satisfactory progress   Transferred care from Dr. Bacon to Dr. Adkins 11/10/22  Working to understand antecedents, risks, and urges  Working to understand negative cycles  Collateral session with client's wifeElizabeth 23  Started written exposure therapy for  trauma 2/27/23    Therapeutic Interventions/Treatment Strategies:    Area(s) of treatment focus addressed in this session included Symptom Management    Client and therapist planned for client to start written exposure therapy for trauma symptoms. Therapist supported client to identify one particular traumatic event to work through. Client's PTSD symptoms as measured by the PCL-5 were 27, subthreshold for diagnosis of PTSD. Client shared how symptoms impact functioning and relate to CSB concerns. Therapist provided psychoeducation on written exposure therapy, including potential for increased activation. We reviewed coping and social support. At the start of the intervention, client's subjective units of distress (SUDS) was 35/100. At the end of the intervention, his SUDS was 50/100.    Psychotherapist offered support, feedback and validation and reinforced use of skills Treatment modalities used include cognitive behavioral therapy and written exposure therapy.      Patient Response:   Patient responded to session by accepting feedback, giving feedback, focusing on goals and accepting support  Possible barriers to participation / learning include: N/A    Current Mental Status Exam:   Appearance:  Appropriate   Eye Contact:  Good   Attitude / Demeanor: Cooperative   Speech      Rate / Production: Normal/ Responsive      Volume:  Normal  volume  Orientation:  All  Mood:   Normal  Affect:   Appropriate   Thought Content: Clear   Insight:   Good       Plan/Need for Future Services:  Return for therapy in 1 week to treat diagnosed problems.    Patient has a current master individualized treatment plan.  See Epic treatment plan for more information.    Referral / Collaboration:  Referral to another professional/service is not indicated at this time..  Emergency Services Needed?  No    Assignment:  Engage social support and coping strategies    Interactive Complexity:  There are four specific communication difficulties  that complicate the work of the primary psychiatric procedure.  Interactive complexity (+73927) may be reported when at least one of these difficulties is present.    Communication difficulties present during current the psychiatric procedure include:  1. None.      Signature/Title:    Mason Adkins, PhD, LP    [place] for Sexual and Gender Health, Sexual and Gender Health Clinic  Department of Family Medicine and Community Health  [facility]

## 2023-03-02 ENCOUNTER — VIRTUAL VISIT (OUTPATIENT)
Dept: PSYCHIATRY | Facility: CLINIC | Age: 68
End: 2023-03-02
Payer: COMMERCIAL

## 2023-03-02 DIAGNOSIS — F33.1 MODERATE EPISODE OF RECURRENT MAJOR DEPRESSIVE DISORDER (H): Primary | ICD-10-CM

## 2023-03-02 PROCEDURE — 99213 OFFICE O/P EST LOW 20 MIN: CPT | Mod: VID | Performed by: PHYSICIAN ASSISTANT

## 2023-03-03 NOTE — PROGRESS NOTES
Video start time: 705  Video end time: 730    Telemedicine Visit: The patient's condition can be safely assessed and treated via synchronous audio and visual telemedicine encounter.      Reason for Telemedicine Visit: Services only offered telehealth    Originating Site (Patient Location): Patient's home    Distant Site (Provider Location): Provider Remote Setting- Home Office    Consent:  The patient/guardian has verbally consented to: the potential risks and benefits of telemedicine (video visit) versus in person care; bill my insurance or make self-payment for services provided; and responsibility for payment of non-covered services.     Mode of Communication:  Video Conference via [employer]    As the provider I attest to compliance with applicable laws and regulations related to telemedicine.    [facility]    Name:  Darlene Murcia   Aliases:  DARLENE MURCIA  :  1955   MRN:  [id number]  Treating Provider: Quan Richards PA-C EdD     Medications at start of visit:  Outpatient Medications Prior to Visit   Medication Sig Dispense Refill     FLUoxetine (PROZAC) 10 MG capsule Take 2 (10mg) capsules daily 60 capsule 0     naltrexone (DEPADE/REVIA) 50 MG tablet Take 1 tablet daily 90 tablet 0     tadalafil (CIALIS) 5 MG tablet Take 1 tablet by mouth daily       No facility-administered medications prior to visit.       Allergies:  No Known Allergies    Today's Visit     Current Complaint: Darlene presents for a recheck.  At their last appointment no changes were made to his regimen.  He is reporting overall stability of psychiatric sx. The patient is not endorsing suicidal/homicidal ideation, active planning, intent or means.  The patient is not endorsing s/s suggestive of hypomania/gera or psychosis.  The patient is endorsing good compliance with and efficacy of their medication regimen without s/e.       Review of Systems: A review of the following systems was negative: Opthalmic, ENT, Cardiovascular,  Gastrointestinal, Genitourinary, Musculoskeletal, Integumentary, Neurological, Endocrine, Respiratory, Hematologic, Immunologic      Chemical History: Denies usage of and cravings for alcohol, cannabis, heroin, methamphetamine, cocaine, prescription opioids/benzodiazepines.      Social History: No interval change    Mental Status Exam: The patient's orientation, memory,  Attention, language and fund of knowledge are at their usual best baseline.  Grooming/Hygiene: adequate  Eye Contact: normal  Psychomotor: normal  Observed mood and affect: appropriate  Judgment: intact, with thoughtful decision making and insight  Speech: normal rate, rhythm, tone and volume  Thought processes: normal, with normal rate of thought  Associations:  No deficiency  Abnormal Thoughts: none      Assessment: This is a 68 yo man who carries the diagnosis of MDd. The patient's questions were answered to their satisfaction regarding the plan as outlined below. Side effects, risks/benefits/alternatives regarding all psychiatric medications were discussed with the patient in detail.  At this time, pt is in agreement with my recommendation that we make no changes to his regimen.  We discussed my transition from the clinic and options for continuity of care.         Plan:    There are no Patient Instructions on file for this visit.     Total face-to-face time: 30 min    Counseling/Coordination of care greater than 50%.    Counseling/Coordination of care included: Educational counseling regarding psychiatric medications, side effects, interactions.

## 2023-03-06 DIAGNOSIS — F91.8 CONDUCT DISORDER, UNDIFFERENTIATED TYPE: ICD-10-CM

## 2023-03-06 NOTE — TELEPHONE ENCOUNTER
Requested Medication: Prozac 10mg  Dose: 20mg  Quantity: 60  Refills: 0    Take 2 (10mg) capsules daily    Last seen at [facility]: 3/2/2023 -   Next Appointment with Provider:  Visit date not found      Dior Mike CMA

## 2023-03-07 ENCOUNTER — OFFICE VISIT (OUTPATIENT)
Dept: PSYCHOLOGY | Facility: CLINIC | Age: 68
End: 2023-03-07
Payer: COMMERCIAL

## 2023-03-07 DIAGNOSIS — F43.9 TRAUMA AND STRESSOR-RELATED DISORDER: Primary | ICD-10-CM

## 2023-03-07 DIAGNOSIS — F41.9 ANXIETY DISORDER, UNSPECIFIED TYPE: ICD-10-CM

## 2023-03-07 DIAGNOSIS — F43.23 ADJUSTMENT DISORDER WITH MIXED ANXIETY AND DEPRESSED MOOD: ICD-10-CM

## 2023-03-07 PROCEDURE — 90834 PSYTX W PT 45 MINUTES: CPT | Performed by: STUDENT IN AN ORGANIZED HEALTH CARE EDUCATION/TRAINING PROGRAM

## 2023-03-07 NOTE — PROGRESS NOTES
[place] [facility] - Progress Note    Date of Service: 3/07/23   Name: Ramin Murcia  : 1955  Medical Record Number: [id number]  Treating Provider: Mason Adkins, PhD  Type of Session: Individual  Present in Session: Client and therapist  Session Start and Stop Time: 11:05am-11:55am  Number of Minutes:  50    SERVICE MODALITY:  In-person    DSM-5 Diagnoses:  Encounter Diagnoses   Name Primary?     Trauma and stressor-related disorder Yes     Anxiety disorder, unspecified type      Adjustment disorder with mixed anxiety and depressed mood      Current Reported Symptoms and Status update:  Changes since last session include increased thoughts/activation related to past trauma, some urges to violate boundaries, and no boundary violations.    Patient's problems with out of control, driven, impulsive, compulsive sexual behavior began in early . He has been in a stable loving marriage for 40 years then began having covert hook-ups with young men.  Efforts to change problematic behaviors have included several years of psychotherapy and attenance in [facility]. He continues to struggle with recurrent and intense urges which cause him distress.     Client has been struggling with symptoms of depression and anxiety secondary to residential and changing roles. These symptoms have impacted his functioning and exacerbate his CSB concerns.    Client reported distress related to adverse childhood experiences that were traumatic. He attributes distress to ineffective coping, patterns of avoidance/numbing, and difficulties with functioning in relationships.       Progress Toward Treatment Goals:   Satisfactory progress   Transferred care from Dr. Bacon to Dr. Adkins 11/10/22  Working to understand antecedents, risks, and urges  Working to understand negative cycles  Collateral session with client's wife, Elizabeth 23  Started written exposure therapy for trauma 23    Therapeutic  Interventions/Treatment Strategies:    Area(s) of treatment focus addressed in this session included Symptom Management    Client reported increased trauma-related activation since last session. He shared how he has coped, including engaging social support. He also shared how he is starting to think differently about the event and is recognizing complex feelings for his father.    Today was session 2 of written exposure therapy.    Subjective units of distress:  SUDS at start: 60/100  SUDS at end: 70/100    We reviewed coping strategies and support for client.    Psychotherapist offered support, feedback and validation and reinforced use of skills Treatment modalities used include cognitive behavioral therapy and exposure interventions for trauma.  Support and Structured Activity    Patient Response:   Patient responded to session by accepting feedback, giving feedback, listening, focusing on goals and accepting support  Possible barriers to participation / learning include: N/A    Current Mental Status Exam:   Appearance:  Appropriate   Eye Contact:  Good   Attitude / Demeanor: Cooperative   Speech      Rate / Production: Normal/ Responsive      Volume:  Normal  volume  Orientation:  All  Mood:   Normal  Affect:   Appropriate   Thought Content: Clear   Insight:   Good       Plan/Need for Future Services:  Return for therapy in 1 week to treat diagnosed problems.    Patient has a current master individualized treatment plan.  See Epic treatment plan for more information.    Referral / Collaboration:  Referral to another professional/service is not indicated at this time..  Emergency Services Needed?  No    Assignment:  Engage coping strategies and social support    Interactive Complexity:  There are four specific communication difficulties that complicate the work of the primary psychiatric procedure.  Interactive complexity (+69407) may be reported when at least one of these difficulties is  present.    Communication difficulties present during current the psychiatric procedure include:  1. None.      Signature/Title:    Mason Adkins, PhD, LP    [place] [facility], Sexual and Gender Health Clinic  Department of Family Medicine and Community Health  [facility]

## 2023-03-08 RX ORDER — FLUOXETINE 10 MG/1
CAPSULE ORAL
Qty: 60 CAPSULE | Refills: 0 | Status: SHIPPED | OUTPATIENT
Start: 2023-03-08 | End: 2023-04-11

## 2023-03-09 ENCOUNTER — VIRTUAL VISIT (OUTPATIENT)
Dept: PSYCHOLOGY | Facility: CLINIC | Age: 68
End: 2023-03-09
Payer: COMMERCIAL

## 2023-03-09 DIAGNOSIS — F91.8 CONDUCT DISORDER, UNDIFFERENTIATED TYPE: Primary | ICD-10-CM

## 2023-03-09 DIAGNOSIS — F43.9 TRAUMA AND STRESSOR-RELATED DISORDER: ICD-10-CM

## 2023-03-09 DIAGNOSIS — F41.9 ANXIETY DISORDER, UNSPECIFIED TYPE: ICD-10-CM

## 2023-03-09 PROCEDURE — 90853 GROUP PSYCHOTHERAPY: CPT | Mod: VID

## 2023-03-09 NOTE — GROUP NOTE
Gender and Sexual Health Clinic  [address], [address]  [place]  [address]    Group Progress Note  [facility] Clinic - Progress Note    Date of Service: 3/09/23   Name: Ramin Murcia  : 1955  Medical Record Number: [id number]  START TIME:  4:00 PM  END TIME:  6:00 PM  FACILITATOR(S): Mason Adkins, PhD  TOPIC: SGHC Group Therapy  Type of Session: Group  :  Number of Attendees:  7  Number of Minutes: 120    SERVICE MODALITY:  Video Visit:      Provider verified identity through the following two step process.  Patient provided:  Patient is known previously to provider    Telemedicine Visit: The patient's condition can be safely assessed and treated via synchronous audio and visual telemedicine encounter.      Reason for Telemedicine Visit: Services only offered telehealth    Originating Site (Patient Location): Patient's home    Distant Site (Provider Location): [facility]: Sexual and Gender Health Clinic    Consent:  The patient/guardian has verbally consented to: the potential risks and benefits of telemedicine (video visit) versus in person care; bill my insurance or make self-payment for services provided; and responsibility for payment of non-covered services.     Patient would like the video invitation sent by:  Other e-mail: see chart    Mode of Communication:  Video Conference via [employer]    Distant Location (Provider):  On-site    As the provider I attest to compliance with applicable laws and regulations related to telemedicine.      DSM-5 Diagnoses:  Encounter Diagnoses   Name Primary?     Other specified disruptive, impulse control, and conduct disorder (hypersexual disorder) Yes     Trauma and stressor-related disorder      Anxiety disorder, unspecified type      Current Reported Symptoms and Status update:  Changes since last session include increased trauma activation, increased use of coping strategies, increased sexual urges, no boundary  violations.    Patient's problems with out of control, driven, impulsive, compulsive sexual behavior began in early 2020. He has been in a stable loving marriage for 40 years then began having covert hook-ups with young men.  Efforts to change problematic behaviors have included several years of psychotherapy and attenance in [facility]. He continues to struggle with recurrent and intense urges which cause him distress.     Client has been struggling with symptoms of depression and anxiety secondary to [facility] and changing roles. These symptoms have impacted his functioning and exacerbate his CSB concerns.    Client reported distress related to adverse childhood experiences that were traumatic. He attributes distress to ineffective coping, patterns of avoidance/numbing, and difficulties with functioning in relationships.      Progress Toward Treatment Goals:   Satisfactory progress     Therapeutic Interventions/Treatment Strategies:    Area(s) of treatment focus addressed in this session included Symptom Management, Interpersonal Relationship Skills and Sexual Health and Wellness    Group members introduced themselves to new group member and medical student Yuri Lu. Clients checked in on coping, boundaries, and progress toward treatment. Group members reviewed group rules and structure. Client processed his individual therapy wherein he is working through traumatic experiences. Client shared how he is making new connections with past experiences and CSB. He shared that he is experiencing increased activation and reached out to the group for support. Client provided supportive feedback to others and was receptive to feedback from others.  Psychotherapist offered support, feedback and validation and reinforced use of skills Treatment modalities used include [facility] THERAPY INTERVENTIONS: Cognitive Behavioral Therapy Interpersonal  Support, Redirection and Feedback    Patient Response:   Patient responded to session  by accepting feedback, giving feedback, listening, focusing on goals and accepting support  Possible barriers to participation / learning include: N/A    Current Mental Status Exam:   Appearance:  Appropriate   Eye Contact:  Good   Attitude / Demeanor: Cooperative  Interested  Speech      Rate / Production: Normal/ Responsive      Volume:  Normal  volume  Orientation:  All  Mood:   Normal  Affect:   Appropriate   Thought Content: Clear   Insight:   Good       Plan/Need for Future Services:  Return for therapy in 1 week to treat diagnosed problems.    Patient has a current master individualized treatment plan.  See Epic treatment plan for more information.    Referral / Collaboration:  Referral to another professional/service is not indicated at this time..  Emergency Services Needed?  No    Assignment:  Continue effective coping and engaging social support    Interactive Complexity:  There are four specific communication difficulties that complicate the work of the primary psychiatric procedure.  Interactive complexity (+66605) may be reported when at least one of these difficulties is present.    Communication difficulties present during current the psychiatric procedure include:  1. None.      Signature/Title:      Mason Adkins, PhD, LP    [place] for Sexual and Gender [facility], Sexual and Gender Health Clinic  Department of Family Medicine and Community Health  [facility] [employer]

## 2023-03-13 ENCOUNTER — OFFICE VISIT (OUTPATIENT)
Dept: PSYCHOLOGY | Facility: CLINIC | Age: 68
End: 2023-03-13
Payer: COMMERCIAL

## 2023-03-13 DIAGNOSIS — F43.9 TRAUMA AND STRESSOR-RELATED DISORDER: Primary | ICD-10-CM

## 2023-03-13 DIAGNOSIS — F91.8 CONDUCT DISORDER, UNDIFFERENTIATED TYPE: ICD-10-CM

## 2023-03-13 DIAGNOSIS — F41.9 ANXIETY DISORDER, UNSPECIFIED TYPE: ICD-10-CM

## 2023-03-13 DIAGNOSIS — F43.23 ADJUSTMENT DISORDER WITH MIXED ANXIETY AND DEPRESSED MOOD: ICD-10-CM

## 2023-03-13 PROCEDURE — 90837 PSYTX W PT 60 MINUTES: CPT | Performed by: STUDENT IN AN ORGANIZED HEALTH CARE EDUCATION/TRAINING PROGRAM

## 2023-03-13 NOTE — PROGRESS NOTES
[place] for Sexual and Gender Health - Progress Note    Date of Service: 3/13/23   Name: Ramin Murcia  : 1955  Medical Record Number: [id number]  Treating Provider: Mason Adkins, PhD  Type of Session: Individual  Present in Session: Client and therapist  Session Start and Stop Time: 10:00am-10:55am  Number of Minutes:  55    SERVICE MODALITY:  In-person    DSM-5 Diagnoses:  Encounter Diagnoses   Name Primary?     Trauma and stressor-related disorder Yes     Other specified disruptive, impulse control, and conduct disorder (hypersexual disorder)      Anxiety disorder, unspecified type      Adjustment disorder with mixed anxiety and depressed mood          Current Reported Symptoms and Status update:  Changes since last session include increased activation/anxiety related to past trauma, increased urges to violate boundaries, no boundary violations. He is processing past trauma through exposure interventions. He is coping with the activation.    Patient's problems with out of control, driven, impulsive, compulsive sexual behavior began in early . He has been in a stable loving marriage for 40 years then began having covert hook-ups with young men.  Efforts to change problematic behaviors have included several years of psychotherapy and attenance in [facility]. He continues to struggle with recurrent and intense urges which cause him distress.     Client has been struggling with symptoms of depression and anxiety secondary to detention and changing roles. These symptoms have impacted his functioning and exacerbate his CSB concerns.    Client reported distress related to adverse childhood experiences that were traumatic. He attributes distress to ineffective coping, patterns of avoidance/numbing, and difficulties with functioning in relationships.      Progress Toward Treatment Goals:   Satisfactory progress      Transferred care from Dr. Bacon to Dr. Adkins 11/10/22  Working to understand antecedents,  risks, and urges  Working to understand negative cycles  Collateral session with client's wife, Elizabeth 23  Started written exposure therapy for trauma 23      Therapeutic Interventions/Treatment Strategies:    Area(s) of treatment focus addressed in this session included Symptom Management    Today was session 3 of written exposure therapy. Client processed recent activation, challenges with sexual urges, and difficulty with sexual functioning. He connected these concerns to processing the trauma. He is coping with the activation and feels in control. Therapist validated client's experience and provided psychoeducation on how exposure interventions can temporarily increase trauma based activation. Client then engaged in the exposure intervention. We then reviewed coping and social support.    Subjective Units of Distress:  Beginnin  End:     Psychotherapist offered support, feedback and validation and reinforced use of skills Treatment modalities used include Cognitive Behavioral Therapy and written exposure therapy.  Support, Feedback, Structured Activity and Education    Patient Response:   Patient responded to session by accepting feedback, giving feedback, listening, focusing on goals and accepting support  Possible barriers to participation / learning include: N/A    Current Mental Status Exam:   Appearance:  Appropriate   Eye Contact:  Good   Attitude / Demeanor: Cooperative   Speech      Rate / Production: Normal/ Responsive      Volume:  Normal  volume  Orientation:  All  Mood:   Normal  Affect:   Appropriate   Thought Content: Clear   Insight:   Good       Plan/Need for Future Services:  Return for therapy in 1 week to treat diagnosed problems.    Patient has a current master individualized treatment plan.  See Epic treatment plan for more information.    Referral / Collaboration:  Referral to another professional/service is not indicated at this time..  Emergency Services  Needed?  No    Assignment:  Continue coping and engaging social support    Interactive Complexity:  There are four specific communication difficulties that complicate the work of the primary psychiatric procedure.  Interactive complexity (+31874) may be reported when at least one of these difficulties is present.    Communication difficulties present during current the psychiatric procedure include:  1. None.      Signature/Title:      Mason Adkins, PhD, LP    [place] for Sexual and Gender Health, Sexual and Gender Health Clinic  Department of Family Medicine and Community Health  [facility]

## 2023-03-16 ENCOUNTER — VIRTUAL VISIT (OUTPATIENT)
Dept: PSYCHOLOGY | Facility: CLINIC | Age: 68
End: 2023-03-16
Payer: COMMERCIAL

## 2023-03-16 DIAGNOSIS — F91.8 CONDUCT DISORDER, UNDIFFERENTIATED TYPE: Primary | ICD-10-CM

## 2023-03-16 DIAGNOSIS — F43.9 TRAUMA AND STRESSOR-RELATED DISORDER: ICD-10-CM

## 2023-03-16 DIAGNOSIS — F41.9 ANXIETY DISORDER, UNSPECIFIED TYPE: ICD-10-CM

## 2023-03-16 PROCEDURE — 90853 GROUP PSYCHOTHERAPY: CPT | Mod: VID

## 2023-03-16 NOTE — GROUP NOTE
Gender and Sexual Health Clinic  [address], [address]  [place]  [address]    Group Progress Note  Gender and Sexual Health Clinic - Progress Note    Date of Service: 3/16/23   Name: Ramin Murcia  : 1955  Medical Record Number: [id number]  START TIME:  4:00 PM  END TIME:  6:00 PM  FACILITATOR(S): Mason Adkins, PhD  TOPIC: SGHC Group Therapy  Type of Session: Group  :  Number of Attendees:  8  Number of Minutes:  120    SERVICE MODALITY:  Video Visit:      Provider verified identity through the following two step process.  Patient provided:  Patient is known previously to provider    Telemedicine Visit: The patient's condition can be safely assessed and treated via synchronous audio and visual telemedicine encounter.      Reason for Telemedicine Visit: Services only offered telehealth    Originating Site (Patient Location): Patient's home    Distant Site (Provider Location): Provider Remote Setting- Home Office    Consent:  The patient/guardian has verbally consented to: the potential risks and benefits of telemedicine (video visit) versus in person care; bill my insurance or make self-payment for services provided; and responsibility for payment of non-covered services.     Patient would like the video invitation sent by:  Other e-mail: see chart    Mode of Communication:  Video Conference via Medical Zoom    Distant Location (Provider):  Off-site    As the provider I attest to compliance with applicable laws and regulations related to telemedicine.      DSM-5 Diagnoses:  Encounter Diagnoses   Name Primary?     Other specified disruptive, impulse control, and conduct disorder (hypersexual disorder) Yes     Trauma and stressor-related disorder      Anxiety disorder, unspecified type          Current Reported Symptoms and Status update:  Changes since last session include increased activation, increased urges to violate boundaries, working on trauma, and engaging in coping.    Progress  Toward Treatment Goals:   Satisfactory progress     Therapeutic Interventions/Treatment Strategies:    Area(s) of treatment focus addressed in this session included Symptom Management, Interpersonal Relationship Skills and Sexual Health and Wellness    Medical student Yuri Lu attended group with members  consent. CBT and Interpersonal approaches were integrated into the group therapy process to address members  treatment progress. Group members checked in on treatment progress, boundary maintenance/violations, coping, and wellness. Client shared how he is working to have more mindfulness to reduce how automatic CSB feels. He also processed how trauma-related activation relates with CSB. Client provided supportive feedback to others and was responsive to input from others.  Psychotherapist offered support, feedback and validation and reinforced use of skills Treatment modalities used include [facility] THERAPY INTERVENTIONS: Cognitive Behavioral Therapy Interpersonal  Support, Redirection and Feedback    Patient Response:   Patient responded to session by accepting feedback, giving feedback, listening, focusing on goals and accepting support  Possible barriers to participation / learning include: N/A    Current Mental Status Exam:   Appearance:  Appropriate   Eye Contact:  Good   Attitude / Demeanor: Cooperative   Speech      Rate / Production: Normal/ Responsive      Volume:  Normal  volume  Orientation:  All  Mood:   Normal  Affect:   Appropriate   Thought Content: Clear   Insight:   Good       Plan/Need for Future Services:  Return for therapy in 1 week to treat diagnosed problems.    Patient has a current master individualized treatment plan.  See Epic treatment plan for more information.    Referral / Collaboration:  Referral to another professional/service is not indicated at this time..  Emergency Services Needed?  No    Assignment:  Return to group in 1 week    Interactive Complexity:  There are four  specific communication difficulties that complicate the work of the primary psychiatric procedure.  Interactive complexity (+42117) may be reported when at least one of these difficulties is present.    Communication difficulties present during current the psychiatric procedure include:  1. None.      Signature/Title:      Mason Adkins, PhD, LP    [place] for Sexual and Gender Health, Sexual and Gender Health Clinic  Department of Family Medicine and Community Health  [facility]

## 2023-03-20 ENCOUNTER — OFFICE VISIT (OUTPATIENT)
Dept: PSYCHOLOGY | Facility: CLINIC | Age: 68
End: 2023-03-20
Payer: COMMERCIAL

## 2023-03-20 DIAGNOSIS — F43.23 ADJUSTMENT DISORDER WITH MIXED ANXIETY AND DEPRESSED MOOD: ICD-10-CM

## 2023-03-20 DIAGNOSIS — F43.9 TRAUMA AND STRESSOR-RELATED DISORDER: Primary | ICD-10-CM

## 2023-03-20 DIAGNOSIS — F91.8 CONDUCT DISORDER, UNDIFFERENTIATED TYPE: ICD-10-CM

## 2023-03-20 DIAGNOSIS — F41.9 ANXIETY DISORDER, UNSPECIFIED TYPE: ICD-10-CM

## 2023-03-20 PROCEDURE — 90837 PSYTX W PT 60 MINUTES: CPT | Performed by: STUDENT IN AN ORGANIZED HEALTH CARE EDUCATION/TRAINING PROGRAM

## 2023-03-20 NOTE — PROGRESS NOTES
[place] for Sexual and Gender Health - Progress Note    Date of Service: 3/20/23   Name: Ramin Murcia  : 1955  Medical Record Number: [id number]  Treating Provider: Mason Adkins, PhD  Type of Session: Individual  Present in Session: Client and therapist  Session Start and Stop Time: 10:01am-10:54am  Number of Minutes:  53    SERVICE MODALITY:  In-person    DSM-5 Diagnoses:  Encounter Diagnoses   Name Primary?     Trauma and stressor-related disorder Yes     Other specified disruptive, impulse control, and conduct disorder (hypersexual disorder)      Anxiety disorder, unspecified type      Adjustment disorder with mixed anxiety and depressed mood      Current Reported Symptoms and Status update:  Changes since last session include decreased trauma-related activation and arousal; improved anxiety; no boundary violations.     Progress Toward Treatment Goals:   Satisfactory progress      Patient's problems with out of control, driven, impulsive, compulsive sexual behavior began in early . He has been in a stable loving marriage for 40 years then began having covert hook-ups with young men.  Efforts to change problematic behaviors have included several years of psychotherapy and attenance in [facility]. He continues to struggle with recurrent and intense urges which cause him distress.     Client has been struggling with symptoms of depression and anxiety secondary to jail and changing roles. These symptoms have impacted his functioning and exacerbate his CSB concerns.    Client reported distress related to adverse childhood experiences that were traumatic. He attributes distress to ineffective coping, patterns of avoidance/numbing, and difficulties with functioning in relationships.      Therapeutic Interventions/Treatment Strategies:    Area(s) of treatment focus addressed in this session included Symptom Management and Interpersonal Relationship Skills    Client reported some anxiety related to  high-risk situations but overall improvement in activation and anxiety related to trauma. He described engaging his social support to help him manage high-risk situations and not violate boundaries. Therapist provided feedback on client's progress in written exposure therapy. Client engaged in session 4 of written exposure therapy. We reviewed coping strategies and social support.    Subjective units of distress:  Start: 30/100  End: 20/100      Psychotherapist offered support, feedback and validation and reinforced use of skills Treatment modalities used include Cognitive Behavioral Therapy and written exposure therapy.  Support and Structured Activity    Patient Response:   Patient responded to session by accepting feedback, giving feedback, listening, focusing on goals and accepting support  Possible barriers to participation / learning include: N/A    Current Mental Status Exam:   Appearance:  Appropriate   Eye Contact:  Good   Attitude / Demeanor: Cooperative   Speech      Rate / Production: Normal/ Responsive      Volume:  Normal  volume  Orientation:  All  Mood:   Normal  Affect:   Appropriate   Thought Content: Clear   Insight:   Good       Plan/Need for Future Services:  Return for therapy in 1 week to treat diagnosed problems.    Patient has an initial individualized treatment plan that was created as part of their diagnostic assessment / admission process.  A master individualized treatment plan is in the process of being developed with the patient and multi-disciplinary care team.    Referral / Collaboration:  Referral to another professional/service is not indicated at this time..  Emergency Services Needed?  No    Assignment:  Coping and social support    Interactive Complexity:  There are four specific communication difficulties that complicate the work of the primary psychiatric procedure.  Interactive complexity (+49850) may be reported when at least one of these difficulties is  present.    Communication difficulties present during current the psychiatric procedure include:  1. None.      Signature/Title:      Mason Adkins, PhD, LP    [place] for Sexual and Gender Health, Sexual and Gender Health Clinic  Department of Family Medicine and Community Health  [facility]

## 2023-03-23 ENCOUNTER — VIRTUAL VISIT (OUTPATIENT)
Dept: PSYCHOLOGY | Facility: CLINIC | Age: 68
End: 2023-03-23
Payer: COMMERCIAL

## 2023-03-23 DIAGNOSIS — F43.23 ADJUSTMENT DISORDER WITH MIXED ANXIETY AND DEPRESSED MOOD: ICD-10-CM

## 2023-03-23 DIAGNOSIS — F43.9 TRAUMA AND STRESSOR-RELATED DISORDER: ICD-10-CM

## 2023-03-23 DIAGNOSIS — F41.9 ANXIETY DISORDER, UNSPECIFIED TYPE: ICD-10-CM

## 2023-03-23 DIAGNOSIS — F91.8 CONDUCT DISORDER, UNDIFFERENTIATED TYPE: Primary | ICD-10-CM

## 2023-03-23 PROCEDURE — 90853 GROUP PSYCHOTHERAPY: CPT | Mod: VID

## 2023-03-23 NOTE — GROUP NOTE
[facility]  [address], [address]  [place]  [address]    Group Progress Note  [facility] - Progress Note    Date of Service: 3/23/23   Name: Ramin Murcia  : 1955  Medical Record Number: [id number]  START TIME:  4:00 PM  END TIME:  5:50PM  FACILITATOR(S): Mason Adkins, PhD  TOPIC: [facility] Group Therapy  Type of Session: Group  :  Number of Attendees:  7  Number of Minutes:  110    SERVICE MODALITY:  Video Visit:      Provider verified identity through the following two step process.  Patient provided:  Patient is known previously to provider    Telemedicine Visit: The patient's condition can be safely assessed and treated via synchronous audio and visual telemedicine encounter.      Reason for Telemedicine Visit: Services only offered telehealth    Originating Site (Patient Location): Patient's home    Distant Site (Provider Location): [facility]: Sexual and Gender Health Clinic    Consent:  The patient/guardian has verbally consented to: the potential risks and benefits of telemedicine (video visit) versus in person care; bill my insurance or make self-payment for services provided; and responsibility for payment of non-covered services.     Patient would like the video invitation sent by:  Other e-mail: see chart    Mode of Communication:  Video Conference via Medical Zoom    Distant Location (Provider):  On-site    As the provider I attest to compliance with applicable laws and regulations related to telemedicine.      DSM-5 Diagnoses:  Encounter Diagnoses   Name Primary?     Other specified disruptive, impulse control, and conduct disorder (hypersexual disorder) Yes     Trauma and stressor-related disorder      Anxiety disorder, unspecified type      Adjustment disorder with mixed anxiety and depressed mood          Current Reported Symptoms and Status update:  Changes since last session include some urges to violate boundaries, no  boundary violations, some anxiety, working on trauma symptoms in therapy.    Patient's problems with out of control, driven, impulsive, compulsive sexual behavior began in early 2020. He has been in a stable loving marriage for 40 years then began having covert hook-ups with young men.  Efforts to change problematic behaviors have included several years of psychotherapy and attenance in [facility]. He continues to struggle with recurrent and intense urges which cause him distress.     Client has been struggling with symptoms of depression and anxiety secondary to custodial and changing roles. These symptoms have impacted his functioning and exacerbate his CSB concerns.    Client reported distress related to adverse childhood experiences that were traumatic. He attributes distress to ineffective coping, patterns of avoidance/numbing, and difficulties with functioning in relationships.      Progress Toward Treatment Goals:   Satisfactory progress     Therapeutic Interventions/Treatment Strategies:    Area(s) of treatment focus addressed in this session included Symptom Management, Interpersonal Relationship Skills and Sexual Health and Wellness    Group members introduced themselves to medical student Vilma Edwards, who observed with the group s consent. Group members checked in regarding treatment progress, boundaries, healthy sexuality, coping, and challenges. Client processed reactions he is having while working on exposure interventions. He also highlighted ways in which he is using his support system to stay within his boundaries. Client was responsive to feedback from others and provided input to others.   Psychotherapist offered support, feedback and validation and reinforced use of skills Treatment modalities used include [facility] THERAPY INTERVENTIONS: Cognitive Behavioral Therapy Interpersonal  Support, Redirection and Feedback    Patient Response:   Patient responded to session by accepting feedback, giving feedback,  listening, focusing on goals and accepting support  Possible barriers to participation / learning include: N/A    Current Mental Status Exam:   Appearance:  Appropriate   Eye Contact:  Good   Attitude / Demeanor: Cooperative   Speech      Rate / Production: Normal/ Responsive      Volume:  Normal  volume  Orientation:  All  Mood:   Normal  Affect:   Appropriate   Thought Content: Clear   Insight:   Good       Plan/Need for Future Services:  Return for therapy in 2 weeks to treat diagnosed problems.    Patient has a current master individualized treatment plan.  See Epic treatment plan for more information.    Referral / Collaboration:  Referral to another professional/service is not indicated at this time..  Emergency Services Needed?  No    Assignment:  Continue working on exposure interventions and engaging support network    Interactive Complexity:  There are four specific communication difficulties that complicate the work of the primary psychiatric procedure.  Interactive complexity (+[id number]) may be reported when at least one of these difficulties is present.    Communication difficulties present during current the psychiatric procedure include:  1. None.      Signature/Title:      Mason Adkins, PhD, LP    [place] for Sexual [facility] Gender [facility], Sexual and Gender Health Clinic  Department of Family Medicine and Community Health  [employer]

## 2023-04-03 ENCOUNTER — OFFICE VISIT (OUTPATIENT)
Dept: PSYCHOLOGY | Facility: CLINIC | Age: 68
End: 2023-04-03
Payer: COMMERCIAL

## 2023-04-03 DIAGNOSIS — F43.9 TRAUMA AND STRESSOR-RELATED DISORDER: Primary | ICD-10-CM

## 2023-04-03 DIAGNOSIS — F43.23 ADJUSTMENT DISORDER WITH MIXED ANXIETY AND DEPRESSED MOOD: ICD-10-CM

## 2023-04-03 DIAGNOSIS — F41.9 ANXIETY DISORDER, UNSPECIFIED TYPE: ICD-10-CM

## 2023-04-03 DIAGNOSIS — F91.8 CONDUCT DISORDER, UNDIFFERENTIATED TYPE: ICD-10-CM

## 2023-04-03 PROCEDURE — 90837 PSYTX W PT 60 MINUTES: CPT | Performed by: STUDENT IN AN ORGANIZED HEALTH CARE EDUCATION/TRAINING PROGRAM
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
## 2023-04-06 ENCOUNTER — VIRTUAL VISIT (OUTPATIENT)
Dept: PSYCHOLOGY | Facility: CLINIC | Age: 68
End: 2023-04-06
Payer: COMMERCIAL

## 2023-04-06 DIAGNOSIS — F43.23 ADJUSTMENT DISORDER WITH MIXED ANXIETY AND DEPRESSED MOOD: ICD-10-CM

## 2023-04-06 DIAGNOSIS — F43.9 TRAUMA AND STRESSOR-RELATED DISORDER: ICD-10-CM

## 2023-04-06 DIAGNOSIS — F91.8 CONDUCT DISORDER, UNDIFFERENTIATED TYPE: Primary | ICD-10-CM

## 2023-04-06 DIAGNOSIS — F41.9 ANXIETY DISORDER, UNSPECIFIED TYPE: ICD-10-CM

## 2023-04-06 PROCEDURE — 90853 GROUP PSYCHOTHERAPY: CPT | Mod: VID

## 2023-04-06 NOTE — GROUP NOTE
[facility]  [address], [address]  [place]  [address]    Group Progress Note  [facility] Clinic - Progress Note    Date of Service: 23   Name: Ramin Murcia  : 1955  Medical Record Number: [id number]  START TIME:  4:00 PM  END TIME:  5:00 PM  FACILITATOR(S): Mason Adkins, PhD  TOPIC: [facility] Group Therapy  Type of Session: Group  :  Number of Attendees:  3  Number of Minutes:  60    SERVICE MODALITY:  Video Visit:      Provider verified identity through the following two step process.  Patient provided:  Patient is known previously to provider    Telemedicine Visit: The patient's condition can be safely assessed and treated via synchronous audio and visual telemedicine encounter.      Reason for Telemedicine Visit: Services only offered telehealth    Originating Site (Patient Location): Patient's home    Distant Site (Provider Location): [facility]: Sexual and Gender Health Clinic    Consent:  The patient/guardian has verbally consented to: the potential risks and benefits of telemedicine (video visit) versus in person care; bill my insurance or make self-payment for services provided; and responsibility for payment of non-covered services.     Patient would like the video invitation sent by:  My Chart    Mode of Communication:  Video Conference via Amwell    Distant Location (Provider):  On-site    As the provider I attest to compliance with applicable laws and regulations related to telemedicine.      DSM-5 Diagnoses:  Encounter Diagnoses   Name Primary?     Other specified disruptive, impulse control, and conduct disorder (hypersexual disorder) Yes     Trauma and stressor-related disorder      Anxiety disorder, unspecified type      Adjustment disorder with mixed anxiety and depressed mood      Current Reported Symptoms and Status update:  Changes since last session include one boundary violation but able to stop, feeling improvement  in trauma related symptoms since completing written exposure therapy, and decreased anxiety.    Progress Toward Treatment Goals:   Satisfactory progress     Therapeutic Interventions/Treatment Strategies:    Area(s) of treatment focus addressed in this session included Symptom Management, Interpersonal Relationship Skills and Sexual Health and Wellness    CBT and Interpersonal approaches were implemented in support of group members  treatment. Members checked in about CSB-related boundaries, relationship/intimacy concerns, and homework completion. Group members shared progress in treatment. Client processed the completion of written exposure therapy. He shared how this intervention helped him identify negative core beliefs. He provided supportive feedback to others and was responsive to input from others.  Psychotherapist offered support, feedback and validation and reinforced use of skills Treatment modalities used include [facility] THERAPY INTERVENTIONS: Cognitive Behavioral Therapy Interpersonal  Support, Redirection and Feedback    Patient Response:   Patient responded to session by accepting feedback, giving feedback, listening, focusing on goals and accepting support  Possible barriers to participation / learning include: N/A    Current Mental Status Exam:   Appearance:  Appropriate   Eye Contact:  Good   Attitude / Demeanor: Cooperative   Speech      Rate / Production: Normal/ Responsive      Volume:  Normal  volume  Orientation:  All  Mood:   Normal  Affect:   Appropriate   Thought Content: Clear   Insight:   Good       Plan/Need for Future Services:  Return for therapy in 1 week to treat diagnosed problems.    Patient has a current master individualized treatment plan.  See Epic treatment plan for more information.    Referral / Collaboration:  Referral to another professional/service is not indicated at this time..  Emergency Services Needed?  No    Assignment:  Return to group next week    Interactive  Complexity:  There are four specific communication difficulties that complicate the work of the primary psychiatric procedure.  Interactive complexity (+21992) may be reported when at least one of these difficulties is present.    Communication difficulties present during current the psychiatric procedure include:  1. None.      Signature/Title:      Mason Adkins, PhD, LP    [place] for Sexual and Gender Health, Sexual and Gender Health Clinic  Department of Family Medicine and Community Health  [facility]

## 2023-04-07 DIAGNOSIS — F91.8 CONDUCT DISORDER, UNDIFFERENTIATED TYPE: ICD-10-CM

## 2023-04-07 NOTE — TELEPHONE ENCOUNTER
Requested Medication: Prozac 10mg  Dose: 20mg  Quantity: 60  Refills: 0    Take 2 (10mg) capsules daily    Last seen at [facility]: 3/2/2023 -   Next Appointment with Provider:  Visit date not found    Dior Mike CMA

## 2023-04-11 ENCOUNTER — OFFICE VISIT (OUTPATIENT)
Dept: PSYCHOLOGY | Facility: CLINIC | Age: 68
End: 2023-04-11
Payer: COMMERCIAL

## 2023-04-11 DIAGNOSIS — F41.9 ANXIETY DISORDER, UNSPECIFIED TYPE: ICD-10-CM

## 2023-04-11 DIAGNOSIS — F43.23 ADJUSTMENT DISORDER WITH MIXED ANXIETY AND DEPRESSED MOOD: ICD-10-CM

## 2023-04-11 DIAGNOSIS — F43.9 TRAUMA AND STRESSOR-RELATED DISORDER: ICD-10-CM

## 2023-04-11 DIAGNOSIS — F91.8 CONDUCT DISORDER, UNDIFFERENTIATED TYPE: Primary | ICD-10-CM

## 2023-04-11 PROCEDURE — 90837 PSYTX W PT 60 MINUTES: CPT | Performed by: STUDENT IN AN ORGANIZED HEALTH CARE EDUCATION/TRAINING PROGRAM

## 2023-04-11 RX ORDER — FLUOXETINE 10 MG/1
CAPSULE ORAL
Qty: 60 CAPSULE | Refills: 0 | Status: SHIPPED | OUTPATIENT
Start: 2023-04-11 | End: 2024-03-18
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
## 2023-04-13 ENCOUNTER — VIRTUAL VISIT (OUTPATIENT)
Dept: PSYCHOLOGY | Facility: CLINIC | Age: 68
End: 2023-04-13
Payer: COMMERCIAL

## 2023-04-13 DIAGNOSIS — F91.8 CONDUCT DISORDER, UNDIFFERENTIATED TYPE: Primary | ICD-10-CM

## 2023-04-13 DIAGNOSIS — F41.9 ANXIETY DISORDER, UNSPECIFIED TYPE: ICD-10-CM

## 2023-04-13 DIAGNOSIS — F43.23 ADJUSTMENT DISORDER WITH MIXED ANXIETY AND DEPRESSED MOOD: ICD-10-CM

## 2023-04-13 DIAGNOSIS — F43.9 TRAUMA AND STRESSOR-RELATED DISORDER: ICD-10-CM

## 2023-04-13 PROCEDURE — 90853 GROUP PSYCHOTHERAPY: CPT | Mod: VID
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
## 2023-04-18 ENCOUNTER — OFFICE VISIT (OUTPATIENT)
Dept: PSYCHOLOGY | Facility: CLINIC | Age: 68
End: 2023-04-18
Payer: COMMERCIAL

## 2023-04-18 DIAGNOSIS — F91.8 CONDUCT DISORDER, UNDIFFERENTIATED TYPE: Primary | ICD-10-CM

## 2023-04-18 DIAGNOSIS — F41.9 ANXIETY DISORDER, UNSPECIFIED TYPE: ICD-10-CM

## 2023-04-18 DIAGNOSIS — F43.23 ADJUSTMENT DISORDER WITH MIXED ANXIETY AND DEPRESSED MOOD: ICD-10-CM

## 2023-04-18 PROCEDURE — 90834 PSYTX W PT 45 MINUTES: CPT | Performed by: STUDENT IN AN ORGANIZED HEALTH CARE EDUCATION/TRAINING PROGRAM

## 2023-04-18 NOTE — PROGRESS NOTES
[place] [facility] Sexual [facility] Gender [facility] - Progress Note    Date of Service: 23   Name: Ramin Murcia  : 1955  Medical Record Number: [id number]  Treating Provider: Mason Adkins, PhD  Type of Session: Individual  Present in Session: Client and therapist  Session Start and Stop Time: 10:01m-10:53am  Number of Minutes:  52    SERVICE MODALITY:  In-person    DSM-5 Diagnoses:  Encounter Diagnoses   Name Primary?     Unspecified disruptive, impulse control, and conduct disorder (hypersexuality) Yes     Anxiety disorder, unspecified type      Adjustment disorder with mixed anxiety and depressed mood          Current Reported Symptoms and Status update:  Changes since last session include no boundary violations but some risk situations, some anxiety, starting to rebuild a positive sense of self.     Patient's problems with out of control, driven, impulsive, compulsive sexual behavior began in early . He has been in a stable loving marriage for 40 years then began having covert hook-ups with young men.  Efforts to change problematic behaviors have included several years of psychotherapy and attenance in [facility]. He continues to struggle with recurrent and intense urges which cause him distress.     Client has been struggling with symptoms of depression and anxiety secondary to senior living and changing roles. These symptoms have impacted his functioning and exacerbate his CSB concerns.    Client reported distress related to adverse childhood experiences that were traumatic. He attributes distress to ineffective coping, patterns of avoidance/numbing, and difficulties with functioning in relationships.      Progress Toward Treatment Goals:   Satisfactory progress     Transferred care from Dr. Bacon to Dr. Adkins 11/10/22  Working to understand antecedents, risks, and urges  Working to understand negative cycles  Collateral session with client's wife, Elizabeth 23  Started written exposure therapy for  trauma 2/27/23  Completed written exposure therapy 4/3/23. PCL scores went from 30 to 16 - significant reduction in symptoms.      Therapeutic Interventions/Treatment Strategies:    Area(s) of treatment focus addressed in this session included Symptom Management, Interpersonal Relationship Skills and Sexual Health and Wellness    Client processed recent CSB risk situations and ways in which he used mindfulness to stop boundary violations. Therapist reinforced use of skills.We collaboratively worked on client's conceptualization of his negative cycle and negative core beliefs. We discussed his perceived need for control and personality. Client described how his previous CSB behavior was out of control. Therapist challenged this and supported identify ways in which he was in control and how he can use this information to prevent future CSB behavior. We also discussed client's identity development. Therapist reflected that client defines his identity by his work/roles. Client assigned to journal about who he is. We planned for client's wife to attend next session to provide updates in client's treatment progress.    Psychotherapist offered support, feedback and validation and reinforced use of skills Treatment modalities used include Cognitive Behavioral Therapy Interpersonal  Support, Redirection, Feedback and Structured Activity    Patient Response:   Patient responded to session by accepting feedback, giving feedback, listening, focusing on goals and accepting support  Possible barriers to participation / learning include: N/A    Current Mental Status Exam:   Appearance:  Appropriate   Eye Contact:  Good   Attitude / Demeanor: Cooperative   Speech      Rate / Production: Normal/ Responsive      Volume:  Normal  volume  Orientation:  All  Mood:   Normal  Affect:   Appropriate   Thought Content: Clear   Insight:   Good       Plan/Need for Future Services:  Return for therapy in 2 weeks to treat diagnosed problems.     Patient has a current master individualized treatment plan.  See Epic treatment plan for more information.    Referral / Collaboration:  Referral to another professional/service is not indicated at this time..  Emergency Services Needed?  No    Assignment:  Journal - identity (how would you introduce yourself at a dinner party without reference to your job/roles).    Interactive Complexity:  There are four specific communication difficulties that complicate the work of the primary psychiatric procedure.  Interactive complexity (+76996) may be reported when at least one of these difficulties is present.    Communication difficulties present during current the psychiatric procedure include:  1. None.      Signature/Title:    Mason Adkins, PhD, LP    [place] [facility] Sexual [facility], Sexual and Gender Health Clinic  Department of Family Medicine and Community Health  [employer] [facility] [employer]

## 2023-04-20 ENCOUNTER — VIRTUAL VISIT (OUTPATIENT)
Dept: PSYCHOLOGY | Facility: CLINIC | Age: 68
End: 2023-04-20
Payer: COMMERCIAL

## 2023-04-20 DIAGNOSIS — F91.8 CONDUCT DISORDER, UNDIFFERENTIATED TYPE: Primary | ICD-10-CM

## 2023-04-20 DIAGNOSIS — F43.9 TRAUMA AND STRESSOR-RELATED DISORDER: ICD-10-CM

## 2023-04-20 DIAGNOSIS — F43.23 ADJUSTMENT DISORDER WITH MIXED ANXIETY AND DEPRESSED MOOD: ICD-10-CM

## 2023-04-20 DIAGNOSIS — F41.9 ANXIETY DISORDER, UNSPECIFIED TYPE: ICD-10-CM

## 2023-04-20 PROCEDURE — 90853 GROUP PSYCHOTHERAPY: CPT | Mod: VID

## 2023-04-20 NOTE — GROUP NOTE
[facility]  [address], [address]  [place]  [address]    Group Progress Note  [facility] - Progress Note    Date of Service: 23   Name: Ramin Murcia  : 1955  Medical Record Number: [id number]  START TIME:  4:00 PM  END TIME:  5:35pm  FACILITATOR(S): Mason Adkins, PhD  TOPIC: SGHC Group Therapy  Type of Session: Group  :  Number of Attendees:  5  Number of Minutes:  95    SERVICE MODALITY:  Video Visit:      Provider verified identity through the following two step process.  Patient provided:  Patient is known previously to provider    Telemedicine Visit: The patient's condition can be safely assessed and treated via synchronous audio and visual telemedicine encounter.      Reason for Telemedicine Visit: Services only offered telehealth    Originating Site (Patient Location): Patient's home    Distant Site (Provider Location): [facility] Clinics: Sexual and Gender Health Clinic    Consent:  The patient/guardian has verbally consented to: the potential risks and benefits of telemedicine (video visit) versus in person care; bill my insurance or make self-payment for services provided; and responsibility for payment of non-covered services.     Patient would like the video invitation sent by:  Other e-mail: See chart    Mode of Communication:  Video Conference via Medical Zoom    Distant Location (Provider):  On-site    As the provider I attest to compliance with applicable laws and regulations related to telemedicine.      DSM-5 Diagnoses:  Encounter Diagnoses   Name Primary?     Unspecified disruptive, impulse control, and conduct disorder (hypersexuality) Yes     Anxiety disorder, unspecified type      Adjustment disorder with mixed anxiety and depressed mood      Trauma and stressor-related disorder          Current Reported Symptoms and Status update:  Changes since last session include no boundary violations, healthy sexual activity  with his wife, finding fulfillment in other life roles, some anxiety, some risk situations.    Patient's problems with out of control, driven, impulsive, compulsive sexual behavior began in early 2020. He has been in a stable loving marriage for 40 years then began having covert hook-ups with young men.  Efforts to change problematic behaviors have included several years of psychotherapy and attenance in [facility]. He continues to struggle with recurrent and intense urges which cause him distress.     Client has been struggling with symptoms of depression and anxiety secondary to [facility] and changing roles. These symptoms have impacted his functioning and exacerbate his CSB concerns.    Client reported distress related to adverse childhood experiences that were traumatic. He attributes distress to ineffective coping, patterns of avoidance/numbing, and difficulties with functioning in relationships.      Progress Toward Treatment Goals:   Satisfactory progress     Therapeutic Interventions/Treatment Strategies:    Area(s) of treatment focus addressed in this session included Symptom Management and Interpersonal Relationship Skills    Medical student Bertha Patiño attended group with the group s permission. Cognitive Behavioral Therapy and Interpersonal interventions were implemented to facilitate the group therapy process. Group members checked in regarding boundaries, risk situations, self-care, and healthy sexual behavior. Group members provided updates to the treatment progress and homework completion. Client shared how he was involved in a risk situation but used mindfulness skills to interrupt the situation and change behavior. He reflected on progress he has made in treatment. Client provided supportive feedback to others and was responsive to feedback from others.  Psychotherapist offered support, feedback and validation and reinforced use of skills Treatment modalities used include [facility] THERAPY INTERVENTIONS:  Cognitive Behavioral Therapy Interpersonal  Support, Redirection and Problem Solving    Patient Response:   Patient responded to session by accepting feedback, giving feedback, listening, focusing on goals and accepting support  Possible barriers to participation / learning include: N/A    Current Mental Status Exam:   Appearance:  Appropriate   Eye Contact:  Good   Attitude / Demeanor: Cooperative   Speech      Rate / Production: Normal/ Responsive      Volume:  Normal  volume  Orientation:  All  Mood:   Normal  Affect:   Appropriate   Thought Content: Clear   Insight:   Good       Plan/Need for Future Services:  Return for therapy in 1 week to treat diagnosed problems.    Patient has a current master individualized treatment plan.  See Epic treatment plan for more information.    Referral / Collaboration:  Referral to another professional/service is not indicated at this time..  Emergency Services Needed?  No    Assignment:  Continue mindfulness and antecedents    Interactive Complexity:  There are four specific communication difficulties that complicate the work of the primary psychiatric procedure.  Interactive complexity (+09802) may be reported when at least one of these difficulties is present.    Communication difficulties present during current the psychiatric procedure include:  1. None.      Signature/Title:      Mason Adkins, PhD, LP    [place] for Sexual and Gender Health, Sexual and Gender Health Clinic  Department of Family Medicine and Community Health  [employer] [facility] [employer]

## 2023-04-27 ENCOUNTER — VIRTUAL VISIT (OUTPATIENT)
Dept: PSYCHOLOGY | Facility: CLINIC | Age: 68
End: 2023-04-27
Payer: COMMERCIAL

## 2023-04-27 DIAGNOSIS — F91.8 CONDUCT DISORDER, UNDIFFERENTIATED TYPE: Primary | ICD-10-CM

## 2023-04-27 DIAGNOSIS — F43.23 ADJUSTMENT DISORDER WITH MIXED ANXIETY AND DEPRESSED MOOD: ICD-10-CM

## 2023-04-27 DIAGNOSIS — F41.9 ANXIETY DISORDER, UNSPECIFIED TYPE: ICD-10-CM

## 2023-04-27 DIAGNOSIS — F43.9 TRAUMA AND STRESSOR-RELATED DISORDER: ICD-10-CM

## 2023-04-27 PROCEDURE — 90853 GROUP PSYCHOTHERAPY: CPT | Mod: VID

## 2023-04-27 NOTE — GROUP NOTE
[facility]  [address], [address]  [place]  [address]    Group Progress Note  [facility] Clinic - Progress Note    Date of Service: 23   Name: Ramin Murcia  : 1955  Medical Record Number: [id number]  START TIME:  4:00 PM  END TIME:  5:30 PM  FACILITATOR(S): Mason Adkins, PhD  TOPIC: SGHC Group Therapy  Type of Session: Group  :  Number of Attendees:  8  Number of Minutes:  90; client needed to leave early    SERVICE MODALITY:  Video Visit:      Provider verified identity through the following two step process.  Patient provided:  Patient is known previously to provider    Telemedicine Visit: The patient's condition can be safely assessed and treated via synchronous audio and visual telemedicine encounter.      Reason for Telemedicine Visit: Services only offered telehealth    Originating Site (Patient Location): Patient's home    Distant Site (Provider Location): [facility] Clinics: Sexual and Gender Health Client    Consent:  The patient/guardian has verbally consented to: the potential risks and benefits of telemedicine (video visit) versus in person care; bill my insurance or make self-payment for services provided; and responsibility for payment of non-covered services.     Patient would like the video invitation sent by:  Other e-mail: see chart    Mode of Communication:  Video Conference via Medical Zoom    Distant Location (Provider):  On-site    As the provider I attest to compliance with applicable laws and regulations related to telemedicine.    Medical student Bertha Patiño attended session with group's permission      DSM-5 Diagnoses:  Encounter Diagnoses   Name Primary?     Unspecified disruptive, impulse control, and conduct disorder (hypersexuality) Yes     Anxiety disorder, unspecified type      Adjustment disorder with mixed anxiety and depressed mood      Trauma and stressor-related disorder          Current Reported Symptoms  and Status update:  Changes since last session one boundary violation following feeling criticized. Improvement in trauma disorder and anxiety.    Patient's problems with out of control, driven, impulsive, compulsive sexual behavior began in early 2020. He has been in a stable loving marriage for 40 years then began having covert hook-ups with young men.  Efforts to change problematic behaviors have included several years of psychotherapy and attenance in [facility]. He continues to struggle with recurrent and intense urges which cause him distress.     Client has been struggling with symptoms of depression and anxiety secondary to assisted and changing roles. These symptoms have impacted his functioning and exacerbate his CSB concerns.    Client reported distress related to adverse childhood experiences that were traumatic. He attributes distress to ineffective coping, patterns of avoidance/numbing, and difficulties with functioning in relationships.      Progress Toward Treatment Goals:   Satisfactory progress     Therapeutic Interventions/Treatment Strategies:    Area(s) of treatment focus addressed in this session included Symptom Management, Interpersonal Relationship Skills and Sexual Health and Wellness    Cognitive Behavioral Therapy and Interpersonal Approaches were implemented in group therapy in support of helping clients  work towards treatment goals. Group members checked in about treatment progress, boundaries, risk situations, current stressors, and current effective coping and relationship skills. Members were supported to process emotional reactions to current stressors or conflicts. Client processed the meaning of honesty in relationships and how honesty plays into his recovery. He shared his tendency to be impulsively honest and the impacts on his marriage. He requested feedback from the group and provided input to others.  Psychotherapist offered support, feedback and validation and reinforced use of  skills Treatment modalities used include [facility] THERAPY INTERVENTIONS: Cognitive Behavioral Therapy Interpersonal  Support and Feedback    Patient Response:   Patient responded to session by accepting feedback, giving feedback, listening, focusing on goals and accepting support  Possible barriers to participation / learning include: N/A    Current Mental Status Exam:   Appearance:  Appropriate   Eye Contact:  Good   Attitude / Demeanor: Cooperative   Speech      Rate / Production: Normal/ Responsive      Volume:  Normal  volume  Orientation:  All  Mood:   Normal  Affect:   Appropriate   Thought Content: Clear   Insight:   Good       Plan/Need for Future Services:  Return for therapy in 2 weeks to treat diagnosed problems.    Patient has a current master individualized treatment plan.  See Epic treatment plan for more information.    Referral / Collaboration:  Referral to another professional/service is not indicated at this time..  Emergency Services Needed?  No    Assignment:  Continue working on cycles    Interactive Complexity:  There are four specific communication difficulties that complicate the work of the primary psychiatric procedure.  Interactive complexity (+[id number]) may be reported when at least one of these difficulties is present.    Communication difficulties present during current the psychiatric procedure include:  1. None.      Signature/Title:    Mason Adkins, PhD, LP    [place] for Sexual and Gender Health, Sexual and Gender Health Clinic  Department of Family Medicine and Community Health  [employer] [facility] [employer]

## 2023-05-01 ENCOUNTER — VIRTUAL VISIT (OUTPATIENT)
Dept: PSYCHOLOGY | Facility: CLINIC | Age: 68
End: 2023-05-01
Payer: COMMERCIAL

## 2023-05-01 DIAGNOSIS — F91.8 CONDUCT DISORDER, UNDIFFERENTIATED TYPE: Primary | ICD-10-CM

## 2023-05-01 DIAGNOSIS — F43.23 ADJUSTMENT DISORDER WITH MIXED ANXIETY AND DEPRESSED MOOD: ICD-10-CM

## 2023-05-01 DIAGNOSIS — F41.9 ANXIETY DISORDER, UNSPECIFIED TYPE: ICD-10-CM

## 2023-05-01 PROCEDURE — 90837 PSYTX W PT 60 MINUTES: CPT | Mod: VID | Performed by: STUDENT IN AN ORGANIZED HEALTH CARE EDUCATION/TRAINING PROGRAM

## 2023-05-01 NOTE — PROGRESS NOTES
[place] [facility] - Progress Note    Date of Service: 23   Name: Ramin Murcia  : 1955  Medical Record Number: [id number]  Treating Provider: Mason Adkins, PhD  Type of Session: Individual  Present in Session: Client, therapist, and client's wife Elizabeth  Session Start and Stop Time: 10:00am-10:55am  Number of Minutes:  55    SERVICE MODALITY:  Video Visit:      Provider verified identity through the following two step process.  Patient provided:  Patient is known previously to provider    Telemedicine Visit: The patient's condition can be safely assessed and treated via synchronous audio and visual telemedicine encounter.      Reason for Telemedicine Visit: Services only offered telehealth    Originating Site (Patient Location): Patient's home    Distant Site (Provider Location): [facility]    Consent:  The patient/guardian has verbally consented to: the potential risks and benefits of telemedicine (video visit) versus in person care; bill my insurance or make self-payment for services provided; and responsibility for payment of non-covered services.     Patient would like the video invitation sent by:  My Chart    Mode of Communication:  [facility] Conference [facility]    Distant Location (Provider):  On-site    As the provider I attest to compliance with applicable laws and regulations related to telemedicine.    DSM-5 Diagnoses:  Encounter Diagnoses   Name Primary?     Unspecified disruptive, impulse control, and conduct disorder (hypersexuality) Yes     Anxiety disorder, unspecified type      Adjustment disorder with mixed anxiety and depressed mood          Current Reported Symptoms and Status update:  Changes since last session include increased irritability, some anxiety, increased alcohol use, no boundary violations.    Progress Toward Treatment Goals:   Satisfactory progress        Transferred care from Dr. Bacon to Dr. Adkins  11/10/22  Working to understand antecedents, risks, and urges  Working to understand negative cycles  Collateral session with client's wife, Elizabeth 1/12/23  Started written exposure therapy for trauma 2/27/23  Completed written exposure therapy 4/3/23. PCL scores went from 30 to 16 - significant reduction in symptoms.  Collateral session with Elizabeth 5/1/23    Therapeutic Interventions/Treatment Strategies:    Area(s) of treatment focus addressed in this session included Symptom Management and Interpersonal Relationship Skills    Today's session was a collateral session with client's wife, Elizabeth. Therapist assessed client's treatment progress and ongoing concerns with Elizabeth, who shared concerns around client's increased alcohol use, depressive symptoms, and other compulsive behaviors (eating, head scratching). She also shared that client is reactive to some statements, particularly related to when client feels he is being controlled. Client and Elizabeth processed recent examples. Therapist supported client to connect these examples and his compulsive behaviors to his negative cycle and core fuels. We discussed client reducing/stopping alcohol use given concerns about increased quantity and potential medication interactions (Prozac). Client expressed intention to stop alcohol use. Client, therapist, and Elizabeth discussed referral to a couple's therapist at this clinic to address concerns about communication. Both expressed a desire to pursue couples therapy at this clinic. Therapist will coordinate care.    Psychotherapist offered support, feedback and validation and reinforced use of skills Treatment modalities used include Cognitive Behavioral Therapy Solution Focused Therapy Interpersonal  Support, Redirection and Feedback    Patient Response:   Patient responded to session by accepting feedback, giving feedback, listening, focusing on goals and accepting support  Possible barriers to participation /  learning include: N/A    Current Mental Status Exam:   Appearance:  Appropriate   Eye Contact:  Good   Attitude / Demeanor: Cooperative   Speech      Rate / Production: Normal/ Responsive      Volume:  Normal  volume  Orientation:  All  Mood:   Normal  Affect:   Appropriate   Thought Content: Clear   Insight:   Good       Plan/Need for Future Services:  Return for therapy in 2 weeks to treat diagnosed problems.    Patient has a current master individualized treatment plan.  See Epic treatment plan for more information.    Referral / Collaboration:  The following referral(s) will be initiated: couples therapy from a provider at this clinic.  Emergency Services Needed?  No    Assignment:  Continue working on identifying core fuels    Interactive Complexity:  There are four specific communication difficulties that complicate the work of the primary psychiatric procedure.  Interactive complexity (+39942) may be reported when at least one of these difficulties is present.    Communication difficulties present during current the psychiatric procedure include:  1. None.      Signature/Title:    Mason Adkins, PhD, LP    [place] [facility], Sexual and [facility] Health Clinic  Department of Family Medicine and Community Health  [employer]

## 2023-05-01 NOTE — NURSING NOTE
Is the patient currently in the state of [place]? YES    Visit mode:VIDEO    If the visit is dropped, the patient can be reconnected by: VIDEO VISIT: Text to cell phone:   Telephone Information:   Mobile [phone]       Will anyone else be joining the visit? No  (If patient encounters technical issues they should call [phone])    How would you like to obtain your AVS? MyChart    Are changes needed to the allergy or medication list? N/A    Rooming Documentation: Questionnaire(s) not done per department protocol    Reason for visit: Video Visit     SAMREEN Gill

## 2023-05-16 ENCOUNTER — OFFICE VISIT (OUTPATIENT)
Dept: PSYCHOLOGY | Facility: CLINIC | Age: 68
End: 2023-05-16
Payer: COMMERCIAL

## 2023-05-16 DIAGNOSIS — F41.9 ANXIETY DISORDER, UNSPECIFIED TYPE: ICD-10-CM

## 2023-05-16 DIAGNOSIS — F91.8 CONDUCT DISORDER, UNDIFFERENTIATED TYPE: Primary | ICD-10-CM

## 2023-05-16 DIAGNOSIS — F43.23 ADJUSTMENT DISORDER WITH MIXED ANXIETY AND DEPRESSED MOOD: ICD-10-CM

## 2023-05-16 PROCEDURE — 90837 PSYTX W PT 60 MINUTES: CPT | Performed by: STUDENT IN AN ORGANIZED HEALTH CARE EDUCATION/TRAINING PROGRAM

## 2023-05-16 NOTE — PROGRESS NOTES
[place] [facility] - Progress Note    Date of Service: 23   Name: Ramin Murcia  : 1955  Medical Record Number: [id number]  Treating Provider: Mason Adkins, PhD  Type of Session: Individual  Present in Session: Client and therapist  Session Start and Stop Time: 11:00am-12:00pm  Number of Minutes:  60    SERVICE MODALITY:  In-person    DSM-5 Diagnoses:  Encounter Diagnoses   Name Primary?     Unspecified disruptive, impulse control, and conduct disorder (hypersexuality) Yes     Anxiety disorder, unspecified type      Adjustment disorder with mixed anxiety and depressed mood          Current Reported Symptoms and Status update:  Changes since last session include no boundary violations, some anxiety, some conflict with his wife.    Patient's problems with out of control, driven, impulsive, compulsive sexual behavior began in early . He has been in a stable loving marriage for 40 years then began having covert hook-ups with young men.  Efforts to change problematic behaviors have included several years of psychotherapy and attenance in [facility]. He continues to struggle with recurrent and intense urges which cause him distress.     Client has been struggling with symptoms of depression and anxiety secondary to half-way and changing roles. These symptoms have impacted his functioning and exacerbate his CSB concerns.    Client reported distress related to adverse childhood experiences that were traumatic. He attributes distress to ineffective coping, patterns of avoidance/numbing, and difficulties with functioning in relationships.        Progress Toward Treatment Goals:   Satisfactory progress     Transferred care from Dr. Bacon to Dr. Adkins 11/10/22  Working to understand antecedents, risks, and urges  Working to understand negative cycles  Collateral session with client's wife, Elizabeth 23  Started written exposure therapy for trauma 23  Completed written exposure  therapy 4/3/23. PCL scores went from 30 to 16 - significant reduction in symptoms.  Collateral session with Elizabeth 5/1/23    Therapeutic Interventions/Treatment Strategies:    Area(s) of treatment focus addressed in this session included Symptom Management, Interpersonal Relationship Skills and Sexual Health and Wellness    Client debriefed on his previous collateral session with his wife. He identified feeling some activation of negative core beliefs following that session. He also agreed with his wife's concerns about other compulsive behaviors (e.g., alcohol use). Client reported he has reduced his alcohol use subsequent to the collateral session. Client processed his history of CSB and how behaviors fit into his negative cycle. We reviewed core beliefs related to his cycles and CSB history. Client processed feelings shame, sadness, fear, and loss related to his CSB history.    Psychotherapist offered support, feedback and validation and reinforced use of skills Treatment modalities used include Cognitive Behavioral Therapy Interpersonal  Support, Redirection and Feedback    Patient Response:   Patient responded to session by accepting feedback, giving feedback, listening, focusing on goals and accepting support  Possible barriers to participation / learning include: N/A    Current Mental Status Exam:   Appearance:  Appropriate   Eye Contact:  Good   Attitude / Demeanor: Cooperative   Speech      Rate / Production: Normal/ Responsive      Volume:  Normal  volume  Orientation:  All  Mood:   Normal  Affect:   Appropriate   Thought Content: Clear   Insight:   Good       Plan/Need for Future Services:  Return for therapy in 2 weeks to treat diagnosed problems.    Patient has a current master individualized treatment plan.  See Epic treatment plan for more information.    Referral / Collaboration:  Referral to another professional/service is not indicated at this time..  Emergency Services  Needed?  No    Assignment:  Work on cycles    Interactive Complexity:  There are four specific communication difficulties that complicate the work of the primary psychiatric procedure.  Interactive complexity (+05887) may be reported when at least one of these difficulties is present.    Communication difficulties present during current the psychiatric procedure include:  1. None.      Signature/Title:      Mason Adkins, PhD, LP    [place] [facility], Sexual and [facility] Health Clinic  Department of Family Medicine and Community Health  [facility]

## 2023-05-18 ENCOUNTER — VIRTUAL VISIT (OUTPATIENT)
Dept: PSYCHOLOGY | Facility: CLINIC | Age: 68
End: 2023-05-18
Payer: COMMERCIAL

## 2023-05-18 DIAGNOSIS — F43.23 ADJUSTMENT DISORDER WITH MIXED ANXIETY AND DEPRESSED MOOD: ICD-10-CM

## 2023-05-18 DIAGNOSIS — F43.9 TRAUMA AND STRESSOR-RELATED DISORDER: ICD-10-CM

## 2023-05-18 DIAGNOSIS — F91.8 CONDUCT DISORDER, UNDIFFERENTIATED TYPE: Primary | ICD-10-CM

## 2023-05-18 DIAGNOSIS — F41.9 ANXIETY DISORDER, UNSPECIFIED TYPE: ICD-10-CM

## 2023-05-18 PROCEDURE — 90853 GROUP PSYCHOTHERAPY: CPT | Mod: VID | Performed by: STUDENT IN AN ORGANIZED HEALTH CARE EDUCATION/TRAINING PROGRAM

## 2023-05-18 NOTE — GROUP NOTE
[facility] Clinic  [address], [address]  [place]  [address]    Group Progress Note  [facility] - Progress Note    Date of Service: 23   Name: Ramin Murcia  : 1955  Medical Record Number: [id number]  START TIME:  4:00 PM  END TIME:  6:00 PM  FACILITATOR(S): Mason Adkins, PhD  TOPIC: [facility] Group Therapy  Type of Session: Group  :  Number of Attendees:  8  Number of Minutes:  120    SERVICE MODALITY:  Video Visit:      Provider verified identity through the following two step process.  Patient provided:  Patient is known previously to provider    Telemedicine Visit: The patient's condition can be safely assessed and treated via synchronous audio and visual telemedicine encounter.      Reason for Telemedicine Visit: Services only offered telehealth    Originating Site (Patient Location): Patient's home    Distant Site (Provider Location): [facility]: Sexual and Gender Health Clinic    Consent:  The patient/guardian has verbally consented to: the potential risks and benefits of telemedicine (video visit) versus in person care; bill my insurance or make self-payment for services provided; and responsibility for payment of non-covered services.     Patient would like the video invitation sent by:  Other e-mail: see chart    Mode of Communication:  Video Conference via Medical Zoom    Distant Location (Provider):  On-site    As the provider I attest to compliance with applicable laws and regulations related to telemedicine.      DSM-5 Diagnoses:  Encounter Diagnoses   Name Primary?     Unspecified disruptive, impulse control, and conduct disorder (hypersexuality) Yes     Anxiety disorder, unspecified type      Adjustment disorder with mixed anxiety and depressed mood      Trauma and stressor-related disorder          Current Reported Symptoms and Status update:  Changes since last session include no boundary violations, continued alcohol  "use, some conflict with wife, improved depression and anxiety.    Patient's problems with out of control, driven, impulsive, compulsive sexual behavior began in early 2020. He has been in a stable loving marriage for 40 years then began having covert hook-ups with young men.  Efforts to change problematic behaviors have included several years of psychotherapy and attenance in [facility]. He continues to struggle with recurrent and intense urges which cause him distress.     Client has been struggling with symptoms of depression and anxiety secondary to care home and changing roles. These symptoms have impacted his functioning and exacerbate his CSB concerns.    Client reported distress related to adverse childhood experiences that were traumatic. He attributes distress to ineffective coping, patterns of avoidance/numbing, and difficulties with functioning in relationships.      Progress Toward Treatment Goals:   Satisfactory progress     Therapeutic Interventions/Treatment Strategies:    Area(s) of treatment focus addressed in this session included Symptom Management, Interpersonal Relationship Skills and Sexual Health and Wellness    Medical student Tennille Lyles attended group with clients  permission. Group members check in about treatment progress, boundaries, healthy sexuality and relationships, and other mental health concerns. CBT and Interpersonal approaches were implemented in support of group treatment. Client shared work he is making on understanding negative core beliefs, antecedents, and his negative cycle. He also shared that his wife is \"policing\" him less, and that they are working on building trust. Client provided supportive feedback to others and was responsive to input from others.  Psychotherapist offered support, feedback and validation and reinforced use of skills Treatment modalities used include [facility] THERAPY INTERVENTIONS: Cognitive Behavioral Therapy Interpersonal  Support and Feedback    Patient " Response:   Patient responded to session by accepting feedback, giving feedback, listening, focusing on goals and accepting support  Possible barriers to participation / learning include: N/A    Current Mental Status Exam:   Appearance:  Appropriate   Eye Contact:  Good   Attitude / Demeanor: Cooperative   Speech      Rate / Production: Normal/ Responsive      Volume:  Normal  volume  Orientation:  All  Mood:   Normal  Affect:   Appropriate   Thought Content: Clear   Insight:   Good       Plan/Need for Future Services:  Return for therapy in 1 week to treat diagnosed problems.    Patient has a current master individualized treatment plan.  See Epic treatment plan for more information.    Referral / Collaboration:  Referral to another professional/service is not indicated at this time..  Emergency Services Needed?  No    Assignment:  Continue working on cycles    Interactive Complexity:  There are four specific communication difficulties that complicate the work of the primary psychiatric procedure.  Interactive complexity (+23314) may be reported when at least one of these difficulties is present.    Communication difficulties present during current the psychiatric procedure include:  1. None.      Signature/Title:    Mason Adkins, PhD, LP    [place] [facility] Sexual [facility], Sexual and Gender Health Clinic  Department of Family Medicine and Community Health  [facility] [employer]

## 2023-05-19 ENCOUNTER — TELEPHONE (OUTPATIENT)
Dept: FAMILY MEDICINE | Facility: CLINIC | Age: 68
End: 2023-05-19
Payer: COMMERCIAL

## 2023-05-19 NOTE — TELEPHONE ENCOUNTER
Received refill request for patients Prozac which had been prescribed by Quan Richards PA-C.  Provider no longer in office.  Patient notified that they need to either establish care with another psychiatrist or see their primary care provider.    Dior Mike CMA

## 2023-05-25 ENCOUNTER — VIRTUAL VISIT (OUTPATIENT)
Dept: PSYCHOLOGY | Facility: CLINIC | Age: 68
End: 2023-05-25
Payer: COMMERCIAL

## 2023-05-25 DIAGNOSIS — F43.23 ADJUSTMENT DISORDER WITH MIXED ANXIETY AND DEPRESSED MOOD: ICD-10-CM

## 2023-05-25 DIAGNOSIS — F91.8 CONDUCT DISORDER, UNDIFFERENTIATED TYPE: Primary | ICD-10-CM

## 2023-05-25 DIAGNOSIS — F41.9 ANXIETY DISORDER, UNSPECIFIED TYPE: ICD-10-CM

## 2023-05-25 DIAGNOSIS — F43.9 TRAUMA AND STRESSOR-RELATED DISORDER: ICD-10-CM

## 2023-05-25 PROCEDURE — 90853 GROUP PSYCHOTHERAPY: CPT | Mod: VID | Performed by: STUDENT IN AN ORGANIZED HEALTH CARE EDUCATION/TRAINING PROGRAM

## 2023-05-25 NOTE — GROUP NOTE
[facility] Clinic  [address], [address]  [place]  [address]    Group Progress Note  [facility] - Progress Note    Date of Service: 23   Name: Ramin Murcia  : 1955  Medical Record Number: [id number]  START TIME:  4:00 PM  END TIME:  6:00 PM  FACILITATOR(S): Mason Adkins, PhD  TOPIC: [facility] Group Therapy  Type of Session: Group  :  Number of Attendees:  6  Number of Minutes:  120    SERVICE MODALITY:  Video Visit:      Provider verified identity through the following two step process.  Patient provided:  Patient is known previously to provider    Telemedicine Visit: The patient's condition can be safely assessed and treated via synchronous audio and visual telemedicine encounter.      Reason for Telemedicine Visit: Services only offered telehealth    Originating Site (Patient Location): Patient's home    Distant Site (Provider Location): [facility]: Sexual and Gender Health Clinic    Consent:  The patient/guardian has verbally consented to: the potential risks and benefits of telemedicine (video visit) versus in person care; bill my insurance or make self-payment for services provided; and responsibility for payment of non-covered services.     Patient would like the video invitation sent by:  Other e-mail: see chart    Mode of Communication:  Video Conference via Medical Zoom    Distant Location (Provider):  On-site    As the provider I attest to compliance with applicable laws and regulations related to telemedicine.    Medical student Tennille Lyles observed with group consent      DSM-5 Diagnoses:  Encounter Diagnoses   Name Primary?     Unspecified disruptive, impulse control, and conduct disorder (hypersexuality) Yes     Anxiety disorder, unspecified type      Adjustment disorder with mixed anxiety and depressed mood      Trauma and stressor-related disorder          Current Reported Symptoms and Status update:  Changes since last  session include no boundary violations but some urges, improved mood and anxiety, improvement in communication with wife.    Patient's problems with out of control, driven, impulsive, compulsive sexual behavior began in early 2020. He has been in a stable loving marriage for 40 years then began having covert hook-ups with young men.  Efforts to change problematic behaviors have included several years of psychotherapy and attenance in [facility]. He continues to struggle with recurrent and intense urges which cause him distress.     Client has been struggling with symptoms of depression and anxiety secondary to long-term and changing roles. These symptoms have impacted his functioning and exacerbate his CSB concerns.    Client reported distress related to adverse childhood experiences that were traumatic. He attributes distress to ineffective coping, patterns of avoidance/numbing, and difficulties with functioning in relationships.      Progress Toward Treatment Goals:   Satisfactory progress     Therapeutic Interventions/Treatment Strategies:    Area(s) of treatment focus addressed in this session included Symptom Management, Interpersonal Relationship Skills and Sexual Health and Wellness    Cognitive Behavioral Therapy and Interpersonal Interventions were implemented in support of group members  treatment goals. Members checked in about boundaries, negative cycle, positive cycle, and treatment progress. Client shared progress he has made in understanding his negative cycle thoughts/feelings/behaviors. He also identified risk situations that contribute to his cycles. He provided supportive feedback to others and was an active participant in group.    Psychotherapist offered support, feedback and validation and provided redirection Treatment modalities used include [facility] THERAPY INTERVENTIONS: Cognitive Behavioral Therapy Interpersonal  Support and Feedback    Patient Response:   Patient responded to session by accepting  feedback, giving feedback, listening, focusing on goals and accepting support  Possible barriers to participation / learning include: N/A    Current Mental Status Exam:   Appearance:  Appropriate   Eye Contact:  Good   Attitude / Demeanor: Cooperative   Speech      Rate / Production: Normal/ Responsive      Volume:  Normal  volume  Orientation:  All  Mood:   Normal  Affect:   Appropriate   Thought Content: Clear   Insight:   Good       Plan/Need for Future Services:  Return for therapy in 1 week to treat diagnosed problems.    Patient has a current master individualized treatment plan.  See Epic treatment plan for more information.    Referral / Collaboration:  Referral to another professional/service is not indicated at this time..  Emergency Services Needed?  No    Assignment:  Continue working on cycles    Interactive Complexity:  There are four specific communication difficulties that complicate the work of the primary psychiatric procedure.  Interactive complexity (+91904) may be reported when at least one of these difficulties is present.    Communication difficulties present during current the psychiatric procedure include:  1. None.      Signature/Title:    Mason Adkins, PhD, LP    [place] [facility], Sexual and Gender Health Clinic  Department of Family Medicine and Community Health  [employer] [facility] [employer]

## 2023-05-30 ENCOUNTER — OFFICE VISIT (OUTPATIENT)
Dept: PSYCHOLOGY | Facility: CLINIC | Age: 68
End: 2023-05-30
Payer: COMMERCIAL

## 2023-05-30 DIAGNOSIS — F43.9 TRAUMA AND STRESSOR-RELATED DISORDER: ICD-10-CM

## 2023-05-30 DIAGNOSIS — F41.9 ANXIETY DISORDER, UNSPECIFIED TYPE: ICD-10-CM

## 2023-05-30 DIAGNOSIS — F91.8 CONDUCT DISORDER, UNDIFFERENTIATED TYPE: Primary | ICD-10-CM

## 2023-05-30 DIAGNOSIS — F43.23 ADJUSTMENT DISORDER WITH MIXED ANXIETY AND DEPRESSED MOOD: ICD-10-CM

## 2023-05-30 PROCEDURE — 90837 PSYTX W PT 60 MINUTES: CPT | Performed by: STUDENT IN AN ORGANIZED HEALTH CARE EDUCATION/TRAINING PROGRAM

## 2023-05-30 NOTE — PROGRESS NOTES
[facility] - Progress Note    Date of Service: 23   Name: Ramin Murcia  : 1955  Medical Record Number: [id number]  Treating Provider: Mason Adkins, PhD  Type of Session: Individual  Present in Session: Client and therapist  Session Start and Stop Time: 11:15am-12:10pm; client was originally scheduled for 1:00 but arrived earlier.  Number of Minutes:  55    SERVICE MODALITY:  In-person    DSM-5 Diagnoses:  Encounter Diagnoses   Name Primary?     Unspecified disruptive, impulse control, and conduct disorder (hypersexuality) Yes     Anxiety disorder, unspecified type      Adjustment disorder with mixed anxiety and depressed mood      Trauma and stressor-related disorder          Current Reported Symptoms and Status update:  Changes since last session include some conflict with his wife, planning to start couples therapy, improved mood, some concerns with orgasm functioning, no boundary violations.    Patient's problems with out of control, driven, impulsive, compulsive sexual behavior began in early . He has been in a stable loving marriage for 40 years then began having covert hook-ups with young men.  Efforts to change problematic behaviors have included several years of psychotherapy and attenance in [facility]. He continues to struggle with recurrent and intense urges which cause him distress.     Client has been struggling with symptoms of depression and anxiety secondary to jail and changing roles. These symptoms have impacted his functioning and exacerbate his CSB concerns.    Client reported distress related to adverse childhood experiences that were traumatic. He attributes distress to ineffective coping, patterns of avoidance/numbing, and difficulties with functioning in relationships.      Progress Toward Treatment Goals:   Satisfactory progress     Transferred care from Dr. Bacon to Dr. Adkins 11/10/22  Working to understand antecedents, risks, and  urges  Working to understand negative cycles  Collateral session with client's wife, Elizabeth 1/12/23  Started written exposure therapy for trauma 2/27/23  Completed written exposure therapy 4/3/23. PCL scores went from 30 to 16 - significant reduction in symptoms.  Collateral session with Elizabeth 5/1/23    Therapeutic Interventions/Treatment Strategies:    Area(s) of treatment focus addressed in this session included Symptom Management, Interpersonal Relationship Skills and Sexual Health and Wellness    Client processed some high-risk situations and ways in which he is making decisions in line with his values and relationship goals. He reported no boundary violations. Client shared concerns about sexual (orgasm) functioning. We assessed current symptoms, with client noting having delayed ejaculation or no ejaculation with orgasm starting when he started fluoxetine. We noted this only occurs during sex with his wife. Client reflected on stress/conflict in their marriage that may contribute to problems with sexual functioning. He also discussed recent conversations he has had with his wife around sexual desire and connection. Client noted that he does not feel his wife necessarily desires sex but is willing to have it with him, and that this has been in his head a lot. Therapist and client explored if this is the type of sex he wants to have. Client reported a desire for high frequency over quality. Encouraged client to discuss this with his wife, especially in couples therapy with Dr. Gordillo at this clinic. We also discussed non-sexual ways of increasing intimacy.    Psychotherapist offered support, feedback and validation and reinforced use of skills Treatment modalities used include Cognitive Behavioral Therapy Interpersonal  Support, Feedback, Clarification and Education    Patient Response:   Patient responded to session by accepting feedback, giving feedback, listening, focusing on goals and accepting  support  Possible barriers to participation / learning include: N/A    Current Mental Status Exam:   Appearance:  Appropriate   Eye Contact:  Good   Attitude / Demeanor: Cooperative   Speech      Rate / Production: Normal/ Responsive      Volume:  Normal  volume  Orientation:  All  Mood:   Normal  Affect:   Appropriate   Thought Content: Clear   Insight:   Good       Plan/Need for Future Services:  Return for therapy in 2 weeks to treat diagnosed problems.    Patient has a current master individualized treatment plan.  See Epic treatment plan for more information.    Referral / Collaboration:  Referral to another professional/service is not indicated at this time..  Emergency Services Needed?  No    Assignment:  Non-sexual intimacy and communication with [place] about sex/intimacy    Interactive Complexity:  There are four specific communication difficulties that complicate the work of the primary psychiatric procedure.  Interactive complexity (+69668) may be reported when at least one of these difficulties is present.    Communication difficulties present during current the psychiatric procedure include:  1. None.      Signature/Title:    Mason Adkins, PhD, LP    [facility], Sexual and Gender Health Clinic  Department of Family Medicine and Community Health  [employer] [facility] [employer] [facility] [employer]

## 2023-06-01 ENCOUNTER — VIRTUAL VISIT (OUTPATIENT)
Dept: PSYCHOLOGY | Facility: CLINIC | Age: 68
End: 2023-06-01
Payer: COMMERCIAL

## 2023-06-01 DIAGNOSIS — F43.9 TRAUMA AND STRESSOR-RELATED DISORDER: ICD-10-CM

## 2023-06-01 DIAGNOSIS — F41.9 ANXIETY DISORDER, UNSPECIFIED TYPE: ICD-10-CM

## 2023-06-01 DIAGNOSIS — F91.8 CONDUCT DISORDER, UNDIFFERENTIATED TYPE: Primary | ICD-10-CM

## 2023-06-01 DIAGNOSIS — F43.23 ADJUSTMENT DISORDER WITH MIXED ANXIETY AND DEPRESSED MOOD: ICD-10-CM

## 2023-06-01 PROCEDURE — 90853 GROUP PSYCHOTHERAPY: CPT | Mod: VID | Performed by: STUDENT IN AN ORGANIZED HEALTH CARE EDUCATION/TRAINING PROGRAM

## 2023-06-01 NOTE — GROUP NOTE
Gender and Sexual Health Clinic  [address], [address]  [place]  [address]    Group Progress Note  [facility] Clinic - Progress Note    Date of Service: 23   Name: Ramin Murcia  : 1955  Medical Record Number: [id number]  START TIME:  4:00 PM  END TIME:  5:55 PM  FACILITATOR(S): Mason Adkins, PhD  TOPIC: SGHC Group Therapy  Type of Session: Group  :  Number of Attendees:  7  Number of Minutes:  115    SERVICE MODALITY:  Video Visit:      Provider verified identity through the following two step process.  Patient provided:  Patient is known previously to provider    Telemedicine Visit: The patient's condition can be safely assessed and treated via synchronous audio and visual telemedicine encounter.      Reason for Telemedicine Visit: Services only offered telehealth    Originating Site (Patient Location): Patient's home    Distant Site (Provider Location): [facility] Clinics: Sexual and Gender Health Clinic    Consent:  The patient/guardian has verbally consented to: the potential risks and benefits of telemedicine (video visit) versus in person care; bill my insurance or make self-payment for services provided; and responsibility for payment of non-covered services.     Patient would like the video invitation sent by:  Other e-mail: see chart    Mode of Communication:  Video Conference via Medical Zoom    Distant Location (Provider):  On-site    As the provider I attest to compliance with applicable laws and regulations related to telemedicine.      DSM-5 Diagnoses:  Encounter Diagnoses   Name Primary?     Unspecified disruptive, impulse control, and conduct disorder (hypersexuality) Yes     Anxiety disorder, unspecified type      Adjustment disorder with mixed anxiety and depressed mood      Trauma and stressor-related disorder          Current Reported Symptoms and Status update:  Changes since last session include no boundary violations, some urges to violate  boundaries, some conflict with his wife, improvement in mood and anxiety.    Patient's problems with out of control, driven, impulsive, compulsive sexual behavior began in early 2020. He has been in a stable loving marriage for 40 years then began having covert hook-ups with young men.  Efforts to change problematic behaviors have included several years of psychotherapy and attenance in [facility]. He continues to struggle with recurrent and intense urges which cause him distress.     Client has been struggling with symptoms of depression and anxiety secondary to senior care and changing roles. These symptoms have impacted his functioning and exacerbate his CSB concerns.    Client reported distress related to adverse childhood experiences that were traumatic. He attributes distress to ineffective coping, patterns of avoidance/numbing, and difficulties with functioning in relationships.      Progress Toward Treatment Goals:   Satisfactory progress     Therapeutic Interventions/Treatment Strategies:    Area(s) of treatment focus addressed in this session included Symptom Management, Interpersonal Relationship Skills and Sexual Health and Wellness    CBT and Interpersonal Interventions were implemented in support of group members  treatment progress. Group members shared how they are maintaining boundaries, working on identifying cycles and staying in positive cycles, and intimacy and relationship concerns. Group members supported each other to address issues with shame and difficult emotions. Client shared progress he has made on conceptualizing his positive cycle. He also reflected on his supports to stay within his positive cycle and how this has helped him. Client was supportive of other group members and provided feedback to others.  Psychotherapist offered support, feedback and validation and reinforced use of skills Treatment modalities used include [facility] THERAPY INTERVENTIONS: Cognitive Behavioral Therapy  Interpersonal  Support and Feedback    Patient Response:   Patient responded to session by accepting feedback, giving feedback, listening, focusing on goals and accepting support  Possible barriers to participation / learning include: N/A    Current Mental Status Exam:   Appearance:  Appropriate   Eye Contact:  Good   Attitude / Demeanor: Cooperative   Speech      Rate / Production: Normal/ Responsive      Volume:  Normal  volume  Orientation:  All  Mood:   Normal  Affect:   Appropriate   Thought Content: Clear   Insight:   Good       Plan/Need for Future Services:  Return for therapy in 1 week to treat diagnosed problems.    Patient has a current master individualized treatment plan.  See Epic treatment plan for more information.    Referral / Collaboration:  Referral to another professional/service is not indicated at this time..  Emergency Services Needed?  No    Assignment:  Continue working on cycles    Interactive Complexity:  There are four specific communication difficulties that complicate the work of the primary psychiatric procedure.  Interactive complexity (+[id number]) may be reported when at least one of these difficulties is present.    Communication difficulties present during current the psychiatric procedure include:  1. None.      Signature/Title:      Mason Adkins, PhD, LP    [facility] [facility], Sexual and Gender Health Clinic  Department of Family Medicine and Community Health  [employer] [employer]

## 2023-06-08 ENCOUNTER — VIRTUAL VISIT (OUTPATIENT)
Dept: PSYCHOLOGY | Facility: CLINIC | Age: 68
End: 2023-06-08
Payer: COMMERCIAL

## 2023-06-08 DIAGNOSIS — F41.9 ANXIETY DISORDER, UNSPECIFIED TYPE: ICD-10-CM

## 2023-06-08 DIAGNOSIS — F43.9 TRAUMA AND STRESSOR-RELATED DISORDER: ICD-10-CM

## 2023-06-08 DIAGNOSIS — F91.8 CONDUCT DISORDER, UNDIFFERENTIATED TYPE: Primary | ICD-10-CM

## 2023-06-08 PROCEDURE — 90853 GROUP PSYCHOTHERAPY: CPT | Mod: VID | Performed by: STUDENT IN AN ORGANIZED HEALTH CARE EDUCATION/TRAINING PROGRAM

## 2023-06-08 NOTE — GROUP NOTE
Gender and Sexual Health Clinic  [address], [address]    [address]    Group Progress Note  Gender and Sexual Health Clinic - Progress Note    Date of Service: 23   Name: Ramin Murcia  : 1955  Medical Record Number: [id number]  START TIME:  4:00 PM  END TIME:  6:00 PM  FACILITATOR(S): Mason Adkins, PhD  TOPIC: SGHC Group Therapy  Type of Session: Group  :  Number of Attendees:  5  Number of Minutes:  120    SERVICE MODALITY:  Video Visit:      Provider verified identity through the following process.  Patient provided:  Patient is known previously to provider    Telemedicine Visit: The patient's condition can be safely assessed and treated via synchronous audio and visual telemedicine encounter.      Reason for Telemedicine Visit: Services only offered telehealth    Originating Site (Patient Location): Patient's home    Distant Site (Provider Location): [facility] Clinics: Sexual and Gender Health Clinics    Consent:  The patient/guardian has verbally consented to: the potential risks and benefits of telemedicine (video visit) versus in person care; bill my insurance or make self-payment for services provided; and responsibility for payment of non-covered services.     Patient would like the video invitation sent by:  Other e-mail: see chart    Mode of Communication:  Video Conference via Medical Zoom    Distant Location (Provider):  On-site    As the provider I attest to compliance with applicable laws and regulations related to telemedicine.      DSM-5 Diagnoses:  Encounter Diagnoses   Name Primary?     Unspecified disruptive, impulse control, and conduct disorder (hypersexuality) Yes     Anxiety disorder, unspecified type      Trauma and stressor-related disorder          Current Reported Symptoms and Status update:  Changes since last session include no boundary violations, improvement in anxiety, improvement in trauma symptoms, reduction in alcohol  use.    Patient's problems with out of control, driven, impulsive, compulsive sexual behavior began in early 2020. He has been in a stable loving marriage for 40 years then began having covert hook-ups with young men.  Efforts to change problematic behaviors have included several years of psychotherapy and attenance in [facility]. He continues to struggle with recurrent and intense urges which cause him distress.     Client reported distress related to adverse childhood experiences that were traumatic. He attributes distress to ineffective coping, patterns of avoidance/numbing, and difficulties with functioning in relationships.      Progress Toward Treatment Goals:   Satisfactory progress     Therapeutic Interventions/Treatment Strategies:    Area(s) of treatment focus addressed in this session included Symptom Management and Sexual Health and Wellness    CBT or Interpersonal Approaches were implemented in support of group members  treatment progress. Group members checked in regarding maintaining boundaries, being authentic, coping effectively, and engaging in positive relationships and sexuality. Ramin shared progress he has been making in conceptualizing his positive cycle and positive core fuels. He shared his reactions to developing his cycle and progress he has made. He was an active participant in group and provided input to others.  Psychotherapist offered support, feedback and validation and reinforced use of skills Treatment modalities used include [facility] THERAPY INTERVENTIONS: Cognitive Behavioral Therapy Interpersonal  Support and Feedback    Patient Response:   Patient responded to session by accepting feedback, giving feedback, listening, focusing on goals and accepting support  Possible barriers to participation / learning include: N/A    Current Mental Status Exam:   Appearance:  Appropriate   Eye Contact:  Good   Attitude / Demeanor: Cooperative   Speech      Rate / Production: Normal/ Responsive       Volume:  Normal  volume  Orientation:  All  Mood:   Normal  Affect:   Appropriate   Thought Content: Clear   Insight:   Good       Plan/Need for Future Services:  Return for therapy in 1 week to treat diagnosed problems.    Patient has a current master individualized treatment plan.  See Epic treatment plan for more information.    Referral / Collaboration:  Referral to another professional/service is not indicated at this time..  Emergency Services Needed?  No    Assignment:  Re-work positive core fuels    Interactive Complexity:  There are four specific communication difficulties that complicate the work of the primary psychiatric procedure.  Interactive complexity (+40241) may be reported when at least one of these difficulties is present.    Communication difficulties present during current the psychiatric procedure include:  1. None.      Signature/Title:    Mason Adkins, PhD, LP    [facility] [facility] Sexual [facility], Sexual and Gender Health Clinic  Department of Family Medicine and Community Health  [employer] [facility] [employer] [facility] [employer]

## 2023-06-15 ENCOUNTER — VIRTUAL VISIT (OUTPATIENT)
Dept: PSYCHOLOGY | Facility: CLINIC | Age: 68
End: 2023-06-15
Payer: COMMERCIAL

## 2023-06-15 DIAGNOSIS — F43.9 TRAUMA AND STRESSOR-RELATED DISORDER: ICD-10-CM

## 2023-06-15 DIAGNOSIS — F41.9 ANXIETY DISORDER, UNSPECIFIED TYPE: ICD-10-CM

## 2023-06-15 DIAGNOSIS — F91.8 CONDUCT DISORDER, UNDIFFERENTIATED TYPE: Primary | ICD-10-CM

## 2023-06-15 PROCEDURE — 90853 GROUP PSYCHOTHERAPY: CPT | Mod: VID | Performed by: STUDENT IN AN ORGANIZED HEALTH CARE EDUCATION/TRAINING PROGRAM

## 2023-06-15 NOTE — GROUP NOTE
Gender and Sexual Health Clinic  [address], [address]  [place]  [address]    Group Progress Note  Gender and Sexual Health Clinic - Progress Note    Date of Service: 6/15/23   Name: Ramin Murcia  : 1955  Medical Record Number: [id number]  START TIME:  4:00 PM  END TIME:  6:00 PM  FACILITATOR(S): Mason Adkins, PhD  TOPIC: SGHC Group Therapy  Type of Session: Group  :  Number of Attendees:  7  Number of Minutes:  120    SERVICE MODALITY:  Video Visit:      Provider verified identity through the following two step process.  Patient provided:  Patient is known previously to provider    Telemedicine Visit: The patient's condition can be safely assessed and treated via synchronous audio and visual telemedicine encounter.      Reason for Telemedicine Visit: Services only offered telehealth    Originating Site (Patient Location): Patient's home    Distant Site (Provider Location): [facility] Clinics: Sexual and Gender Health Clinic    Consent:  The patient/guardian has verbally consented to: the potential risks and benefits of telemedicine (video visit) versus in person care; bill my insurance or make self-payment for services provided; and responsibility for payment of non-covered services.     Patient would like the video invitation sent by:  Other e-mail: see chart    Mode of Communication:  Video Conference via [employer]well    Distant Location (Provider):  On-site    As the provider I attest to compliance with applicable laws and regulations related to telemedicine.    Medical student Neftaly Cross and psychology student Yashira Pineda joined with group consent      DSM-5 Diagnoses:  Encounter Diagnoses   Name Primary?     Unspecified disruptive, impulse control, and conduct disorder (hypersexuality) Yes     Anxiety disorder, unspecified type      Trauma and stressor-related disorder          Current Reported Symptoms and Status update:  Changes since last session include no boundary  violations, some anxiety, some conflict with wife, moderate alcohol use.    Patient's problems with out of control, driven, impulsive, compulsive sexual behavior began in early 2020. He has been in a stable loving marriage for 40 years then began having covert hook-ups with young men.  Efforts to change problematic behaviors have included several years of psychotherapy and attenance in [facility]. He continues to struggle with recurrent and intense urges which cause him distress.     Client reported distress related to adverse childhood experiences that were traumatic. He attributes distress to ineffective coping, patterns of avoidance/numbing, and difficulties with functioning in relationships.      Progress Toward Treatment Goals:   Satisfactory progress     Therapeutic Interventions/Treatment Strategies:    Area(s) of treatment focus addressed in this session included Symptom Management, Interpersonal Relationship Skills and Sexual Health and Wellness    CBT and Interpersonal interventions were implemented to support the group therapy process. Members checked-in regarding boundaries, effective and ineffective coping, and relationship concerns. Client processed how his use of alcohol without telling his wife triggered a strong emotional response from her. He shared how honesty and trust are impacted by small behaviors. Client provided supportive feedback to others and was responsive to others.  Psychotherapist offered support, feedback and validation and reinforced use of skills Treatment modalities used include [facility] THERAPY INTERVENTIONS: Cognitive Behavioral Therapy Interpersonal  Support and Feedback    Patient Response:   Patient responded to session by accepting feedback, giving feedback, listening, focusing on goals and accepting support  Possible barriers to participation / learning include: N/A    Current Mental Status Exam:   Appearance:  Appropriate   Eye Contact:  Good   Attitude /  Demeanor: Interested  Speech      Rate / Production: Normal/ Responsive      Volume:  Normal  volume  Orientation:  All  Mood:   Normal  Affect:   Appropriate   Thought Content: Clear   Insight:   Good       Plan/Need for Future Services:  Return for therapy in 1 week to treat diagnosed problems.    Patient has a current master individualized treatment plan.  See Epic treatment plan for more information.    Referral / Collaboration:  Referral to another professional/service is not indicated at this time..  Emergency Services Needed?  No    Assignment:  Work on communication with wife    Interactive Complexity:  There are four specific communication difficulties that complicate the work of the primary psychiatric procedure.  Interactive complexity (+47119) may be reported when at least one of these difficulties is present.    Communication difficulties present during current the psychiatric procedure include:  1. None.      Signature/Title:      Mason Adkins, PhD, LP    [place] for Sexual and Gender [facility], Sexual and Gender Health Clinic  Department of Family Medicine and Community Health  [employer] [facility] [employer]

## 2023-06-20 ENCOUNTER — OFFICE VISIT (OUTPATIENT)
Dept: PSYCHOLOGY | Facility: CLINIC | Age: 68
End: 2023-06-20
Payer: COMMERCIAL

## 2023-06-20 DIAGNOSIS — F43.23 ADJUSTMENT DISORDER WITH MIXED ANXIETY AND DEPRESSED MOOD: ICD-10-CM

## 2023-06-20 DIAGNOSIS — F41.9 ANXIETY DISORDER, UNSPECIFIED TYPE: ICD-10-CM

## 2023-06-20 DIAGNOSIS — F43.9 TRAUMA AND STRESSOR-RELATED DISORDER: ICD-10-CM

## 2023-06-20 DIAGNOSIS — F91.8 CONDUCT DISORDER, UNDIFFERENTIATED TYPE: Primary | ICD-10-CM

## 2023-06-20 PROCEDURE — 90837 PSYTX W PT 60 MINUTES: CPT | Performed by: STUDENT IN AN ORGANIZED HEALTH CARE EDUCATION/TRAINING PROGRAM

## 2023-06-20 NOTE — PROGRESS NOTES
[facility] - Progress Note    Date of Service: 23   Name: Ramin Murcia  : 1955  Medical Record Number: [id number]  Treating Provider: Mason Adkins, PhD  Type of Session: Individual  Present in Session: Client and therapist  Session Start and Stop Time: 9:00am-9:55am  Number of Minutes:  55    SERVICE MODALITY:  In-person    DSM-5 Diagnoses:  Encounter Diagnoses   Name Primary?     Unspecified disruptive, impulse control, and conduct disorder (hypersexuality) Yes     Anxiety disorder, unspecified type      Adjustment disorder with mixed anxiety and depressed mood      Trauma and stressor-related disorder          Current Reported Symptoms and Status update:  Changes since last session include no boundary violations, one risk situation, some anxiety, some thoughts related to trauma history.    Patient's problems with out of control, driven, impulsive, compulsive sexual behavior began in early . He has been in a stable loving marriage for 40 years then began having covert hook-ups with young men.  Efforts to change problematic behaviors have included several years of psychotherapy and attenance in [facility]. He continues to struggle with recurrent and intense urges which cause him distress.     Client reported distress related to adverse childhood experiences that were traumatic. He attributes distress to ineffective coping, patterns of avoidance/numbing, and difficulties with functioning in relationships.      Progress Toward Treatment Goals:   Satisfactory progress     Therapeutic Interventions/Treatment Strategies:    Area(s) of treatment focus addressed in this session included Symptom Management, Interpersonal Relationship Skills and Sexual Health and Wellness    Client and therapist initially planned to update DA and tx plan, although client wanted to process a recent high-risk situation. In the process, therapist noted to client that he seemed to project internalized  homophobia onto his wife. Client then processed past sexual behaviors and shame he feels about them. He also processed experiences of secret-keeping and wanting to be honest but also effective. Therapist supported client to identify core negative beliefs related to these experiences.    Psychotherapist offered support, feedback and validation and reinforced use of skills Treatment modalities used include Cognitive Behavioral Therapy  Support and Feedback    Patient Response:   Patient responded to session by accepting feedback, giving feedback, listening, focusing on goals and accepting support  Possible barriers to participation / learning include: N/A    Current Mental Status Exam:   Appearance:  Appropriate   Eye Contact:  Good   Attitude / Demeanor: Cooperative   Speech      Rate / Production: Normal/ Responsive      Volume:  Normal  volume  Orientation:  All  Mood:   Normal  Affect:   Appropriate   Thought Content: Clear   Insight:   Good       Plan/Need for Future Services:  Return for therapy in 2 weeks to treat diagnosed problems.    Patient has a current master individualized treatment plan.  See Epic treatment plan for more information.    Referral / Collaboration:  Referral to another professional/service is not indicated at this time..  Emergency Services Needed?  No    Assignment:  [employer]    Interactive Complexity:  There are four specific communication difficulties that complicate the work of the primary psychiatric procedure.  Interactive complexity (+45876) may be reported when at least one of these difficulties is present.    Communication difficulties present during current the psychiatric procedure include:  1. None.        Mason Adkins, PhD, LP    [facility], [facility]  Department of Family Medicine and Community Health  [employer]

## 2023-06-22 ENCOUNTER — VIRTUAL VISIT (OUTPATIENT)
Dept: PSYCHOLOGY | Facility: CLINIC | Age: 68
End: 2023-06-22
Payer: COMMERCIAL

## 2023-06-22 DIAGNOSIS — F43.23 ADJUSTMENT DISORDER WITH MIXED ANXIETY AND DEPRESSED MOOD: ICD-10-CM

## 2023-06-22 DIAGNOSIS — F41.9 ANXIETY DISORDER, UNSPECIFIED TYPE: ICD-10-CM

## 2023-06-22 DIAGNOSIS — F43.9 TRAUMA AND STRESSOR-RELATED DISORDER: ICD-10-CM

## 2023-06-22 DIAGNOSIS — F91.8 CONDUCT DISORDER, UNDIFFERENTIATED TYPE: Primary | ICD-10-CM

## 2023-06-22 PROCEDURE — 90853 GROUP PSYCHOTHERAPY: CPT | Mod: VID | Performed by: STUDENT IN AN ORGANIZED HEALTH CARE EDUCATION/TRAINING PROGRAM

## 2023-06-22 NOTE — GROUP NOTE
[facility]  [address], [address]  [place]  [address]    Group Progress Note  [facility] - Progress Note    Date of Service: 23   Name: Ramin Murcia  : 1955  Medical Record Number: [id number]  START TIME:  4:00 PM  END TIME:  6:00 PM  FACILITATOR(S): Mason Adkins, PhD  TOPIC: [facility] Group Therapy  Type of Session: Group  :  Number of Attendees:  6  Number of Minutes:  120    SERVICE MODALITY:  Video Visit:      Provider verified identity through the following two step process.  Patient provided:  Patient is known previously to provider    Telemedicine Visit: The patient's condition can be safely assessed and treated via synchronous audio and visual telemedicine encounter.      Reason for Telemedicine Visit: Services only offered telehealth    Originating Site (Patient Location): Patient's home    Distant Site (Provider Location): [facility]: Sexual and Gender Health Clinic    Consent:  The patient/guardian has verbally consented to: the potential risks and benefits of telemedicine (video visit) versus in person care; bill my insurance or make self-payment for services provided; and responsibility for payment of non-covered services.     Patient would like the video invitation sent by:  Other e-mail: see chart    Mode of Communication:  Video Conference via Medical Zoom    Distant Location (Provider):  On-site    As the provider I attest to compliance with applicable laws and regulations related to telemedicine.    Medical student Neftaly Cross joined group with patients' consent.      DSM-5 Diagnoses:  Encounter Diagnoses   Name Primary?     Unspecified disruptive, impulse control, and conduct disorder (hypersexuality) Yes     Anxiety disorder, unspecified type      Adjustment disorder with mixed anxiety and depressed mood      Trauma and stressor-related disorder          Current Reported Symptoms and Status update:  Changes  since last session include urges to violate boundaries and high-risk situations, reduced alcohol use, anxiety controlled, trauma controlled.    Patient's problems with out of control, driven, impulsive, compulsive sexual behavior began in early 2020. He has been in a stable loving marriage for 40 years then began having covert hook-ups with young men.  Efforts to change problematic behaviors have included several years of psychotherapy and attenance in [facility]. He continues to struggle with recurrent and intense urges which cause him distress.     Client reported distress related to adverse childhood experiences that were traumatic. He attributes distress to ineffective coping, patterns of avoidance/numbing, and difficulties with functioning in relationships.      Progress Toward Treatment Goals:   Satisfactory progress     Therapeutic Interventions/Treatment Strategies:    Area(s) of treatment focus addressed in this session included Symptom Management, Interpersonal Relationship Skills and Sexual Health and Wellness    Group members checked in about maintaining boundaries, enhancing relationships and communication, sexual functioning, and managing co-occurring conditions. Cognitive behavioral and interpersonal therapy approaches were implemented in the group therapy process.  Client processed a near boundary violation. He shared his initiation with couples work with Dr. Gordillo at this clinic. He shared how he and his wife are also navigating power/control dynamics. Client provided feedback to others and responded to feedback from others.  Psychotherapist offered support, feedback and validation and reinforced use of skills Treatment modalities used include [facility] THERAPY INTERVENTIONS: Cognitive Behavioral Therapy Interpersonal  Support and Feedback    Patient Response:   Patient responded to session by accepting feedback, giving feedback, listening, focusing on goals and accepting support  Possible barriers to  participation / learning include: N/A    Current Mental Status Exam:   Appearance:  Appropriate   Eye Contact:  Good   Attitude / Demeanor: Cooperative   Speech      Rate / Production: Normal/ Responsive      Volume:  Normal  volume  Orientation:  All  Mood:   Normal  Affect:   Appropriate   Thought Content: Clear   Insight:   Good       Plan/Need for Future Services:  Return for therapy in 1 week to treat diagnosed problems.    Patient has a current master individualized treatment plan.  See Epic treatment plan for more information.    Referral / Collaboration:  Referral to another professional/service is not indicated at this time..  Emergency Services Needed?  No    Assignment:  Return in 1 week    Interactive Complexity:  There are four specific communication difficulties that complicate the work of the primary psychiatric procedure.  Interactive complexity (+28960) may be reported when at least one of these difficulties is present.    Communication difficulties present during current the psychiatric procedure include:  1. None.      Signature/Title:      Mason Adkins, PhD, LP    [facility], Sexual and Gender Health Clinic  Department of Family Medicine and Community Health  [employer] [facility] [facility] [employer]

## 2023-06-29 ENCOUNTER — VIRTUAL VISIT (OUTPATIENT)
Dept: PSYCHOLOGY | Facility: CLINIC | Age: 68
End: 2023-06-29
Payer: COMMERCIAL

## 2023-06-29 DIAGNOSIS — F91.8 CONDUCT DISORDER, UNDIFFERENTIATED TYPE: Primary | ICD-10-CM

## 2023-06-29 DIAGNOSIS — F43.23 ADJUSTMENT DISORDER WITH MIXED ANXIETY AND DEPRESSED MOOD: ICD-10-CM

## 2023-06-29 DIAGNOSIS — F41.9 ANXIETY DISORDER, UNSPECIFIED TYPE: ICD-10-CM

## 2023-06-29 PROCEDURE — 90853 GROUP PSYCHOTHERAPY: CPT | Mod: VID | Performed by: STUDENT IN AN ORGANIZED HEALTH CARE EDUCATION/TRAINING PROGRAM

## 2023-06-29 NOTE — GROUP NOTE
[facility] Sexual [facility] Clinic  [address], [address]  [place]  [address]    Group Progress Note  [facility] Clinic - Progress Note    Date of Service: 23   Name: Ramin Murcia  : 1955  Medical Record Number: [id number]  START TIME:  4:00 PM  END TIME:  5:30 PM  FACILITATOR(S): Mason Adkins, PhD  TOPIC: [facility] Group Therapy  Type of Session: Group  :  Number of Attendees:  4  Number of Minutes:  90    SERVICE MODALITY:  Video Visit:      Provider verified identity through the following two step process.  Patient provided:  Patient is known previously to provider    Telemedicine Visit: The patient's condition can be safely assessed and treated via synchronous audio and visual telemedicine encounter.      Reason for Telemedicine Visit: Services only offered telehealth    Originating Site (Patient Location): Patient's home    Distant Site (Provider Location): [facility]: Sexual and Gender Health Clinic    Consent:  The patient/guardian has verbally consented to: the potential risks and benefits of telemedicine (video visit) versus in person care; bill my insurance or make self-payment for services provided; and responsibility for payment of non-covered services.     Patient would like the video invitation sent by:  Other e-mail: see chart    Mode of Communication:  Video Conference via Medical Zoom    Distant Location (Provider):  On-site    As the provider I attest to compliance with applicable laws and regulations related to telemedicine.      DSM-5 Diagnoses:  Encounter Diagnoses   Name Primary?     Unspecified disruptive, impulse control, and conduct disorder (hypersexuality) Yes     Anxiety disorder, unspecified type      Adjustment disorder with mixed anxiety and depressed mood          Current Reported Symptoms and Status update:  Changes since last session include no boundary violations, some conflict with partner, anxiety reasonably  controlled.    Patient's problems with out of control, driven, impulsive, compulsive sexual behavior began in early 2020. He has been in a stable loving marriage for 40 years then began having covert hook-ups with young men.  Efforts to change problematic behaviors have included several years of psychotherapy and attenance in [facility]. He continues to struggle with recurrent and intense urges which cause him distress.     Client reported distress related to adverse childhood experiences that were traumatic. He attributes distress to ineffective coping, patterns of avoidance/numbing, and difficulties with functioning in relationships.      Progress Toward Treatment Goals:   Satisfactory progress     Therapeutic Interventions/Treatment Strategies:    Area(s) of treatment focus addressed in this session included Symptom Management, Interpersonal Relationship Skills and Sexual Health and Wellness    Group members checked in regarding boundaries, coping, positive sexuality, and relationships/communication. Members provided each other feedback regarding treatment progress and responded feedback provided to them. Client processed a recent disagreement with his wife. The group challenged his core beliefs and understanding of the argument, and encouraged him to think of ways he can connect with his wife.  Psychotherapist offered support, feedback and validation and reinforced use of skills Treatment modalities used include [facility] THERAPY INTERVENTIONS: Cognitive Behavioral Therapy Interpersonal  Support and Feedback    Patient Response:   Patient responded to session by accepting feedback, giving feedback, listening, focusing on goals and accepting support  Possible barriers to participation / learning include: N/A    Current Mental Status Exam:   Appearance:  Appropriate   Eye Contact:  Good   Attitude / Demeanor: Cooperative   Speech      Rate / Production: Normal/ Responsive      Volume:  Normal   volume  Orientation:  All  Mood:   Normal  Affect:   Appropriate   Thought Content: Clear   Insight:   Good       Plan/Need for Future Services:  Return for therapy in 2 weeks to treat diagnosed problems.    Patient has a current master individualized treatment plan.  See Epic treatment plan for more information.    Referral / Collaboration:  Referral to another professional/service is not indicated at this time..  Emergency Services Needed?  No    Assignment:  Return in 2 weeks. Work on communication with partner.    Interactive Complexity:  There are four specific communication difficulties that complicate the work of the primary psychiatric procedure.  Interactive complexity (+21437) may be reported when at least one of these difficulties is present.    Communication difficulties present during current the psychiatric procedure include:  1. None.      Signature/Title:      Mason Adkins, PhD, LP    [facility], Sexual and Gender Health Clinic  Department of Family Medicine and Community Health  [employer]

## 2023-07-13 ENCOUNTER — VIRTUAL VISIT (OUTPATIENT)
Dept: PSYCHOLOGY | Facility: CLINIC | Age: 68
End: 2023-07-13
Payer: COMMERCIAL

## 2023-07-13 DIAGNOSIS — F91.8 CONDUCT DISORDER, UNDIFFERENTIATED TYPE: Primary | ICD-10-CM

## 2023-07-13 DIAGNOSIS — F41.9 ANXIETY DISORDER, UNSPECIFIED TYPE: ICD-10-CM

## 2023-07-13 DIAGNOSIS — F43.23 ADJUSTMENT DISORDER WITH MIXED ANXIETY AND DEPRESSED MOOD: ICD-10-CM

## 2023-07-13 DIAGNOSIS — F43.9 TRAUMA AND STRESSOR-RELATED DISORDER: ICD-10-CM

## 2023-07-13 PROCEDURE — 90853 GROUP PSYCHOTHERAPY: CPT | Mod: VID | Performed by: STUDENT IN AN ORGANIZED HEALTH CARE EDUCATION/TRAINING PROGRAM

## 2023-07-13 NOTE — GROUP NOTE
[facility] Clinic  [address], [address]  [place]  [address]    Group Progress Note  Gender [facility] Clinic - Progress Note    Date of Service: 23   Name: Ramin Murcia  : 1955  Medical Record Number: [id number]  START TIME:  4:00 PM  END TIME:  5:15 PM; client left group early  FACILITATOR(S): Mason Adkins, PhD  TOPIC: [facility] Group Therapy  Type of Session: Group  :  Number of Attendees:  7  Number of Minutes:  75    SERVICE MODALITY:  Video Visit:      Provider verified identity through the following two step process.  Patient provided:  Patient is known previously to provider    Telemedicine Visit: The patient's condition can be safely assessed and treated via synchronous audio and visual telemedicine encounter.      Reason for Telemedicine Visit: Services only offered telehealth    Originating Site (Patient Location): Patient's home    Distant Site (Provider Location): [facility] Clinics: Sexual and Gender Health Clinic    Consent:  The patient/guardian has verbally consented to: the potential risks and benefits of telemedicine (video visit) versus in person care; bill my insurance or make self-payment for services provided; and responsibility for payment of non-covered services.     Patient would like the video invitation sent by:  Other e-mail: see chart    Mode of Communication:  Video Conference via Medical Zoom    Distant Location (Provider):  On-site    As the provider I attest to compliance with applicable laws and regulations related to telemedicine.      DSM-5 Diagnoses:  Encounter Diagnoses   Name Primary?     Unspecified disruptive, impulse control, and conduct disorder (hypersexuality) Yes     Anxiety disorder, unspecified type      Adjustment disorder with mixed anxiety and depressed mood      Trauma and stressor-related disorder          Current Reported Symptoms and Status update:  Changes since last session include no boundary  violations, some risk situations, some feeling of entitlement and alcohol use, some anxiety.    Patient's problems with out of control, driven, impulsive, compulsive sexual behavior began in early 2020. He has been in a stable loving marriage for 40 years then began having covert hook-ups with young men.  Efforts to change problematic behaviors have included several years of psychotherapy and attenance in [facility]. He continues to struggle with recurrent and intense urges which cause him distress.     Client reported distress related to adverse childhood experiences that were traumatic. He attributes distress to ineffective coping, patterns of avoidance/numbing, and difficulties with functioning in relationships.      Progress Toward Treatment Goals:   Satisfactory progress     Therapeutic Interventions/Treatment Strategies:    Area(s) of treatment focus addressed in this session included Symptom Management, Interpersonal Relationship Skills and Sexual Health and Wellness    CBT and Interpersonal approaches were implemented to facilitate group therapy. Group members checked in regarding goal progress, boundary maintenance, intimacy and sexual health, and coping. Members provided supportive feedback to others and responded to feedback from others. Client processed recent alcohol use and the impacts of it on his wife's trust. He discussed how positive urgency relates to tendency to push boundaries in his cycle. He reflected on conflict in his relationship. He actively participated through group.     Psychotherapist offered support, feedback and validation and reinforced use of skills Treatment modalities used include [facility] THERAPY INTERVENTIONS: Cognitive Behavioral Therapy Interpersonal  Support and Feedback    Patient Response:   Patient responded to session by accepting feedback, giving feedback, listening, focusing on goals and accepting support  Possible barriers to participation / learning include: N/A    Current  Mental Status Exam:   Appearance:  Appropriate   Eye Contact:  Good   Attitude / Demeanor: Cooperative   Speech      Rate / Production: Normal/ Responsive      Volume:  Normal  volume  Orientation:  All  Mood:   Normal  Affect:   Appropriate   Thought Content: Clear   Insight:   Good       Plan/Need for Future Services:  Return for therapy in 1 week to treat diagnosed problems.    Patient has a current master individualized treatment plan.  See Epic treatment plan for more information.    Referral / Collaboration:  Referral to another professional/service is not indicated at this time..  Emergency Services Needed?  No    Assignment:  Consider risk situations and ways of reducing risk    Interactive Complexity:  There are four specific communication difficulties that complicate the work of the primary psychiatric procedure.  Interactive complexity (+01680) may be reported when at least one of these difficulties is present.    Communication difficulties present during current the psychiatric procedure include:  1. None.      Signature/Title:      Mason Adkins, PhD, LP    [facility], Sexual and Gender Health Clinic  Department of Family Medicine and Community Health  [employer]

## 2023-07-18 ENCOUNTER — OFFICE VISIT (OUTPATIENT)
Dept: PSYCHOLOGY | Facility: CLINIC | Age: 68
End: 2023-07-18
Payer: COMMERCIAL

## 2023-07-18 DIAGNOSIS — F41.9 ANXIETY DISORDER, UNSPECIFIED TYPE: ICD-10-CM

## 2023-07-18 DIAGNOSIS — F91.8 CONDUCT DISORDER, UNDIFFERENTIATED TYPE: Primary | ICD-10-CM

## 2023-07-18 PROCEDURE — 90791 PSYCH DIAGNOSTIC EVALUATION: CPT | Performed by: STUDENT IN AN ORGANIZED HEALTH CARE EDUCATION/TRAINING PROGRAM

## 2023-07-18 ASSESSMENT — ANXIETY QUESTIONNAIRES
7. FEELING AFRAID AS IF SOMETHING AWFUL MIGHT HAPPEN: NOT AT ALL
6. BECOMING EASILY ANNOYED OR IRRITABLE: SEVERAL DAYS
3. WORRYING TOO MUCH ABOUT DIFFERENT THINGS: NOT AT ALL
2. NOT BEING ABLE TO STOP OR CONTROL WORRYING: NOT AT ALL
5. BEING SO RESTLESS THAT IT IS HARD TO SIT STILL: NOT AT ALL
GAD7 TOTAL SCORE: 2
1. FEELING NERVOUS, ANXIOUS, OR ON EDGE: SEVERAL DAYS
GAD7 TOTAL SCORE: 2

## 2023-07-18 ASSESSMENT — PATIENT HEALTH QUESTIONNAIRE - PHQ9
SUM OF ALL RESPONSES TO PHQ QUESTIONS 1-9: 3
5. POOR APPETITE OR OVEREATING: NOT AT ALL

## 2023-07-18 NOTE — PROGRESS NOTES
"                [facility]            [address] [place] [address] [address]  [place] [address]                                                                                Phone: [phone]                                                                                  Fax: [phone]                                                                    [email or web address]    ANNUAL UPDATE: Diagnostic Assessment Interview (updated 4/3/2017)    Date of Service: 7/18/23  Client Name: Ramin Tay of Birth:  1955  68 year old  MRN:  [id number]  Gender/Gender Identity: Cisgender man  Treating Provider: Mason Adkins, PhD,   Program: CSB  Type of Session: Assessment  Present in Session: client and therapist  Number of Minutes:  55    Session conducted in-person.     Updated Presenting Problem and Goals:  Client stated \"I have developed the tools to lead the life I want to be leading. My goal is to make it easier so it is not a struggle and to maintain the path that I am on: repairing my relationship with my wife Elizabeth.\"    \"I am still tempted about porn and that is often associated with me having too much to drink.\"    Patient's problems with out of control, driven, impulsive, compulsive sexual behavior began in early 2020. He has been in a stable loving marriage for 40 years then began having covert hook-ups with young men.  Efforts to change problematic behaviors have included several years of psychotherapy and attenance in [facility]. He continues to struggle with recurrent and intense urges which cause him distress.     Client has a history of adjustment disorder, unspecified anxiety disorder, and trauma-related stressor for which he is actively engaged in treatment.    Updated Mental Health History:   Individual therapy with this provider.  Group therapy with this provider.  Recently started couples therapy with Lucina Gordillo PSYD at this clinic.  Continues to participate in " "SA.    Updated Substance Use:   Usually consumes two standard sized drinks per day. 1 day per week, consumes 3-4 drinks in one episode.    Updated Medical History:   No updates    Updated Family History:   No updates    Updated Current Significant Relationship/Partner:  Continues to be  to [place]. Reports they are getting along more but not experiencing physical/sexual intimacy.    Concurrent/extramarital relationships:  Denied    Updated Educational History:  PhD in Economics    Updated Occupational History:  Currently retired    Updated Legal Issues:  Denied  ________________________________________________________________  CONCLUSIONS    Updated Strengths and Liabilities:   Strengths: \"In the right circumstances, I am a pretty good leader and I am a pretty good friend to the few friends I have.\"    Areas of growth: \"Still struggle with control issue and I continue to need to work on CSB stuff.\"    Updated Symptoms:  - Frequent fantasies   - Frequent urges/temptations for sexual boundary violations   - Ongoing distress about sexual urges/temptations  - Some experiences of anxiety and irritability reasonably controlled with medication and treatment    - Client reported he believes adjustment and trauma problems have reduced and does not see those are current problems    See updated PHQ-9, YONY-7, and PROMIS-SF    Updated Mental Status:      Mental Status:   Appearance:  No apparent distress and Well groomed  Behavior/relationship to examiner/demeanor:  Cooperative and Engaged  Orientation: Oriented to person, place, time and situation  Speech Rate:  Normal  Speech Spontaneity:  Normal  Mood:  calm, friendly and normal  Affect:  Appropriate/mood-congruent  Thought Process (Associations):  Logical, Linear and Goal directed  Thought Content:  denies suicidal ideation, intent or thoughts and No violent ideation  Abnormal Perception:  None  Attention/Concentration:  Normal  Language:  Intact  Insight:  " Good  Judgment:  Good      Interactive Complexity:  Communication difficulties present during the current psychiatric procedure included:  1. None    Updated DSM-5 Diagnoses:  Encounter Diagnoses   Name Primary?     Unspecified disruptive, impulse control, and conduct disorder (hypersexuality) Yes     Anxiety disorder, unspecified type        Conclusions/Recommendations/Initial Treatment Goals:   The client is a 68 year old Not  or   man assigned male at birth who identifies as bisexual. Based on the client's current report of symptoms, client continues to meet criteria for other specified disruptive, impulse control, and conduct disorder (hypersexuality). The client's mental health concerns continue to affect client's ability to function in his relationship with his wife and have been causing clinically significant distress. The client reports consuming two alcohol drinks daily and up to 3-4 drinks per episodes approximately 1 time per week. He is recognizing the interaction between his alcohol use and CSB concerns and plans to address this. Client feels he has adequately addressed past trauma in treatment and feels his anxiety is reasonably controlled. He also reported not experiencing ongoing effects of adjustment. Client denies safety concerns. Based on the client's reported symptoms and impact on functioning, the plan for the patient is:  1. Continue supportive individual, group, and family therapy with a provider specialized in compulsive sexual behavior. Therapy should focus on relationships, intimacy, boundaries, relationship repair, and sexual wellness.  2. Continue care with Beau Richards for medication management.   3. Continue to assess the impact of client's family and relational patterns on their current well-being.   4. Coordinate care as needed with medical providers.         [facility]:  Individualized Treatment Plan     Date of Plan: 7/18/23  Name: Ramin MCMANUS  Divina MRN: [id number]  : 1955     DSM5 Diagnoses:   Encounter Diagnoses   Name Primary?     Unspecified disruptive, impulse control, and conduct disorder (hypersexuality) Yes     Anxiety disorder, unspecified type        Psychosocial / Contextual Factors: Marital conflict secondary to fidelity concerns, sexual orientation identity development/acceptance, [facility]  PROMIS:  The following assessments were completed by patient for this visit:  PHQ9:       2022    11:23 AM 3/24/2022     1:52 PM 2022     3:53 PM 2023    10:54 AM 2023     9:19 AM   PHQ-9 SCORE   PHQ-9 Total Score MyChart 3 (Minimal depression)  3 (Minimal depression) 1 (Minimal depression)    PHQ-9 Total Score 3 3 3 1 3     GAD7:       2022    11:23 AM 3/24/2022     1:52 PM 2022     3:52 PM 2023     9:19 AM   YONY-7 SCORE   Total Score 0 (minimal anxiety)  2 (minimal anxiety)    Total Score 0 3 2 2     PROMIS 10-Global Health (all questions and answers displayed):       3/21/2022     7:39 AM 2022     8:34 AM 2022     7:25 PM 2023     4:41 PM 2023    10:55 AM 2023     9:19 AM   PROMIS 10   In general, would you say your health is: Very good Excellent Very good Very good Excellent    In general, would you say your quality of life is: Very good Very good Very good Very good Very good    In general, how would you rate your physical health? Excellent Excellent Excellent Excellent Excellent    In general, how would you rate your mental health, including your mood and your ability to think? Good Very good Excellent Very good Very good    In general, how would you rate your satisfaction with your social activities and relationships? Very good Very good Very good Good Good    In general, please rate how well you carry out your usual social activities and roles Very good Very good Very good Very good Excellent    To what extent are you able to carry out your everyday physical activities such as  walking, climbing stairs, carrying groceries, or moving a chair? Completely Completely Completely Completely Completely    In the past 7 days, how often have you been bothered by emotional problems such as feeling anxious, depressed, or irritable? Rarely Never Rarely Never Rarely    In the past 7 days, how would you rate your fatigue on average? Mild Mild Mild None Mild    In the past 7 days, how would you rate your pain on average, where 0 means no pain, and 10 means worst imaginable pain? 4 2 2 2 2    In general, would you say your health is: 4    4 5 4 4 5 4   In general, would you say your quality of life is: 4    4 4 4 4 4 4   In general, how would you rate your physical health? 5    5 5 5 5 5 4   In general, how would you rate your mental health, including your mood and your ability to think? 3    3 4 5 4 4 3   In general, how would you rate your satisfaction with your social activities and relationships? 4    4 4 4 3 3 3   In general, please rate how well you carry out your usual social activities and roles. (This includes activities at home, at work and in your community, and responsibilities as a parent, child, spouse, employee, friend, etc.) 4    4 4 4 4 5 5   To what extent are you able to carry out your everyday physical activities such as walking, climbing stairs, carrying groceries, or moving a chair? 5    5 5 5 5 5 5   In the past 7 days, how often have you been bothered by emotional problems such as feeling anxious, depressed, or irritable? 2    2 1 2 1 2 2   In the past 7 days, how would you rate your fatigue on average? 2    2 2 2 1 2 2   In the past 7 days, how would you rate your pain on average, where 0 means no pain, and 10 means worst imaginable pain? 4    4 2 2 2 2 2   Global Mental Health Score 15    15 17 17 16 15 14   Global Physical Health Score 17    17 18 18 19 18 17   PROMIS TOTAL - SUBSCORES 32    32 35 35 35 33 31     Referral / Collaboration:  Referral to another  "professional/service is not indicated at this time..  Anticipated number of session for this episode of care: 12 individual, 24 group  Anticipation frequency of session:biweekly individual sessions, weekly group sessions  Anticipated Duration of each session: 60 mins  Treatment plan will be reviewed in 180 days or when goals have been changed.    SERVICE MODALITY:  In-person    Impact of Symptoms on Function:  Decreased Social/Family Function    Sexual Problems:  Erectile Problems  Orgasmic Problems    Gender Concerns:  None reported    Client Strengths:  Strengths: \"In the right circumstances, I am a pretty good leader and I am a pretty good friend to the few friends I have.\"    Client Participation in Plan:  Contributed to goals and plan   Agrees with plan     Areas of Vulnerability:  Areas of growth: \"Still struggle with control issue and I continue to need to work on CSB stuff.\"    Long-Term Goals:   \"I have developed the tools to lead the life I want to be leading. My goal is to make it easier so it is not a struggle and to maintain the path that I am on: repairing my relationship with my wife [place].\"      Discharge Criteria:  Satisfactory progress toward treatment goals      Areas of Treatment Focus     Why are you seeking treatment/What do you want to focus on during treatment? \"I have developed the tools to lead the life I want to be leading. My goal is to make it easier so it is not a struggle and to maintain the path that I am on: repairing my relationship with my wife Elizabeth.\"    Area of Treatment Focus:  Address associated mental and chemical health issues  Start Date:   7/18/23    Goal:                                Target Date: 1/18/24                                          Status: Active  Stabilize mood, anxiety, and chemical health issues.   Coordinate care if medication is needed.  Discuss chemical use patterns and their effects on compulsive sexual behavior.    Progress:   Satisfactory     "   Intervention(s):  Therapist will provide psychoeducation on associations between CSB and mental health and substance use.  Therapist will refer out and coordinate services as-needed.         Area of Treatment Focus:  Develop healthy coping skills  Start Date:   7/18/23    Goal: Target Date: 1/18/24 Status: Active  Develop healthy coping skills to support sexual, mental, and chemical health.    Progress:   Satisfactory       Intervention(s):  Therapist will teach coping strategies and distress tolerance skills as needed.  Therapist will practice coping skills with client.  Therapist will discuss ways to implement coping skills into daily life.  Therapist will help client plan for using coping skills.  Therapist will teach mindfulness strategies to client.       Area of Treatment Focus:  Intervene on acting-out patterns  Start Date:   7/18/23    Goal: Target Date: 1/18/24 Status: Active  Gain a deeper understanding of your cycle in order to intervene in acting out patterns    Progress:   Satisfactory       Intervention(s):  Therapist will support client to conceptualize antecedents, relationship/sexual patterns, and negative cycles.  Therapist will help client identify themes in antecedents and identify negative core beliefs.  Therapist will collaborate with client to identify positive cycle.         Area of Treatment Focus:   Develop personal sexual health  Start Date:    7/18/23    Goal: Target Date: 1/18/24 Status: Active  Develop healthy sexuality in line with boundaries and values.      Progress:   Satisfactory        Treatment Strategies:  Therapist will provide readings related to healthy sexuality.  Therapist will provide a  How s my sexual health  questionnaire for client to complete.  Therapist will address sexual dysfunction concerns.  Therapist will encourage client to share sexual fantasies that are comfortable and uncomfortable to help identify healthy sexual fantasies.  Therapist will help client  develop an understanding of healthy sexuality.     Area of Treatment Focus:   Develop relational sexual health  Start Date:    7/18/23    Goal: Target Date: 1/18/24 Status: Active  Develop healthy sexuality in line with boundaries and values in your relationship.      Progress:   Satisfactory        Treatment Strategies:  Therapist will help client share vision of healthy sexuality with partner.  Therapist will teach communication skills about sexuality to help client comfortably and effectively discuss sex with partner.  Therapist will support client and partner to develop a shared understanding of expressing and satisfying sexual desires and needs.  Therapist will support client and partner to rebuild sexual interaction in the relationship.         Mason Adkins, PhD, LP    [facility], [facility]  Department of Family Medicine and Community Health  [facility] [employer]

## 2023-07-20 ENCOUNTER — VIRTUAL VISIT (OUTPATIENT)
Dept: PSYCHOLOGY | Facility: CLINIC | Age: 68
End: 2023-07-20
Payer: COMMERCIAL

## 2023-07-20 DIAGNOSIS — F41.9 ANXIETY DISORDER, UNSPECIFIED TYPE: ICD-10-CM

## 2023-07-20 DIAGNOSIS — F91.8 CONDUCT DISORDER, UNDIFFERENTIATED TYPE: Primary | ICD-10-CM

## 2023-07-20 PROCEDURE — 90853 GROUP PSYCHOTHERAPY: CPT | Mod: VID | Performed by: STUDENT IN AN ORGANIZED HEALTH CARE EDUCATION/TRAINING PROGRAM

## 2023-07-20 NOTE — GROUP NOTE
[facility]  [address], [address]  [place]  [address]    Group Progress Note  [facility] - Progress Note    Date of Service: 23   Name: Ramin Murcia  : 1955  Medical Record Number: [id number]  START TIME:  4:00 PM  END TIME:  6:00 PM  FACILITATOR(S): Mason Adkins, PhD  TOPIC: [facility] Group Therapy  Type of Session: Group  :  Number of Attendees:  7  Number of Minutes:  120    SERVICE MODALITY:  Video Visit:      Provider verified identity through the following two step process.  Patient provided:  Patient is known previously to provider and Patient was verified at admission/transfer    Telemedicine Visit: The patient's condition can be safely assessed and treated via synchronous audio and visual telemedicine encounter.      Reason for Telemedicine Visit: Services only offered telehealth    Originating Site (Patient Location): Patient's home    Distant Site (Provider Location): [facility] Clinics: Sexual and [facility] Health Clinic    Consent:  The patient/guardian has verbally consented to: the potential risks and benefits of telemedicine (video visit) versus in person care; bill my insurance or make self-payment for services provided; and responsibility for payment of non-covered services.     Patient would like the video invitation sent by:  Other e-mail: see chart    Mode of Communication:  Video Conference via Medical Zoom    Distant Location (Provider):  On-site    As the provider I attest to compliance with applicable laws and regulations related to telemedicine.      DSM-5 Diagnoses:  Encounter Diagnoses   Name Primary?     Unspecified disruptive, impulse control, and conduct disorder (hypersexuality) Yes     Anxiety disorder, unspecified type          Current Reported Symptoms and Status update:  Changes since last session include no boundary violations, some urges, anxiety reasonably controlled with treatment.    Patient's problems  with out of control, driven, impulsive, compulsive sexual behavior began in early 2020. He has been in a stable loving marriage for 40 years then began having covert hook-ups with young men.  Efforts to change problematic behaviors have included several years of psychotherapy and attenance in [facility]. He continues to struggle with recurrent and intense urges which cause him distress.     Client reported distress related to adverse childhood experiences that were traumatic. He attributes distress to ineffective coping, patterns of avoidance/numbing, and difficulties with functioning in relationships.      Progress Toward Treatment Goals:   Satisfactory progress     Therapeutic Interventions/Treatment Strategies:    Area(s) of treatment focus addressed in this session included Symptom Management, Interpersonal Relationship Skills and Sexual Health and Wellness    CBT and Interpersonal approaches were implemented to facilitate group therapy. Group members checked in regarding boundaries, sexual and relationship health, co-occurring mental health and substance use, and treatment progress. We revisited group expectations regarding scheduling, attendance, and attention. Client reflected on his treatment progress and goals. He identified concerning to be worried about potential risk situations and boundary violations.  Psychotherapist offered support, feedback and validation and reinforced use of skills Treatment modalities used include [facility] THERAPY INTERVENTIONS: Cognitive Behavioral Therapy Interpersonal  Support and Feedback    Patient Response:   Patient responded to session by accepting feedback, giving feedback, listening, focusing on goals and accepting support  Possible barriers to participation / learning include: N/A    Current Mental Status Exam:   Appearance:  Appropriate   Eye Contact:  Good   Attitude / Demeanor: Cooperative   Speech      Rate / Production: Normal/ Responsive      Volume:  Normal   volume  Orientation:  All  Mood:   Normal  Affect:   Appropriate   Thought Content: Clear   Insight:   Good       Plan/Need for Future Services:  Return for therapy in 1 week to treat diagnosed problems.    Patient has a current master individualized treatment plan.  See Epic treatment plan for more information.    Referral / Collaboration:  Referral to another professional/service is not indicated at this time..  Emergency Services Needed?  No    Assignment:  Continue working on cycles    Interactive Complexity:  There are four specific communication difficulties that complicate the work of the primary psychiatric procedure.  Interactive complexity (+47371) may be reported when at least one of these difficulties is present.    Communication difficulties present during current the psychiatric procedure include:  1. None.      Signature/Title:      Mason Adkins, PhD, LP    [facility], [facility]  Department of Family Medicine and Community Health  [facility]

## 2023-08-01 ENCOUNTER — OFFICE VISIT (OUTPATIENT)
Dept: PSYCHOLOGY | Facility: CLINIC | Age: 68
End: 2023-08-01
Payer: COMMERCIAL

## 2023-08-01 DIAGNOSIS — F41.9 ANXIETY DISORDER, UNSPECIFIED TYPE: ICD-10-CM

## 2023-08-01 DIAGNOSIS — F91.8 CONDUCT DISORDER, UNDIFFERENTIATED TYPE: Primary | ICD-10-CM

## 2023-08-01 PROCEDURE — 90834 PSYTX W PT 45 MINUTES: CPT | Performed by: STUDENT IN AN ORGANIZED HEALTH CARE EDUCATION/TRAINING PROGRAM

## 2023-08-01 NOTE — PROGRESS NOTES
"[facility] - Progress Note    Date of Service: 23   Name: Ramin Murcia  : 1955  Medical Record Number: [id number]  Treating Provider: Mason Adkins, PhD  Type of Session: Individual  Present in Session: Client and therapist  Session Start and Stop Time: 9:00am-9:52am  Number of Minutes:  52    SERVICE MODALITY:  In-person    DSM-5 Diagnoses:  Encounter Diagnoses   Name Primary?     Unspecified disruptive, impulse control, and conduct disorder (hypersexuality) Yes     Anxiety disorder, unspecified type          Current Reported Symptoms and Status update:  Changes since last session include no boundary violations but some risk situations, feeling \"stuck\" in treatment progress, some anxiety/worry.    Patient's problems with out of control, driven, impulsive, compulsive sexual behavior began in early . He has been in a stable loving marriage for 40 years then began having covert hook-ups with young men.  Efforts to change problematic behaviors have included several years of psychotherapy and attenance in [facility]. He continues to struggle with recurrent and intense urges which cause him distress.     Client reported distress related to adverse childhood experiences that were traumatic. He attributes distress to ineffective coping, patterns of avoidance/numbing, and difficulties with functioning in relationships.      Progress Toward Treatment Goals:   Satisfactory progress     Transferred care from Dr. Bacon to Dr. Adkins 11/10/22  Working to understand antecedents, risks, and urges  Working to understand negative cycles  Collateral session with client's wife, Elizabeth 23  Started written exposure therapy for trauma 23  Completed written exposure therapy 4/3/23. PCL scores went from 30 to 16 - significant reduction in symptoms.  Collateral session with Elizabeth 23  DA and treatment plan updated 23      Therapeutic Interventions/Treatment " Strategies:    Area(s) of treatment focus addressed in this session included Symptom Management, Interpersonal Relationship Skills and Sexual Health and Wellness    Client shared recent high-risk situations and frustration with not being farther along in his treatment/skills being easier to use. Therapist challenged client to use less passive language in describing high-risk situations, and client acknowledged ways in which thoughts/feelings/behaviors more actively contribute to risk. We discussed ways for client to re-engage with his positive cycle and expand his conceptualization of his negative cycle. Client expressed finding the session helpful.     Psychotherapist offered support, feedback and validation and reinforced use of skills Treatment modalities used include Cognitive Behavioral Therapy Interpersonal  Support and Feedback    Patient Response:   Patient responded to session by accepting feedback, giving feedback, listening, focusing on goals and accepting support  Possible barriers to participation / learning include: N/A    Current Mental Status Exam:   Appearance:  Appropriate   Eye Contact:  Good   Attitude / Demeanor: Cooperative   Speech      Rate / Production: Normal/ Responsive      Volume:  Normal  volume  Orientation:  All  Mood:   Normal  Affect:   Appropriate   Thought Content: Clear   Insight:   Good       Plan/Need for Future Services:  Return for therapy in 2 weeks to treat diagnosed problems.    Patient has a current master individualized treatment plan.  See Epic treatment plan for more information.    Referral / Collaboration:  Referral to another professional/service is not indicated at this time..  Emergency Services Needed?  No    Assignment:  Expand negative cycle; re-engage positive cycle.     Interactive Complexity:  There are four specific communication difficulties that complicate the work of the primary psychiatric procedure.  Interactive complexity (+91991) may be reported when  at least one of these difficulties is present.    Communication difficulties present during current the psychiatric procedure include:  1. None.      Signature/Title:      Mason Adkins, PhD, LP    [facility] [facility], [facility]  Department of Family Medicine and Community Health  [facility]

## 2023-08-03 ENCOUNTER — VIRTUAL VISIT (OUTPATIENT)
Dept: PSYCHOLOGY | Facility: CLINIC | Age: 68
End: 2023-08-03
Payer: COMMERCIAL

## 2023-08-03 DIAGNOSIS — F91.8 CONDUCT DISORDER, UNDIFFERENTIATED TYPE: Primary | ICD-10-CM

## 2023-08-03 DIAGNOSIS — F41.9 ANXIETY DISORDER, UNSPECIFIED TYPE: ICD-10-CM

## 2023-08-03 PROCEDURE — 90853 GROUP PSYCHOTHERAPY: CPT | Mod: VID | Performed by: STUDENT IN AN ORGANIZED HEALTH CARE EDUCATION/TRAINING PROGRAM

## 2023-08-03 NOTE — GROUP NOTE
[facility]  [address], [address]  [place]  [address]    Group Progress Note  [facility] - Progress Note    Date of Service: 23   Name: Ramin Murcia  : 1955  Medical Record Number: [id number]  START TIME:  4:00 PM  END TIME:  6:00 PM  FACILITATOR(S): Mason Adkins, PhD  TOPIC: SGHC Group Therapy  Type of Session: Group  :  Number of Attendees:  7  Number of Minutes:  120    SERVICE MODALITY:  Video Visit:      Provider verified identity through the following two step process.  Patient provided:  Patient is known previously to provider    Telemedicine Visit: The patient's condition can be safely assessed and treated via synchronous audio and visual telemedicine encounter.      Reason for Telemedicine Visit: Services only offered telehealth    Originating Site (Patient Location): Patient's home    Distant Site (Provider Location): [facility]: Sexual [facility] Health Clinic    Consent:  The patient/guardian has verbally consented to: the potential risks and benefits of telemedicine (video visit) versus in person care; bill my insurance or make self-payment for services provided; and responsibility for payment of non-covered services.     Patient would like the video invitation sent by:  Other e-mail: see MIKA Audio    Mode of Communication:  Video Conference via Medical Zoom    Distant Location (Provider):  On-site    As the provider I attest to compliance with applicable laws and regulations related to telemedicine.      DSM-5 Diagnoses:  Encounter Diagnoses   Name Primary?     Unspecified disruptive, impulse control, and conduct disorder (hypersexuality) Yes     Anxiety disorder, unspecified type          Current Reported Symptoms and Status update:  Changes since last session include no boundary violations, some risk situations, working on identifying ways in which he engages in high risk situations, anxiety reasonably  controlled.    Progress Toward Treatment Goals:   Satisfactory progress     Therapeutic Interventions/Treatment Strategies:    Area(s) of treatment focus addressed in this session included Symptom Management, Interpersonal Relationship Skills and Sexual Health and Wellness    CBT and Interpersonal Interventions were implemented in the group process to support clients  treatment. Group members shared progress towards treatment goals, relationship and sexual concerns, boundaries, and health/mental health. Client shared ways in which he is re-engaging with his positive cycle, recognizing how he contributes to high-risk situations, and is working on mindfulness. Client is also working to improve communication with his partner. He provided supportive feedback to others and responded to input provided to him.     Psychotherapist offered support, feedback and validation and reinforced use of skills Treatment modalities used include [facility] THERAPY INTERVENTIONS: Cognitive Behavioral Therapy Interpersonal  Support and Feedback    Patient Response:   Patient responded to session by accepting feedback, giving feedback, listening, focusing on goals and accepting support  Possible barriers to participation / learning include: N/A    Current Mental Status Exam:   Appearance:  Appropriate   Eye Contact:  Good   Attitude / Demeanor: Cooperative   Speech      Rate / Production: Normal/ Responsive      Volume:  Normal  volume  Orientation:  All  Mood:   Normal  Affect:   Appropriate   Thought Content: Clear   Insight:   Good       Plan/Need for Future Services:  Return for therapy in 1 week to treat diagnosed problems.    Patient has a current master individualized treatment plan.  See Epic treatment plan for more information.    Referral / Collaboration:  Referral to another professional/service is not indicated at this time..  Emergency Services Needed?  No    Assignment:  Continue working on positive cycle    Interactive  Complexity:  There are four specific communication difficulties that complicate the work of the primary psychiatric procedure.  Interactive complexity (+83300) may be reported when at least one of these difficulties is present.    Communication difficulties present during current the psychiatric procedure include:  1. None.      Signature/Title:      Mason Adkins, PhD, LP    [place] [facility], Sexual [facility] Health Clinic  Department of Family Medicine and Community Health  [facility]

## 2023-08-17 ENCOUNTER — VIRTUAL VISIT (OUTPATIENT)
Dept: PSYCHOLOGY | Facility: CLINIC | Age: 68
End: 2023-08-17
Payer: COMMERCIAL

## 2023-08-17 DIAGNOSIS — F41.9 ANXIETY DISORDER, UNSPECIFIED TYPE: ICD-10-CM

## 2023-08-17 DIAGNOSIS — F91.8 CONDUCT DISORDER, UNDIFFERENTIATED TYPE: Primary | ICD-10-CM

## 2023-08-17 PROCEDURE — 90853 GROUP PSYCHOTHERAPY: CPT | Mod: VID | Performed by: STUDENT IN AN ORGANIZED HEALTH CARE EDUCATION/TRAINING PROGRAM

## 2023-08-17 NOTE — GROUP NOTE
[facility]  [address], [address]  [place]  [address]    Group Progress Note  [facility] - Progress Note    Date of Service: 23   Name: Ramin Murcia  : 1955  Medical Record Number: [id number]  START TIME:  4:00 PM  END TIME:  5:45 PM; client left group early  FACILITATOR(S): Mason Adkins, PhD  TOPIC: SGHC Group Therapy  Type of Session: Group  :  Number of Attendees:  7  Number of Minutes:  105    SERVICE MODALITY:  Video Visit:      Provider verified identity through the following two step process.  Patient provided:  Patient is known previously to provider    Telemedicine Visit: The patient's condition can be safely assessed and treated via synchronous audio and visual telemedicine encounter.      Reason for Telemedicine Visit: Services only offered telehealth    Originating Site (Patient Location): Patient's home    Distant Site (Provider Location): Provider Remote Setting- Home Office    Consent:  The patient/guardian has verbally consented to: the potential risks and benefits of telemedicine (video visit) versus in person care; bill my insurance or make self-payment for services provided; and responsibility for payment of non-covered services.     Patient would like the video invitation sent by:  Other e-mail: see chart    Mode of Communication:  Video Conference via Medical Zoom    Distant Location (Provider):  Off-site    As the provider I attest to compliance with applicable laws and regulations related to telemedicine.      DSM-5 Diagnoses:  Encounter Diagnoses   Name Primary?     Unspecified disruptive, impulse control, and conduct disorder (hypersexuality) Yes     Anxiety disorder, unspecified type          Current Reported Symptoms and Status update:  Changes since last session include no boundary violations, some risk situations, some anxiety/worry, increased socializing.    Patient's problems with out of control, driven,  impulsive, compulsive sexual behavior began in early 2020. He has been in a stable loving marriage for 40 years then began having covert hook-ups with young men.  Efforts to change problematic behaviors have included several years of psychotherapy and attenance in [facility]. He continues to struggle with recurrent and intense urges which cause him distress.     Client reported distress related to adverse childhood experiences that were traumatic. He attributes distress to ineffective coping, patterns of avoidance/numbing, and difficulties with functioning in relationships.      Progress Toward Treatment Goals:   Stable/Maintenance of progress     Therapeutic Interventions/Treatment Strategies:    Area(s) of treatment focus addressed in this session included Symptom Management, Interpersonal Relationship Skills and Sexual Health and Wellness    CBT and Interpersonal approaches were implemented in the group process in support of clients  treatment goals. Clients checked in regarding coping, boundaries, sexual health, and relationship functioning. Group members practiced interpersonal effectiveness by reflecting on each other s experiences and communicating emotions. Client shared how he is working to more effectively manage urges and risk situations. He processed the emotional toll of having to manage CSB long-term.    Psychotherapist offered support, feedback and validation and reinforced use of skills Treatment modalities used include [facility] THERAPY INTERVENTIONS: Cognitive Behavioral Therapy Interpersonal  Support and Feedback    Patient Response:   Patient responded to session by accepting feedback, giving feedback, listening, focusing on goals and accepting support  Possible barriers to participation / learning include: N/A    Current Mental Status Exam:   Appearance:  Appropriate   Eye Contact:  Good   Attitude / Demeanor: Interested  Speech      Rate / Production: Normal/ Responsive      Volume:  Normal   volume  Orientation:  All  Mood:   Normal  Affect:   Appropriate   Thought Content: Clear   Insight:   Good       Plan/Need for Future Services:  Return for therapy in 1 week to treat diagnosed problems.    Patient has a current master individualized treatment plan.  See Epic treatment plan for more information.    Referral / Collaboration:  Referral to another professional/service is not indicated at this time..  Emergency Services Needed?  No    Assignment:  Identify factors that make manage CSB challenging    Interactive Complexity:  There are four specific communication difficulties that complicate the work of the primary psychiatric procedure.  Interactive complexity (+86095) may be reported when at least one of these difficulties is present.    Communication difficulties present during current the psychiatric procedure include:  1. None.      Signature/Title:      Mason Adkins, PhD, LP    [place] [facility] Sexual [facility], Sexual and Gender Health Clinic  Department of Family Medicine and Community Health  [facility]

## 2023-08-24 ENCOUNTER — VIRTUAL VISIT (OUTPATIENT)
Dept: PSYCHOLOGY | Facility: CLINIC | Age: 68
End: 2023-08-24
Payer: COMMERCIAL

## 2023-08-24 DIAGNOSIS — F41.9 ANXIETY DISORDER, UNSPECIFIED TYPE: ICD-10-CM

## 2023-08-24 DIAGNOSIS — F91.8 CONDUCT DISORDER, UNDIFFERENTIATED TYPE: Primary | ICD-10-CM

## 2023-08-24 PROCEDURE — 90853 GROUP PSYCHOTHERAPY: CPT | Mod: VID | Performed by: STUDENT IN AN ORGANIZED HEALTH CARE EDUCATION/TRAINING PROGRAM

## 2023-08-24 NOTE — GROUP NOTE
[facility]  [address], [address]  [place]  [address]    Group Progress Note  [facility] Clinic - Progress Note    Date of Service: 23   Name: Ramin Murcia  : 1955  Medical Record Number: [id number]  START TIME:  4:00 PM  END TIME:  5:00 PM  FACILITATOR(S): Mason Adkins, PhD  TOPIC: SGHC Group Therapy  Type of Session: Group  :  Number of Attendees:  3  Number of Minutes:  60    SERVICE MODALITY:  Video Visit:      Provider verified identity through the following two step process.  Patient provided:  Patient is known previously to provider    Telemedicine Visit: The patient's condition can be safely assessed and treated via synchronous audio and visual telemedicine encounter.      Reason for Telemedicine Visit: Services only offered telehealth    Originating Site (Patient Location): Patient's home    Distant Site (Provider Location): [facility] Setting: Sexual and Gender Health Clinic    Consent:  The patient/guardian has verbally consented to: the potential risks and benefits of telemedicine (video visit) versus in person care; bill my insurance or make self-payment for services provided; and responsibility for payment of non-covered services.     Patient would like the video invitation sent by:  Other e-mail: see chart    Mode of Communication:  Video Conference via Medical Zoom    Distant Location (Provider):  On-site    As the provider I attest to compliance with applicable laws and regulations related to telemedicine.      DSM-5 Diagnoses:  Encounter Diagnoses   Name Primary?     Unspecified disruptive, impulse control, and conduct disorder (hypersexuality) Yes     Anxiety disorder, unspecified type          Current Reported Symptoms and Status update:  Changes since last session include no boundary violations, some risk situations, increased mindfulness, improvement in anxiety.    Patient's problems with out of control,  driven, impulsive, compulsive sexual behavior began in early 2020. He has been in a stable loving marriage for 40 years then began having covert hook-ups with young men.  Efforts to change problematic behaviors have included several years of psychotherapy and attenance in [facility]. He continues to struggle with recurrent and intense urges which cause him distress.     Client reported distress related to adverse childhood experiences that were traumatic. He attributes distress to ineffective coping, patterns of avoidance/numbing, and difficulties with functioning in relationships.      Progress Toward Treatment Goals:   Satisfactory progress     Therapeutic Interventions/Treatment Strategies:    Area(s) of treatment focus addressed in this session included Symptom Management, Interpersonal Relationship Skills and Sexual Health and Wellness    Group members shared updates pertaining to boundaries, healthy sexuality, and relationships/connectedness. Members shared progress toward treatment goals and provided each other support and recommendations for ongoing progress. Client shared ways in which he is working to improve his relationship with his son. He also processed ways in which he could use mindfulness to enhance self-control.  Psychotherapist offered support, feedback and validation and reinforced use of skills Treatment modalities used include [facility] THERAPY INTERVENTIONS: Cognitive Behavioral Therapy Interpersonal  Support and Feedback    Patient Response:   Patient responded to session by accepting feedback, giving feedback, listening, focusing on goals and accepting support  Possible barriers to participation / learning include: N/A    Current Mental Status Exam:   Appearance:  Appropriate   Eye Contact:  Good   Attitude / Demeanor: Cooperative   Speech      Rate / Production: Normal/ Responsive      Volume:  Normal  volume  Orientation:  All  Mood:   Normal  Affect:   Appropriate   Thought Content: Clear    Insight:   Good       Plan/Need for Future Services:  Return for therapy in 1 week to treat diagnosed problems.    Patient has a current master individualized treatment plan.  See Epic treatment plan for more information.    Referral / Collaboration:  Referral to another professional/service is not indicated at this time..  Emergency Services Needed?  No    Assignment:  Continue mindfulness practice    Interactive Complexity:  There are four specific communication difficulties that complicate the work of the primary psychiatric procedure.  Interactive complexity (+94590) may be reported when at least one of these difficulties is present.    Communication difficulties present during current the psychiatric procedure include:  1. None.      Signature/Title:      Mason Adkins, PhD, LP    [place] for Sexual and Gender [facility], Sexual and Gender Health Clinic  Department of Family Medicine and Community Health  [facility] [employer]

## 2023-08-28 ENCOUNTER — VIRTUAL VISIT (OUTPATIENT)
Dept: PSYCHOLOGY | Facility: CLINIC | Age: 68
End: 2023-08-28
Payer: COMMERCIAL

## 2023-08-28 DIAGNOSIS — F41.9 ANXIETY DISORDER, UNSPECIFIED TYPE: ICD-10-CM

## 2023-08-28 DIAGNOSIS — F91.8 CONDUCT DISORDER, UNDIFFERENTIATED TYPE: Primary | ICD-10-CM

## 2023-08-28 PROCEDURE — 90834 PSYTX W PT 45 MINUTES: CPT | Mod: VID | Performed by: STUDENT IN AN ORGANIZED HEALTH CARE EDUCATION/TRAINING PROGRAM

## 2023-08-28 NOTE — PROGRESS NOTES
[place] for Sexual [facility] - Progress Note    Date of Service: 23   Name: Ramin Murcia  : 1955  Medical Record Number: [id number]  Treating Provider: Mason Adkins, PhD  Type of Session: Individual  Present in Session: Client and therapist  Session Start and Stop Time: 10:00am-10:45am - client's video cutout during session. Client called therapist and stated he can end early.   Number of Minutes:  45    SERVICE MODALITY:  Video Visit:      Provider verified identity through the following two step process.  Patient provided:  Patient is known previously to provider and Patient was verified at admission/transfer    Telemedicine Visit: The patient's condition can be safely assessed and treated via synchronous audio and visual telemedicine encounter.      Reason for Telemedicine Visit: Services only offered telehealth    Originating Site (Patient Location): Patient's home    Distant Site (Provider Location): [facility]    Consent:  The patient/guardian has verbally consented to: the potential risks and benefits of telemedicine (video visit) versus in person care; bill my insurance or make self-payment for services provided; and responsibility for payment of non-covered services.     Patient would like the video invitation sent by:  My Chart    Mode of Communication:  Video Conference via Amwell    Distant Location (Provider):  On-site    As the provider I attest to compliance with applicable laws and regulations related to telemedicine.    DSM-5 Diagnoses:  Encounter Diagnoses   Name Primary?     Unspecified disruptive, impulse control, and conduct disorder (hypersexuality) Yes     Anxiety disorder, unspecified type        Current Reported Symptoms and Status update:  Changes since last session include no boundary violations, recognizing and changing high-risk situations, conflict with partner, some anxiety related to relationships.    Patient's problems  with out of control, driven, impulsive, compulsive sexual behavior began in early 2020. He has been in a stable loving marriage for 40 years then began having covert hook-ups with young men.  Efforts to change problematic behaviors have included several years of psychotherapy and attenance in [facility]. He continues to struggle with recurrent and intense urges which cause him distress.     Client reported distress related to adverse childhood experiences that were traumatic. He attributes distress to ineffective coping, patterns of avoidance/numbing, and difficulties with functioning in relationships.      Progress Toward Treatment Goals:   Satisfactory progress     Transferred care from Dr. Bacon to Dr. Adkins 11/10/22  Working to understand antecedents, risks, and urges  Working to understand negative cycles  Collateral session with client's wife, Elizabeth 1/12/23  Started written exposure therapy for trauma 2/27/23  Completed written exposure therapy 4/3/23. PCL scores went from 30 to 16 - significant reduction in symptoms.  Collateral session with Elizabeth 5/1/23  DA and treatment plan updated 7/18/23    Therapeutic Interventions/Treatment Strategies:    Area(s) of treatment focus addressed in this session included Symptom Management, Interpersonal Relationship Skills and Sexual Health and Wellness    Client processed a recent emotional flooding experience between his wife and himself. We processed factors that might contribute to emotional reactions to past traumas. We explored ways in which client could work to help his wife feel emotionally safe. We also challenged client's automatic thoughts about himself and the relationship. Client identified ways in which he plans to communicate with his wife about these concerns. Therapist supported client to use alternative and more effective communication strategies.    Psychotherapist offered support, feedback and validation and reinforced use of skills Treatment  modalities used include Cognitive Behavioral Therapy Interpersonal  Support, Redirection and Feedback    Patient Response:   Patient responded to session by accepting feedback, giving feedback, listening, focusing on goals and accepting support  Possible barriers to participation / learning include: N/A    Current Mental Status Exam:   Appearance:  Appropriate   Eye Contact:  Good   Attitude / Demeanor: Cooperative   Speech      Rate / Production: Normal/ Responsive      Volume:  Normal  volume  Orientation:  All  Mood:   Normal  Affect:   Appropriate   Thought Content: Clear   Insight:   Good       Plan/Need for Future Services:  Return for therapy in 2 weeks to treat diagnosed problems.    Patient has a current master individualized treatment plan.  See Epic treatment plan for more information.    Referral / Collaboration:  Referral to another professional/service is not indicated at this time..  Emergency Services Needed?  No    Assignment:  Try alternative communication strategies with [place]    Interactive Complexity:  There are four specific communication difficulties that complicate the work of the primary psychiatric procedure.  Interactive complexity (+77938) may be reported when at least one of these difficulties is present.    Communication difficulties present during current the psychiatric procedure include:  1. None.      Signature/Title:    Mason Adkins, PhD, LP    [place] for Sexual and Gender Health, Sexual and Gender Health Clinic  Department of Family Medicine and Community Health  [employer] [facility] [employer]

## 2023-08-30 ENCOUNTER — TELEPHONE (OUTPATIENT)
Dept: PSYCHOLOGY | Facility: CLINIC | Age: 68
End: 2023-08-30
Payer: COMMERCIAL

## 2023-08-30 NOTE — TELEPHONE ENCOUNTER
This provider received a request from client's insurance company ([employer]) to conduct a [id number] review. Provider left a voicemail at [phone] (the number provided to this writer) providing requested information to schedule a [id number] review. Provided dates of 9/8 and 9/15 to schedule the review.

## 2023-08-31 ENCOUNTER — VIRTUAL VISIT (OUTPATIENT)
Dept: PSYCHOLOGY | Facility: CLINIC | Age: 68
End: 2023-08-31
Payer: COMMERCIAL

## 2023-08-31 DIAGNOSIS — F41.9 ANXIETY DISORDER, UNSPECIFIED TYPE: ICD-10-CM

## 2023-08-31 DIAGNOSIS — F91.8 CONDUCT DISORDER, UNDIFFERENTIATED TYPE: Primary | ICD-10-CM

## 2023-08-31 PROCEDURE — 90853 GROUP PSYCHOTHERAPY: CPT | Mod: VID | Performed by: STUDENT IN AN ORGANIZED HEALTH CARE EDUCATION/TRAINING PROGRAM

## 2023-08-31 NOTE — GROUP NOTE
[facility]  [address], [address]  [place]  [address]    Group Progress Note  [facility] - Progress Note    Date of Service: 23   Name: Ramin Murcia  : 1955  Medical Record Number: [id number]  START TIME:  4:00 PM  END TIME:  5:05 PM  FACILITATOR(S): Mason Adkins, PhD  TOPIC: [facility] Group Therapy  Type of Session: Group  :  Number of Attendees:  3  Number of Minutes:  65    SERVICE MODALITY:  Video Visit:      Provider verified identity through the following two step process.  Patient provided:  Patient is known previously to provider    Telemedicine Visit: The patient's condition can be safely assessed and treated via synchronous audio and visual telemedicine encounter.      Reason for Telemedicine Visit: Services only offered telehealth    Originating Site (Patient Location): Patient's home    Distant Site (Provider Location): [facility] Clinics: Sexual and Gender Health Clinic    Consent:  The patient/guardian has verbally consented to: the potential risks and benefits of telemedicine (video visit) versus in person care; bill my insurance or make self-payment for services provided; and responsibility for payment of non-covered services.     Patient would like the video invitation sent by:  Other e-mail: see chart    Mode of Communication:  Video Conference via Medical Zoom    Distant Location (Provider):  On-site    As the provider I attest to compliance with applicable laws and regulations related to telemedicine.      DSM-5 Diagnoses:  Encounter Diagnoses   Name Primary?     Unspecified disruptive, impulse control, and conduct disorder (hypersexuality) Yes     Anxiety disorder, unspecified type          Current Reported Symptoms and Status update:  Changes since last session include no boundary violations, some risk situations, improving conflict with his wife, anxiety reasonably controlled with treatment.    Patient's problems  with out of control, driven, impulsive, compulsive sexual behavior began in early 2020. He has been in a stable loving marriage for 40 years then began having covert hook-ups with young men.  Efforts to change problematic behaviors have included several years of psychotherapy and attenance in [facility]. He continues to struggle with recurrent and intense urges which cause him distress.     Client reports experiencing some anxiety/worry and associated distress. He attributes distress to ineffective coping, patterns of avoidance/numbing, and difficulties with functioning in relationships.      Progress Toward Treatment Goals:   Satisfactory progress     Therapeutic Interventions/Treatment Strategies:    Area(s) of treatment focus addressed in this session included Symptom Management, Interpersonal Relationship Skills and Sexual Health and Wellness    Group members provided updates regarding treatment progress, maintaining boundaries, coping, sexual health, relationships, and risk situations or boundary violations. Members provided each other with support and feedback regarding treatment progress. Members discussed impacts of CSB concerns on relationships, perspective taking, and effective relationship/communication skills. Client shared a recent discussion he had with his wife in which he practiced perspective taking and acknowledging her relationship safety concerns. Client reported this worked well and he was better able to connect with her feelings.   Psychotherapist offered support, feedback and validation and reinforced use of skills Treatment modalities used include [facility] THERAPY INTERVENTIONS: Cognitive Behavioral Therapy  Support, Feedback and Problem Solving    Patient Response:   Patient responded to session by accepting feedback, giving feedback, listening, focusing on goals and accepting support  Possible barriers to participation / learning include: N/A    Current Mental Status Exam:   Appearance:  Appropriate    Eye Contact:  Good   Attitude / Demeanor: Cooperative   Speech      Rate / Production: Normal/ Responsive      Volume:  Normal  volume  Orientation:  All  Mood:   Normal  Affect:   Appropriate   Thought Content: Clear   Insight:   Good       Plan/Need for Future Services:  Return for therapy in 1 week to treat diagnosed problems.    Patient has a current master individualized treatment plan.  See Epic treatment plan for more information.    Referral / Collaboration:  Referral to another professional/service is not indicated at this time..  Emergency Services Needed?  No    Assignment:  Continue practicing effective communication strategies    Interactive Complexity:  There are four specific communication difficulties that complicate the work of the primary psychiatric procedure.  Interactive complexity (+26460) may be reported when at least one of these difficulties is present.    Communication difficulties present during current the psychiatric procedure include:  1. None.      Signature/Title:      Mason Adkins, PhD, LP    [place] [facility], [facility] Clinic  Department of Family Medicine and Community Health  [employer] [employer]

## 2023-09-08 ENCOUNTER — TELEPHONE (OUTPATIENT)
Dept: PSYCHOLOGY | Facility: CLINIC | Age: 68
End: 2023-09-08
Payer: COMMERCIAL

## 2023-09-08 NOTE — TELEPHONE ENCOUNTER
[facility] initiated a [id number] review to assess coverage needs and authorization for continued treatment. After verifying pt information, therapist provided pertinent clinical information and need for continue care. Reviewer stated he would authorize continued weekly group therapy and every-other week [id number].

## 2023-09-14 ENCOUNTER — VIRTUAL VISIT (OUTPATIENT)
Dept: PSYCHOLOGY | Facility: CLINIC | Age: 68
End: 2023-09-14
Payer: COMMERCIAL

## 2023-09-14 DIAGNOSIS — F91.8 CONDUCT DISORDER, UNDIFFERENTIATED TYPE: Primary | ICD-10-CM

## 2023-09-14 DIAGNOSIS — F41.9 ANXIETY DISORDER, UNSPECIFIED TYPE: ICD-10-CM

## 2023-09-14 PROCEDURE — 90853 GROUP PSYCHOTHERAPY: CPT | Mod: VID | Performed by: STUDENT IN AN ORGANIZED HEALTH CARE EDUCATION/TRAINING PROGRAM

## 2023-09-14 NOTE — GROUP NOTE
[facility]  [address], [address]  [place]  [address]    Group Progress Note  [facility] - Progress Note    Date of Service: 23   Name: Ramin Murcia  : 1955  Medical Record Number: [id number]  START TIME:  4:00 PM  END TIME:  6:00 PM  FACILITATOR(S): Maosn Adkins, PhD  TOPIC: SGHC Group Therapy  Type of Session: Group  :  Number of Attendees:  8  Number of Minutes:  120    SERVICE MODALITY:  Video Visit:      Provider verified identity through the following two step process.  Patient provided:  Patient is known previously to provider    Telemedicine Visit: The patient's condition can be safely assessed and treated via synchronous audio and visual telemedicine encounter.      Reason for Telemedicine Visit: Services only offered telehealth    Originating Site (Patient Location): Patient's home    Distant Site (Provider Location): [facility] Clinics: Sexual and Gender Health Clinic    Consent:  The patient/guardian has verbally consented to: the potential risks and benefits of telemedicine (video visit) versus in person care; bill my insurance or make self-payment for services provided; and responsibility for payment of non-covered services.     Patient would like the video invitation sent by:  Other e-mail: see chart    Mode of Communication:  Video Conference via Medical Zoom    Distant Location (Provider):  On-site    As the provider I attest to compliance with applicable laws and regulations related to telemedicine.      DSM-5 Diagnoses:  Encounter Diagnoses   Name Primary?     Unspecified disruptive, impulse control, and conduct disorder (hypersexuality) Yes     Anxiety disorder, unspecified type          Current Reported Symptoms and Status update:  Changes since last session include sexual compulsivity, relationship problems, reduced alcohol use, anxiety/worry.    Patient's problems with out of control, driven, impulsive,  compulsive sexual behavior began in early 2020. He has been in a stable loving marriage for 40 years then began having covert hook-ups with young men.  Efforts to change problematic behaviors have included several years of psychotherapy and attenance in [facility]. He continues to struggle with recurrent and intense urges which cause him distress.     Client reports experiencing some anxiety/worry and associated distress. He attributes distress to ineffective coping, patterns of avoidance/numbing, and difficulties with functioning in relationships.      Progress Toward Treatment Goals:   Satisfactory progress     Therapeutic Interventions/Treatment Strategies:    Area(s) of treatment focus addressed in this session included Symptom Management, Interpersonal Relationship Skills and Sexual Health and Wellness    Group members checked-in regarding sexual health, boundaries, relationship functioning, and coping. Members provided updates regarding treatment progress. Clients provided each other supportive feedback to help members continue to make treatment progress. Client shared how he is working to reconnect with people important to him. He shared reflections on how CSB has impacted his relationships, and ways in which he is reorienting to intimacy repair. He was receptive to feedback from others.  Psychotherapist offered support, feedback and validation and reinforced use of skills Treatment modalities used include [facility] THERAPY INTERVENTIONS: Cognitive Behavioral Therapy Interpersonal  Support and Feedback    Patient Response:   Patient responded to session by accepting feedback, giving feedback, listening, focusing on goals and accepting support  Possible barriers to participation / learning include: N/A    Current Mental Status Exam:   Appearance:  Appropriate   Eye Contact:  Good   Attitude / Demeanor: Cooperative   Speech      Rate / Production: Normal/ Responsive      Volume:  Normal   volume  Orientation:  All  Mood:   Normal  Affect:   Appropriate   Thought Content: Clear   Insight:   Good       Plan/Need for Future Services:  Return for therapy in 1 week to treat diagnosed problems.    Patient has a current master individualized treatment plan.  See Epic treatment plan for more information.    Referral / Collaboration:  Referral to another professional/service is not indicated at this time..  Emergency Services Needed?  No    Assignment:  Continue integrating intimacy repair into his positive cycle    Interactive Complexity:  There are four specific communication difficulties that complicate the work of the primary psychiatric procedure.  Interactive complexity (+54016) may be reported when at least one of these difficulties is present.    Communication difficulties present during current the psychiatric procedure include:  1. None.      Signature/Title:    Mason Adkins, PhD, LP    [place] [facility], Sexual and Gender Health Clinic  Department of Family Medicine and Community Health  [employer] [facility] [employer]

## 2023-09-19 ENCOUNTER — VIRTUAL VISIT (OUTPATIENT)
Dept: PSYCHOLOGY | Facility: CLINIC | Age: 68
End: 2023-09-19
Payer: COMMERCIAL

## 2023-09-19 DIAGNOSIS — F41.9 ANXIETY DISORDER, UNSPECIFIED TYPE: ICD-10-CM

## 2023-09-19 DIAGNOSIS — F91.8 CONDUCT DISORDER, UNDIFFERENTIATED TYPE: Primary | ICD-10-CM

## 2023-09-19 PROCEDURE — 90834 PSYTX W PT 45 MINUTES: CPT | Mod: VID | Performed by: STUDENT IN AN ORGANIZED HEALTH CARE EDUCATION/TRAINING PROGRAM

## 2023-09-19 NOTE — NURSING NOTE
Is the patient currently in the state of [place]? YES    Visit mode:VIDEO    If the visit is dropped, the patient can be reconnected by: VIDEO VISIT:  Send e-mail to at [email or web address]    Will anyone else be joining the visit? No  (If patient encounters technical issues they should call [phone])    How would you like to obtain your AVS? MyChart    Are changes needed to the allergy or medication list? N/A    Rooming Documentation: Questionnaire(s) not done per department protocol.    Reason for visit: SAMREEN Thompson

## 2023-09-19 NOTE — PROGRESS NOTES
[place] for Sexual [facility] - Progress Note    Date of Service: 23   Name: Ramin Murcia  : 1955  Medical Record Number: [id number]  Treating Provider:       Mason Adkins, Ph.D., LP  Type of Session: Individual  Present in Session: Client and therapist  Session Start and Stop Time: 9:00am-9:45am  Number of Minutes:  45    SERVICE MODALITY:  Video Visit:      Provider verified identity through the following two step process.  Patient provided:  Patient is known previously to provider and Patient was verified at admission/transfer    Telemedicine Visit: The patient's condition can be safely assessed and treated via synchronous audio and visual telemedicine encounter.      Reason for Telemedicine Visit: Patient has requested telehealth visit    Originating Site (Patient Location): Patient's home    Distant Site (Provider Location): [facility] S[facility]    Consent:  The patient/guardian has verbally consented to: the potential risks and benefits of telemedicine (video visit) versus in person care; bill my insurance or make self-payment for services provided; and responsibility for payment of non-covered services.     Patient would like the video invitation sent by:  Send to e-mail at: [email or web address]    Mode of Communication:  Video Conference via Amwell    Distant Location (Provider):  On-site    As the provider I attest to compliance with applicable laws and regulations related to telemedicine.    DSM-5 Diagnoses:  Encounter Diagnoses   Name Primary?     Unspecified disruptive, impulse control, and conduct disorder (hypersexuality) Yes     Anxiety disorder, unspecified type      Current Reported Symptoms and Status update:  Changes since last session include recent COVID dx, worry/anxiety about relationship, no boundary violations, recognizing risk situations and behavior.    Patient's problems with out of control, driven, impulsive, compulsive sexual  behavior began in early 2020. He has been in a stable loving marriage for 40 years then began having covert hook-ups with young men.  Efforts to change problematic behaviors have included several years of psychotherapy and attenance in [facility]. He continues to struggle with recurrent and intense urges which cause him distress.     Client reports experiencing some anxiety/worry and associated distress. He attributes distress to ineffective coping, patterns of avoidance/numbing, and difficulties with functioning in relationships.      Progress Toward Treatment Goals:   Satisfactory progress     Transferred care from Dr. Bacon to Dr. Adkins 11/10/22  Working to understand antecedents, risks, and urges  Working to understand negative cycles  Collateral session with client's wife, Elizabeth 1/12/23  Started written exposure therapy for trauma 2/27/23  Completed written exposure therapy 4/3/23. PCL scores went from 30 to 16 - significant reduction in symptoms.  Collateral session with Elizabeth 5/1/23  DA and treatment plan updated 7/18/23    Therapeutic Interventions/Treatment Strategies:    Area(s) of treatment focus addressed in this session included Symptom Management, Interpersonal Relationship Skills and Sexual Health and Wellness    Client processed difficult interactions with his wife and ways in which he feels angry by their conversations. He identified recent fights centering on his appearance. Therapist supported client to identify emotions and core beliefs related to these conflicts. We practiced ways in which he could more effectively express anger. We discussed ways in which client could work on emotional connection with his wife.    Psychotherapist offered support, feedback and validation and reinforced use of skills Treatment modalities used include Cognitive Behavioral Therapy  Support, Feedback and Problem Solving    Patient Response:   Patient responded to session by accepting feedback, giving  feedback, listening, focusing on goals and accepting support  Possible barriers to participation / learning include: N/A    Current Mental Status Exam:   Appearance:  Appropriate   Eye Contact:  Good   Attitude / Demeanor: Cooperative   Speech      Rate / Production: Normal/ Responsive      Volume:  Normal  volume  Orientation:  All  Mood:   Normal  Affect:   Appropriate   Thought Content: Clear   Insight:   Good       Plan/Need for Future Services:  Return for therapy in 2 weeks to treat diagnosed problems.  Continue weekly treatment group.  Patient has an initial individualized treatment plan that was created as part of their diagnostic assessment / admission process.  A master individualized treatment plan is in the process of being developed with the patient and multi-disciplinary care team.    Referral / Collaboration:  Referral to another professional/service is not indicated at this time..  Emergency Services Needed?  No    Assignment:  Log emotions    Interactive Complexity:  There are four specific communication difficulties that complicate the work of the primary psychiatric procedure.  Interactive complexity (+32914) may be reported when at least one of these difficulties is present.    Communication difficulties present during current the psychiatric procedure include:  1. None.      Signature/Title:    Mason Adkins, PhD, LP    [place] for Sexual [facility] Gender [facility], Sexual and Gender Health Clinic  Department of Family Medicine and Community Health  [employer] [facility] [employer]

## 2023-09-21 ENCOUNTER — VIRTUAL VISIT (OUTPATIENT)
Dept: PSYCHOLOGY | Facility: CLINIC | Age: 68
End: 2023-09-21
Payer: COMMERCIAL

## 2023-09-21 DIAGNOSIS — F41.9 ANXIETY DISORDER, UNSPECIFIED TYPE: ICD-10-CM

## 2023-09-21 DIAGNOSIS — F91.8 CONDUCT DISORDER, UNDIFFERENTIATED TYPE: Primary | ICD-10-CM

## 2023-09-21 PROCEDURE — 90853 GROUP PSYCHOTHERAPY: CPT | Mod: VID | Performed by: STUDENT IN AN ORGANIZED HEALTH CARE EDUCATION/TRAINING PROGRAM

## 2023-09-21 NOTE — GROUP NOTE
[facility]  [address], [address]  [place]  [address]    Group Progress Note  [facility] - Progress Note    Date of Service: 23   Name: Ramin Murcia  : 1955  Medical Record Number: [id number]  START TIME:  4:00 PM  END TIME:  6:00 PM  FACILITATOR(S): Mason Adkins, PhD  TOPIC: [facility]HC Group Therapy  Type of Session: Group  :  Number of Attendees:  6  Number of Minutes:  120    Psychology intern Dior Baltazar attended with group's consent    SERVICE MODALITY:  Video Visit:      Provider verified identity through the following two step process.  Patient provided:  Patient is known previously to provider    Telemedicine Visit: The patient's condition can be safely assessed and treated via synchronous audio and visual telemedicine encounter.      Reason for Telemedicine Visit: Services only offered telehealth    Originating Site (Patient Location): Patient's home    Distant Site (Provider Location): [facility] Clinics: Sexual and Gender Health Clinic    Consent:  The patient/guardian has verbally consented to: the potential risks and benefits of telemedicine (video visit) versus in person care; bill my insurance or make self-payment for services provided; and responsibility for payment of non-covered services.     Patient would like the video invitation sent by:  Other e-mail: see chart    Mode of Communication:  Video Conference via Medical Zoom    Distant Location (Provider):  On-site    As the provider I attest to compliance with applicable laws and regulations related to telemedicine.      DSM-5 Diagnoses:  Encounter Diagnoses   Name Primary?     Unspecified disruptive, impulse control, and conduct disorder (hypersexuality) Yes     Anxiety disorder, unspecified type          Current Reported Symptoms and Status update:  Changes since last session: no boundary violations, some urges, recent COVID dx, some stress/anxiety in  relationships.    Patient's problems with out of control, driven, impulsive, compulsive sexual behavior began in early 2020. He has been in a stable loving marriage for 40 years then began having covert hook-ups with young men.  Efforts to change problematic behaviors have included several years of psychotherapy and attenance in [facility]. He continues to struggle with recurrent and intense urges which cause him distress.     Client reports experiencing some anxiety/worry and associated distress. He attributes distress to ineffective coping, patterns of avoidance/numbing, and difficulties with functioning in relationships.      Progress Toward Treatment Goals:   Satisfactory progress     Therapeutic Interventions/Treatment Strategies:    Area(s) of treatment focus addressed in this session included Symptom Management, Interpersonal Relationship Skills and Sexual Health and Wellness    Group members checked in regarding boundaries, sexual compulsivity, relationship health, sexual health, substance use, mental health, and coping. Group members shared progress toward treatment goals and assigned homework. Members provided each other support and suggestions. Client processed feelings of anger experienced by him and his wife. He reflected on ways in which he experiences defensiveness and anger, leading to conflict. He worked with the group to generate alternative reactions.  Psychotherapist offered support, feedback and validation and reinforced use of skills Treatment modalities used include [facility] THERAPY INTERVENTIONS: Cognitive Behavioral Therapy Interpersonal  Support, Feedback and Problem Solving    Patient Response:   Patient responded to session by accepting feedback, giving feedback, listening, focusing on goals and accepting support  Possible barriers to participation / learning include: N/A    Current Mental Status Exam:   Appearance:  Appropriate   Eye Contact:  Good   Attitude / Demeanor: Cooperative   Speech       Rate / Production: Normal/ Responsive      Volume:  Normal  volume  Orientation:  All  Mood:   Normal  Affect:   Appropriate   Thought Content: Clear   Insight:   Good       Plan/Need for Future Services:  Return for therapy in 1 week to treat diagnosed problems.    Patient has a current master individualized treatment plan.  See Epic treatment plan for more information.    Referral / Collaboration:  Referral to another professional/service is not indicated at this time..  Emergency Services Needed?  No    Assignment:  Practice communication/assertiveness skills.    Interactive Complexity:  There are four specific communication difficulties that complicate the work of the primary psychiatric procedure.  Interactive complexity (+56879) may be reported when at least one of these difficulties is present.    Communication difficulties present during current the psychiatric procedure include:  1. None.      Signature/Title:      Mason Adkins, PhD, LP    [place] for Sexual [facility] Gender [facility], Sexual and Gender Health Clinic  Department of Family Medicine and Community Health  [employer] [facility] [employer]

## 2023-10-05 ENCOUNTER — VIRTUAL VISIT (OUTPATIENT)
Dept: PSYCHOLOGY | Facility: CLINIC | Age: 68
End: 2023-10-05
Payer: COMMERCIAL

## 2023-10-05 DIAGNOSIS — F91.8 CONDUCT DISORDER, UNDIFFERENTIATED TYPE: Primary | ICD-10-CM

## 2023-10-05 DIAGNOSIS — F41.9 ANXIETY DISORDER, UNSPECIFIED TYPE: ICD-10-CM

## 2023-10-05 PROCEDURE — 90853 GROUP PSYCHOTHERAPY: CPT | Mod: VID | Performed by: STUDENT IN AN ORGANIZED HEALTH CARE EDUCATION/TRAINING PROGRAM

## 2023-10-05 NOTE — GROUP NOTE
[facility]  [address], [address]  [place]  [address]    Group Progress Note  [facility] - Progress Note    Date of Service: 10/05/23   Name: Ramin Murcia  : 1955  Medical Record Number: [id number]  START TIME:  4:00 PM  END TIME:  6:00 PM  FACILITATOR(S): Mason Adkins, PhD  TOPIC: [facility] Group Therapy  Type of Session: Group  :  Number of Attendees:  7  Number of Minutes:  120    Group co-led by psychology intern Dior Baltazar    SERVICE MODALITY:  Video Visit:      Provider verified identity through the following two step process.  Patient provided:  Patient is known previously to provider    Telemedicine Visit: The patient's condition can be safely assessed and treated via synchronous audio and visual telemedicine encounter.      Reason for Telemedicine Visit: Services only offered telehealth    Originating Site (Patient Location): Patient's home    Distant Site (Provider Location): [facility] Clinics: Sexual and Gender Health Clinic    Consent:  The patient/guardian has verbally consented to: the potential risks and benefits of telemedicine (video visit) versus in person care; bill my insurance or make self-payment for services provided; and responsibility for payment of non-covered services.     Patient would like the video invitation sent by:  Other e-mail: see chart    Mode of Communication:  Video Conference via Medical Zoom    Distant Location (Provider):  On-site    As the provider I attest to compliance with applicable laws and regulations related to telemedicine.      DSM-5 Diagnoses:  Encounter Diagnoses   Name Primary?     Unspecified disruptive, impulse control, and conduct disorder (hypersexuality) Yes     Anxiety disorder, unspecified type          Current Reported Symptoms and Status update:  Changes since last session include no boundary violations, stopping alcohol use, some stress/anxiety.    Patient's problems with out of  control, driven, impulsive, compulsive sexual behavior began in early 2020. He has been in a stable loving marriage for 40 years then began having covert hook-ups with young men.  Efforts to change problematic behaviors have included several years of psychotherapy and attenance in [facility]. He continues to struggle with recurrent and intense urges which cause him distress.     Client reports experiencing some anxiety/worry and associated distress. He attributes distress to ineffective coping, patterns of avoidance/numbing, and difficulties with functioning in relationships.      Progress Toward Treatment Goals:   Satisfactory progress     Therapeutic Interventions/Treatment Strategies:    Area(s) of treatment focus addressed in this session included Symptom Management, Interpersonal Relationship Skills and Sexual Health and Wellness    Cognitive Behavioral Therapy and Interpersonal Therapy interventions were implemented in the group process. Members shared current status/progress of sexual and relationship health, mental health, substance use, and overall health symptoms. Members also shared ways in which they are coping more effectively. Members provided each other supported feedback. Client shared ways in which he is conceptualizing alcohol use into his cycles. He stopped alcohol use currently as ways of reducing risk situations.  Psychotherapist offered support, feedback and validation and reinforced use of skills Treatment modalities used include [facility] THERAPY INTERVENTIONS: Cognitive Behavioral Therapy Interpersonal  Support and Feedback    Patient Response:   Patient responded to session by accepting feedback, giving feedback, listening, focusing on goals and accepting support  Possible barriers to participation / learning include: N/A    Current Mental Status Exam:   Appearance:  Appropriate   Eye Contact:  Good   Attitude / Demeanor: Cooperative   Speech      Rate / Production: Normal/ Responsive       Volume:  Normal  volume  Orientation:  All  Mood:   Normal  Affect:   Appropriate   Thought Content: Clear   Insight:   Good       Plan/Need for Future Services:  Return for therapy in 1 week to treat diagnosed problems.    Patient has a current master individualized treatment plan.  See Epic treatment plan for more information.    Referral / Collaboration:  Referral to another professional/service is not indicated at this time..  Emergency Services Needed?  No    Assignment:  Try period of not using alcohol    Interactive Complexity:  There are four specific communication difficulties that complicate the work of the primary psychiatric procedure.  Interactive complexity (+30210) may be reported when at least one of these difficulties is present.    Communication difficulties present during current the psychiatric procedure include:  1. None.      Signature/Title:    Mason Adkins, PhD, LP    [place] [facility], [facility]  Department of Family Medicine and Community Health  [employer]

## 2023-10-12 ENCOUNTER — VIRTUAL VISIT (OUTPATIENT)
Dept: PSYCHOLOGY | Facility: CLINIC | Age: 68
End: 2023-10-12
Payer: COMMERCIAL

## 2023-10-12 DIAGNOSIS — F41.9 ANXIETY DISORDER, UNSPECIFIED TYPE: ICD-10-CM

## 2023-10-12 DIAGNOSIS — F91.8 CONDUCT DISORDER, UNDIFFERENTIATED TYPE: Primary | ICD-10-CM

## 2023-10-12 PROCEDURE — 90853 GROUP PSYCHOTHERAPY: CPT | Mod: VID | Performed by: STUDENT IN AN ORGANIZED HEALTH CARE EDUCATION/TRAINING PROGRAM

## 2023-10-12 NOTE — GROUP NOTE
[facility]  [address], [address]  [place]  [address]    Group Progress Note  [facility] - Progress Note    Date of Service: 10/12/23   Name: Ramin Murcia  : 1955  Medical Record Number: [id number]  START TIME:  4:00 PM  END TIME:  6:00 PM  FACILITATOR(S): Mason Adkins, PhD; Dior Baltazar  TOPIC: [facility] Group Therapy  Type of Session: Group  :  Number of Attendees:  7  Number of Minutes:  120    Medical Student Brittanie Bird shadowed today's group with group permission.    SERVICE MODALITY:  Video Visit:      Provider verified identity through the following two step process.  Patient provided:  Patient is known previously to provider and Patient was verified at admission/transfer    Telemedicine Visit: The patient's condition can be safely assessed and treated via synchronous audio and visual telemedicine encounter.      Reason for Telemedicine Visit: Services only offered telehealth    Originating Site (Patient Location): Patient's home    Distant Site (Provider Location): [facility] Clinics: Sexual and Gender Health Clinic    Consent:  The patient/guardian has verbally consented to: the potential risks and benefits of telemedicine (video visit) versus in person care; bill my insurance or make self-payment for services provided; and responsibility for payment of non-covered services.     Patient would like the video invitation sent by:  Other e-mail: see chart    Mode of Communication:  Video Conference via Medical Zoom    Distant Location (Provider):  On-site    As the provider I attest to compliance with applicable laws and regulations related to telemedicine.      DSM-5 Diagnoses:  Encounter Diagnoses   Name Primary?     Unspecified disruptive, impulse control, and conduct disorder (hypersexuality) Yes     Anxiety disorder, unspecified type          Current Reported Symptoms and Status update:  Changes since last session include no  boundary violations, working on sexual/relationship health, anxiety and worry.    Patient's problems with out of control, driven, impulsive, compulsive sexual behavior began in early 2020. He has been in a stable loving marriage for 40 years then began having covert hook-ups with young men.  Efforts to change problematic behaviors have included several years of psychotherapy and attenance in [facility]. He continues to struggle with recurrent and intense urges which cause him distress.     Client reports experiencing some anxiety/worry and associated distress. He attributes distress to ineffective coping, patterns of avoidance/numbing, and difficulties with functioning in relationships.      Progress Toward Treatment Goals:   Satisfactory progress     Therapeutic Interventions/Treatment Strategies:    Area(s) of treatment focus addressed in this session included Symptom Management, Sexual Health and Wellness and Gender Health    Group members shared treatment progress, effective coping skills, sexual health, and mental health updates. Members also shared homework tasks they have been working on. Interpersonal approaches were supported to foster support and communication/relationship skills. Client processed ways in which he and his wife are establishing and navigating boundaries. He reflected on how he is working on coping and communication.   Psychotherapist offered support, feedback and validation and reinforced use of skills Treatment modalities used include [facility] THERAPY INTERVENTIONS: Cognitive Behavioral Therapy Interpersonal  Support and Feedback    Patient Response:   Patient responded to session by accepting feedback, giving feedback, listening, focusing on goals and accepting support  Possible barriers to participation / learning include: N/A    Current Mental Status Exam:   Appearance:  Appropriate   Eye Contact:  Good   Attitude / Demeanor: Cooperative   Speech      Rate / Production: Normal/ Responsive       Volume:  Normal  volume  Orientation:  All  Mood:   Normal  Affect:   Appropriate   Thought Content: Clear   Insight:   Good       Plan/Need for Future Services:  Return for therapy in 1 week to treat diagnosed problems.    Patient has a current master individualized treatment plan.  See Epic treatment plan for more information.    Referral / Collaboration:  Referral to another professional/service is not indicated at this time..  Emergency Services Needed?  No    Assignment:  Continue working on boundaries and coping    Interactive Complexity:  There are four specific communication difficulties that complicate the work of the primary psychiatric procedure.  Interactive complexity (+59882) may be reported when at least one of these difficulties is present.    Communication difficulties present during current the psychiatric procedure include:  1. None.      Signature/Title:      Mason Adkins, PhD, LP    [place] [facility], Sexual and Gender Health Clinic  Department of Family Medicine and Community Health  [employer]

## 2023-10-19 ENCOUNTER — VIRTUAL VISIT (OUTPATIENT)
Dept: PSYCHOLOGY | Facility: CLINIC | Age: 68
End: 2023-10-19
Payer: COMMERCIAL

## 2023-10-19 ENCOUNTER — OFFICE VISIT (OUTPATIENT)
Dept: PSYCHOLOGY | Facility: CLINIC | Age: 68
End: 2023-10-19
Payer: COMMERCIAL

## 2023-10-19 DIAGNOSIS — F41.9 ANXIETY DISORDER, UNSPECIFIED TYPE: ICD-10-CM

## 2023-10-19 DIAGNOSIS — F91.8 CONDUCT DISORDER, UNDIFFERENTIATED TYPE: Primary | ICD-10-CM

## 2023-10-19 PROCEDURE — 90853 GROUP PSYCHOTHERAPY: CPT | Mod: VID | Performed by: STUDENT IN AN ORGANIZED HEALTH CARE EDUCATION/TRAINING PROGRAM

## 2023-10-19 PROCEDURE — 90834 PSYTX W PT 45 MINUTES: CPT | Mod: 59 | Performed by: STUDENT IN AN ORGANIZED HEALTH CARE EDUCATION/TRAINING PROGRAM

## 2023-10-19 NOTE — GROUP NOTE
[facility]  [address], [address]  [place]  [address]    Group Progress Note  [facility] - Progress Note    Date of Service: 10/19/23   Name: Ramin Murcia  : 1955  Medical Record Number: [id number]  START TIME:  4:00 PM  END TIME:  6:00 PM  FACILITATOR(S): Mason Adkins, PhD; Dior Baltazar  TOPIC: [facility] Group Therapy  Type of Session: Group  :  Number of Attendees:  6  Number of Minutes:  120    SERVICE MODALITY:  Video Visit:      Provider verified identity through the following two step process.  Patient provided:  Patient is known previously to provider    Telemedicine Visit: The patient's condition can be safely assessed and treated via synchronous audio and visual telemedicine encounter.      Reason for Telemedicine Visit: Services only offered telehealth    Originating Site (Patient Location): Patient's home    Distant Site (Provider Location): [facility] Setting: Sexual and Gender Health Clinic    Consent:  The patient/guardian has verbally consented to: the potential risks and benefits of telemedicine (video visit) versus in person care; bill my insurance or make self-payment for services provided; and responsibility for payment of non-covered services.     Patient would like the video invitation sent by:  Other e-mail: See chart    Mode of Communication:  Video Conference via Medical Zoom    Distant Location (Provider):  On-site    As the provider I attest to compliance with applicable laws and regulations related to telemedicine.      DSM-5 Diagnoses:  Encounter Diagnoses   Name Primary?     Unspecified disruptive, impulse control, and conduct disorder (hypersexuality) Yes     Anxiety disorder, unspecified type          Current Reported Symptoms and Status update:  Changes since last session include no boundary violations but some urges, stopped alcohol use, some anxiety/worry.    Patient's problems with out of control,  driven, impulsive, compulsive sexual behavior began in early 2020. He has been in a stable loving marriage for 40 years then began having covert hook-ups with young men.  Efforts to change problematic behaviors have included several years of psychotherapy and attenance in [facility]. He continues to struggle with recurrent and intense urges which cause him distress.     Client reports experiencing some anxiety/worry and associated distress. He attributes distress to ineffective coping, patterns of avoidance/numbing, and difficulties with functioning in relationships.      Progress Toward Treatment Goals:   Satisfactory progress     Therapeutic Interventions/Treatment Strategies:    Area(s) of treatment focus addressed in this session included Symptom Management, Interpersonal Relationship Skills and Sexual Health and Wellness    Group members checked-in with each other about sexual health, substance use, mental health, coping, and social support. The group also shared updates on homework progress and relationship work. Client provided supportive feedback to others and challenged each other to continue progress towards treatment goals. Client processed the anniversary of his CSB discovery. He also reflected on his treatment progress and next steps in his work.  Psychotherapist offered support, feedback and validation and reinforced use of skills Treatment modalities used include [facility] THERAPY INTERVENTIONS: Cognitive Behavioral Therapy Interpersonal  Support and Feedback    Patient Response:   Patient responded to session by accepting feedback, giving feedback, listening, focusing on goals and accepting support  Possible barriers to participation / learning include: N/A    Current Mental Status Exam:   Appearance:  Appropriate   Eye Contact:  Good   Attitude / Demeanor: Cooperative   Speech      Rate / Production: Normal/ Responsive      Volume:  Normal  volume  Orientation:  All  Mood:   Normal  Affect:   Appropriate    Thought Content: Clear   Insight:   Good       Plan/Need for Future Services:  Return for therapy in 1 week to treat diagnosed problems.    Patient has a current master individualized treatment plan.  See Epic treatment plan for more information.    Referral / Collaboration:  Referral to another professional/service is not indicated at this time..  Emergency Services Needed?  No    Assignment:  Cycles    Interactive Complexity:  There are four specific communication difficulties that complicate the work of the primary psychiatric procedure.  Interactive complexity (+44527) may be reported when at least one of these difficulties is present.    Communication difficulties present during current the psychiatric procedure include:  1. None.      Signature/Title:      Mason Adkins, PhD, LP    [place] for Sexual [facility], Sexual and Gender Health Clinic  Department of Family Medicine and Community Health  [employer]

## 2023-10-19 NOTE — PROGRESS NOTES
[place] for Sexual [facility] Gender [facility] - Progress Note    Date of Service: 10/19/23   Name: Ramin Murcia  : 1955  Medical Record Number: [id number]  Treating Provider: Mason Adkins, PhD  Type of Session: Individual  Present in Session: Client and therapist  Session Start and Stop Time: 10:02am-10:48am  Number of Minutes:  46    SERVICE MODALITY:  In-person    DSM-5 Diagnoses:  Encounter Diagnoses   Name Primary?     Unspecified disruptive, impulse control, and conduct disorder (hypersexuality) Yes     Anxiety disorder, unspecified type          Current Reported Symptoms and Status update:  Changes since last session include no boundary violations, recently stopped Prozac and Naltrexone, anxiety/worry currently well-managed with therapy.     Patient's problems with out of control, driven, impulsive, compulsive sexual behavior began in early . He has been in a stable loving marriage for 40 years then began having covert hook-ups with young men.  Efforts to change problematic behaviors have included several years of psychotherapy and attenance in [facility]. He continues to struggle with recurrent and intense urges which cause him distress.     Client reports experiencing some anxiety/worry and associated distress. He attributes distress to ineffective coping, patterns of avoidance/numbing, and difficulties with functioning in relationships.      Progress Toward Treatment Goals:   Satisfactory progress     Therapeutic Interventions/Treatment Strategies:    Area(s) of treatment focus addressed in this session included Symptom Management, Interpersonal Relationship Skills and Sexual Health and Wellness    Client shared that he stopped his medications and then consulted with his psychiatry provider, Quan Richards. We discussed importance of client changing/stopping/starting medications under medical supervision. Client noted no increased anxiety or CSB. He also stopped alcohol use and plans to not drink the rest of  the year. We discussed client's treatment and next steps, including finishing his cycles. We discussed a recent comment client made in group about psychology intern. Client and therapist also reviewed client's risk factors to add to his cycles. Client plans to continue working on his cycles.     Psychotherapist offered support, feedback and validation and reinforced use of skills Treatment modalities used include Cognitive Behavioral Therapy Interpersonal  Support and Feedback    Patient Response:   Patient responded to session by accepting feedback, giving feedback, listening, focusing on goals and accepting support  Possible barriers to participation / learning include: N/A    Current Mental Status Exam:   Appearance:  Appropriate   Eye Contact:  Good   Attitude / Demeanor: Cooperative   Speech      Rate / Production: Normal/ Responsive      Volume:  Normal  volume  Orientation:  All  Mood:   Normal  Affect:   Appropriate   Thought Content: Clear   Insight:   Good       Plan/Need for Future Services:  Return for therapy in 2 weeks to treat diagnosed problems.    Patient has a current master individualized treatment plan.  See Epic treatment plan for more information.    Referral / Collaboration:  Referral to another professional/service is not indicated at this time..  Emergency Services Needed?  No    Assignment:  Cycles    Interactive Complexity:  There are four specific communication difficulties that complicate the work of the primary psychiatric procedure.  Interactive complexity (+53297) may be reported when at least one of these difficulties is present.    Communication difficulties present during current the psychiatric procedure include:  1. None.      Signature/Title:    Mason Adkins, PhD, LP    [place] for Sexual and Gender [facility], Sexual and Gender Health Clinic  Department of Family Medicine and Community Health  [employer]

## 2023-11-14 ENCOUNTER — OFFICE VISIT (OUTPATIENT)
Dept: PSYCHOLOGY | Facility: CLINIC | Age: 68
End: 2023-11-14
Payer: COMMERCIAL

## 2023-11-14 DIAGNOSIS — F91.8 CONDUCT DISORDER, UNDIFFERENTIATED TYPE: Primary | ICD-10-CM

## 2023-11-14 DIAGNOSIS — F41.9 ANXIETY DISORDER, UNSPECIFIED TYPE: ICD-10-CM

## 2023-11-14 PROCEDURE — 90834 PSYTX W PT 45 MINUTES: CPT | Performed by: STUDENT IN AN ORGANIZED HEALTH CARE EDUCATION/TRAINING PROGRAM

## 2023-11-14 NOTE — PROGRESS NOTES
[place] for Sexual and Gender [facility] - Progress Note    Date of Service: 23   Name: Ramin Murcia  : 1955  Medical Record Number: [id number]  Treating Provider: Mason Adknis, PhD  Type of Session: Individual  Present in Session: Client and therapist  Session Start and Stop Time: 11:00am-11:50am  Number of Minutes:  50    SERVICE MODALITY:  In-person    DSM-5 Diagnoses:  Encounter Diagnoses   Name Primary?     Unspecified disruptive, impulse control, and conduct disorder (hypersexuality) Yes     Anxiety disorder, unspecified type          Current Reported Symptoms and Status update:  Changes since last session include no boundary violations, working on understanding his negative cycle, sharing his cycle with his wife, reduced alcohol use, anxiety reasonably controlled with treatment.    Patient's problems with out of control, driven, impulsive, compulsive sexual behavior began in early . He has been in a stable loving marriage for 40 years then began having covert hook-ups with young men.  Efforts to change problematic behaviors have included several years of psychotherapy and attenance in [facility]. He continues to struggle with recurrent and intense urges which cause him distress.     Client reports experiencing some anxiety/worry and associated distress. He attributes distress to ineffective coping, patterns of avoidance/numbing, and difficulties with functioning in relationships.      Progress Toward Treatment Goals:   Satisfactory progress     Therapeutic Interventions/Treatment Strategies:    Area(s) of treatment focus addressed in this session included Symptom Management, Interpersonal Relationship Skills and Sexual Health and Wellness    Client and therapist reviewed client's treatment progress and next steps in treatment. He identified pivoting towards thinking about sexual health and pleasure, as well as relationship/intimacy repair, rather than just on controlling out of control behaviors.  Client shared his negative cycle conceptualization with therapist. We explored potential core beliefs client could add to the cycle. Encouraged client to present progress to group. Client plans to have his wife join next session for a collateral session.    Psychotherapist offered support, feedback and validation and reinforced use of skills Treatment modalities used include Cognitive Behavioral Therapy  Support, Redirection and Feedback    Patient Response:   Patient responded to session by accepting feedback, giving feedback, listening, focusing on goals and accepting support  Possible barriers to participation / learning include: N/A    Current Mental Status Exam:   Appearance:  Appropriate   Eye Contact:  Good   Attitude / Demeanor: Cooperative   Speech      Rate / Production: Normal/ Responsive      Volume:  Normal  volume  Orientation:  All  Mood:   Normal  Affect:   Appropriate   Thought Content: Clear   Insight:   Good       Plan/Need for Future Services:  Return for therapy in 2 weeks to treat diagnosed problems.  Weekly therapy group.  Patient has a current master individualized treatment plan.  See Epic treatment plan for more information.    Referral / Collaboration:  Referral to another professional/service is not indicated at this time..  Emergency Services Needed?  No    Assignment:  Continue working on cycles    Interactive Complexity:  There are four specific communication difficulties that complicate the work of the primary psychiatric procedure.  Interactive complexity (+12723) may be reported when at least one of these difficulties is present.    Communication difficulties present during current the psychiatric procedure include:  1. None.      Signature/Title:      Mason Adkins, PhD, LP    [place] for Sexual and Gender Health, Sexual and Gender Health Clinic  Department of Family Medicine and Community Health  [employer]

## 2023-11-16 ENCOUNTER — VIRTUAL VISIT (OUTPATIENT)
Dept: PSYCHOLOGY | Facility: CLINIC | Age: 68
End: 2023-11-16
Payer: COMMERCIAL

## 2023-11-16 DIAGNOSIS — F41.9 ANXIETY DISORDER, UNSPECIFIED TYPE: ICD-10-CM

## 2023-11-16 DIAGNOSIS — F91.8 CONDUCT DISORDER, UNDIFFERENTIATED TYPE: Primary | ICD-10-CM

## 2023-11-16 PROCEDURE — 90853 GROUP PSYCHOTHERAPY: CPT | Mod: VID | Performed by: STUDENT IN AN ORGANIZED HEALTH CARE EDUCATION/TRAINING PROGRAM

## 2023-11-16 NOTE — GROUP NOTE
[facility]  [address], [address]  [place]  [address]    Group Progress Note  [facility] - Progress Note    Date of Service: 23   Name: Ramin Murcia  : 1955  Medical Record Number: [id number]  START TIME:  4:00 PM  END TIME:  6:00 PM  FACILITATOR(S): Mason Adkins, PhD; Dior Baltazar  TOPIC: [facility] Group Therapy  Type of Session: Group  :  Number of Attendees:  5  Number of Minutes:  120    SERVICE MODALITY:  Video Visit:      Provider verified identity through the following two step process.  Patient provided:  Patient is known previously to provider    Telemedicine Visit: The patient's condition can be safely assessed and treated via synchronous audio and visual telemedicine encounter.      Reason for Telemedicine Visit: Services only offered telehealth    Originating Site (Patient Location): Patient's home    Distant Site (Provider Location): [facility]: Sexual and Gender Health Clinic    Consent:  The patient/guardian has verbally consented to: the potential risks and benefits of telemedicine (video visit) versus in person care; bill my insurance or make self-payment for services provided; and responsibility for payment of non-covered services.     Patient would like the video invitation sent by:  Other e-mail: see chart    Mode of Communication:  Video Conference via Medical Zoom    Distant Location (Provider):  On-site    As the provider I attest to compliance with applicable laws and regulations related to telemedicine.      DSM-5 Diagnoses:  Encounter Diagnoses   Name Primary?     Unspecified disruptive, impulse control, and conduct disorder (hypersexuality) Yes     Anxiety disorder, unspecified type          Current Reported Symptoms and Status update:  Changes since last session include no boundary violations, reduced alcohol use, anxiety reasonably well managed with treatment.    Patient's problems with out of control,  driven, impulsive, compulsive sexual behavior began in early 2020. He has been in a stable loving marriage for 40 years then began having covert hook-ups with young men.  Efforts to change problematic behaviors have included several years of psychotherapy and attenance in [facility]. He continues to struggle with recurrent and intense urges which cause him distress.     Client reports experiencing some anxiety/worry and associated distress. He attributes distress to ineffective coping, patterns of avoidance/numbing, and difficulties with functioning in relationships.      Progress Toward Treatment Goals:   Satisfactory progress     Therapeutic Interventions/Treatment Strategies:    Area(s) of treatment focus addressed in this session included Symptom Management, Interpersonal Relationship Skills and Sexual Health and Wellness    CBT and Interpersonal interventions were implemented in group therapy in support of group members  treatment. Members provided updates regarding sexual health, relationships, coping, mental health, and substance use. Members then provided updates on progress toward treatment goals and homework completion. Members provided each other supportive feedback. Client shared progress he is making on conceptualizing his negative and positive cycles. He also shared how antecedents connect to negative core fuels.  Psychotherapist offered support, feedback and validation and reinforced use of skills Treatment modalities used include [facility] THERAPY INTERVENTIONS: Cognitive Behavioral Therapy Interpersonal  Support and Feedback    Patient Response:   Patient responded to session by accepting feedback, giving feedback, listening, focusing on goals and accepting support  Possible barriers to participation / learning include: N/A    Current Mental Status Exam:   Appearance:  Appropriate   Eye Contact:  Good   Attitude / Demeanor: Cooperative   Speech      Rate / Production: Normal/ Responsive      Volume:  Normal   volume  Orientation:  All  Mood:   Normal  Affect:   Appropriate   Thought Content: Clear   Insight:   Good       Plan/Need for Future Services:  Return for therapy in 1 week to treat diagnosed problems.    Patient has a current master individualized treatment plan.  See Epic treatment plan for more information.    Referral / Collaboration:  Referral to another professional/service is not indicated at this time..  Emergency Services Needed?  No    Assignment:  Continue work on cycles    Interactive Complexity:  There are four specific communication difficulties that complicate the work of the primary psychiatric procedure.  Interactive complexity (+34666) may be reported when at least one of these difficulties is present.    Communication difficulties present during current the psychiatric procedure include:  1. None.      Signature/Title:    Mason Adkins, PhD, LP    [place] [facility], Sexual and Gender Health Clinic  Department of Family Medicine and Community Health  [employer] [facility]

## 2023-11-27 ENCOUNTER — OFFICE VISIT (OUTPATIENT)
Dept: PSYCHOLOGY | Facility: CLINIC | Age: 68
End: 2023-11-27
Payer: COMMERCIAL

## 2023-11-27 DIAGNOSIS — F41.9 ANXIETY DISORDER, UNSPECIFIED TYPE: ICD-10-CM

## 2023-11-27 DIAGNOSIS — F91.8 CONDUCT DISORDER, UNDIFFERENTIATED TYPE: Primary | ICD-10-CM

## 2023-11-27 PROCEDURE — 90834 PSYTX W PT 45 MINUTES: CPT | Performed by: STUDENT IN AN ORGANIZED HEALTH CARE EDUCATION/TRAINING PROGRAM

## 2023-11-27 NOTE — PROGRESS NOTES
[place] for Sexual and Gender [facility] - Progress Note    Date of Service: 23   Name: Ramin Murcia  : 1955  Medical Record Number: [id number]  Treating Provider: Mason Adkins, PhD  Type of Session: Individual with support person present  Present in Session: Client, client's wife Elizabeth, and therapist  Session Start and Stop Time: 2:00pm-2:50pm  Number of Minutes:  50    SERVICE MODALITY:  In-person    DSM-5 Diagnoses:  Encounter Diagnoses   Name Primary?     Unspecified disruptive, impulse control, and conduct disorder (hypersexuality) Yes     Anxiety disorder, unspecified type          Current Reported Symptoms and Status update:  Changes since last session include no boundary violations, wanting to work on intimacy and relationships, recent increased alcohol use, anxiety reasonably controlled with treatment.    Patient's problems with out of control, driven, impulsive, compulsive sexual behavior began in early . He has been in a stable loving marriage for 40 years then began having covert hook-ups with young men.  Efforts to change problematic behaviors have included several years of psychotherapy and attenance in [facility]. He continues to struggle with recurrent and intense urges which cause him distress.     Client reports experiencing some anxiety/worry and associated distress. He attributes distress to ineffective coping, patterns of avoidance/numbing, and difficulties with functioning in relationships.      Progress Toward Treatment Goals:   Satisfactory progress     Therapeutic Interventions/Treatment Strategies:    Area(s) of treatment focus addressed in this session included Symptom Management, Interpersonal Relationship Skills and Sexual Health and Wellness    Today's collateral session was to review treatment progress and presenting concerns with client's wife, Elizabeth. Client and Elizabeth addressed concerns re: client's negative cycles and alcohol use. Encouraged client to identify his  goal with alcohol use (e.g., moderation vs. Abstinence). Elizabeth reported concerns that client is focused on sex vs intimacy development. We reviewed treatment progress and next steps. They plan to discontinue couples work for the time being. Client and Elizabeth agreed to more regular collateral sessions or relationship work with this therapist.    Psychotherapist offered support, feedback and validation, provided redirection and reinforced use of skills Treatment modalities used include Cognitive Behavioral Therapy Interpersonal  Support and Feedback    Patient Response:   Patient responded to session by accepting feedback, giving feedback, listening, focusing on goals and accepting support  Possible barriers to participation / learning include: N/A    Current Mental Status Exam:   Appearance:  Appropriate   Eye Contact:  Good   Attitude / Demeanor: Cooperative   Speech      Rate / Production: Normal/ Responsive      Volume:  Normal  volume  Orientation:  All  Mood:   Normal  Affect:   Appropriate   Thought Content: Clear   Insight:   Good       Plan/Need for Future Services:  Return for therapy in 2 weeks to treat diagnosed problems.    Patient has a current master individualized treatment plan.  See Epic treatment plan for more information.    Referral / Collaboration:  Referral to another professional/service is not indicated at this time..  Emergency Services Needed?  No    Assignment:  Identify goals with alcohol use    Interactive Complexity:  There are four specific communication difficulties that complicate the work of the primary psychiatric procedure.  Interactive complexity (+25429) may be reported when at least one of these difficulties is present.    Communication difficulties present during current the psychiatric procedure include:  1. None.      Signature/Title:    Mason Adkins, PhD, LP    [place] for Sexual and Gender [facility], Sexual and Gender Health Clinic  Department of  Family Medicine and Community Health  [facility]

## 2023-11-30 ENCOUNTER — VIRTUAL VISIT (OUTPATIENT)
Dept: PSYCHOLOGY | Facility: CLINIC | Age: 68
End: 2023-11-30
Payer: COMMERCIAL

## 2023-11-30 DIAGNOSIS — F91.8 CONDUCT DISORDER, UNDIFFERENTIATED TYPE: Primary | ICD-10-CM

## 2023-11-30 DIAGNOSIS — F41.9 ANXIETY DISORDER, UNSPECIFIED TYPE: ICD-10-CM

## 2023-11-30 PROCEDURE — 90853 GROUP PSYCHOTHERAPY: CPT | Mod: VID | Performed by: STUDENT IN AN ORGANIZED HEALTH CARE EDUCATION/TRAINING PROGRAM

## 2023-11-30 NOTE — GROUP NOTE
[facility]  [address], [address]  [place]  [address]    Group Progress Note  [facility] - Progress Note    Date of Service: 23   Name: Ramin Murcia  : 1955  Medical Record Number: [id number]  START TIME:  4:00 PM  END TIME:  6:00 PM  FACILITATOR(S): Mason Adkins, PhD; Dior Baltazar  TOPIC: [facility] Group Therapy  Type of Session: Group  :  Number of Attendees:  7  Number of Minutes:  120    SERVICE MODALITY:  Video Visit:      Provider verified identity through the following two step process.  Patient provided:  Patient is known previously to provider and Patient was verified at admission/transfer    Telemedicine Visit: The patient's condition can be safely assessed and treated via synchronous audio and visual telemedicine encounter.      Reason for Telemedicine Visit: Services only offered telehealth    Originating Site (Patient Location): Patient's home    Distant Site (Provider Location): [facility]: Sexual and Gender Health Clinic    Consent:  The patient/guardian has verbally consented to: the potential risks and benefits of telemedicine (video visit) versus in person care; bill my insurance or make self-payment for services provided; and responsibility for payment of non-covered services.     Patient would like the video invitation sent by:  Other e-mail: see chart    Mode of Communication:  Video Conference via Medical Zoom    Distant Location (Provider):  On-site    As the provider I attest to compliance with applicable laws and regulations related to telemedicine.      DSM-5 Diagnoses:  Encounter Diagnoses   Name Primary?     Unspecified disruptive, impulse control, and conduct disorder (hypersexuality) Yes     Anxiety disorder, unspecified type          Current Reported Symptoms and Status update:  Changes since last session include risk situations, increased urges, no boundary violations, some  anxiety/worry.    Patient's problems with out of control, driven, impulsive, compulsive sexual behavior began in early 2020. He has been in a stable loving marriage for 40 years then began having covert hook-ups with young men.  Efforts to change problematic behaviors have included several years of psychotherapy and attenance in [facility]. He continues to struggle with recurrent and intense urges which cause him distress.     Client reports experiencing some anxiety/worry and associated distress. He attributes distress to ineffective coping, patterns of avoidance/numbing, and difficulties with functioning in relationships.      Progress Toward Treatment Goals:   Satisfactory progress     Therapeutic Interventions/Treatment Strategies:    Area(s) of treatment focus addressed in this session included Symptom Management, Interpersonal Relationship Skills and Sexual Health and Wellness    Group members provided updates regarding coping, mental health, substance use, and sexual health. Members shared successes and challenges with staying within boundaries and completing group work. Members supported each other and provided each other feedback to enhance treatment progress. Client processed recent conflict with his wife regarding CSB, sexual connection, and alcohol use. He also reflected on his overall progress on core boundary issues, but feeling more work to do on underlying factors.   Psychotherapist offered support, feedback and validation and reinforced use of skills Treatment modalities used include [facility] THERAPY INTERVENTIONS: Cognitive Behavioral Therapy Interpersonal  Support and Feedback    Patient Response:   Patient responded to session by accepting feedback, giving feedback, listening, focusing on goals and accepting support  Possible barriers to participation / learning include: N/A    Current Mental Status Exam:   Appearance:  Appropriate   Eye Contact:  Good   Attitude / Demeanor: Cooperative   Speech      Rate /  Production: Normal/ Responsive      Volume:  Normal  volume  Orientation:  All  Mood:   Normal  Affect:   Appropriate   Thought Content: Clear   Insight:   Good       Plan/Need for Future Services:  Return for therapy in 1 week to treat diagnosed problems.    Patient has a current master individualized treatment plan.  See Epic treatment plan for more information.    Referral / Collaboration:  Referral to another professional/service is not indicated at this time..  Emergency Services Needed?  No    Assignment:  Continue working on coping    Interactive Complexity:  There are four specific communication difficulties that complicate the work of the primary psychiatric procedure.  Interactive complexity (+53062) may be reported when at least one of these difficulties is present.    Communication difficulties present during current the psychiatric procedure include:  1. None.      Signature/Title:      Mason Adkins, PhD, LP    [place] for Sexual and Gender Health, Sexual and Gender Health Clinic  Department of Family Medicine and Community Health  [employer]

## 2023-12-04 ENCOUNTER — OFFICE VISIT (OUTPATIENT)
Dept: PSYCHOLOGY | Facility: CLINIC | Age: 68
End: 2023-12-04
Payer: COMMERCIAL

## 2023-12-04 DIAGNOSIS — F41.9 ANXIETY DISORDER, UNSPECIFIED TYPE: ICD-10-CM

## 2023-12-04 DIAGNOSIS — F91.8 CONDUCT DISORDER, UNDIFFERENTIATED TYPE: Primary | ICD-10-CM

## 2023-12-04 PROCEDURE — 90847 FAMILY PSYTX W/PT 50 MIN: CPT | Performed by: STUDENT IN AN ORGANIZED HEALTH CARE EDUCATION/TRAINING PROGRAM

## 2023-12-04 NOTE — PROGRESS NOTES
[place] for Sexual and Gender Health - Progress Note    Date of Service: 23   Name: Ramin Murcia  : 1955  Medical Record Number: [id number]  Treating Provider: Mason Adkins, PhD  Type of Session: Couple  Present in Session: Elizabeth Faustin, therapist  Session Start and Stop Time: 3:00pm-4:30pm  Number of Minutes:  90    SERVICE MODALITY:  In-person    DSM-5 Diagnoses:  Encounter Diagnoses   Name Primary?     Unspecified disruptive, impulse control, and conduct disorder (hypersexuality) Yes     Anxiety disorder, unspecified type          Current Reported Symptoms and Status update:  Changes since last session include no boundary violations, working on cycles and sexual health, working on communication, some anxiety and worry.    Patient's problems with out of control, driven, impulsive, compulsive sexual behavior began in early . He has been in a stable loving marriage for 40 years then began having covert hook-ups with young men.  Efforts to change problematic behaviors have included several years of psychotherapy and attenance in [facility]. He continues to struggle with recurrent and intense urges which cause him distress.     Client reports experiencing some anxiety/worry and associated distress. He attributes distress to ineffective coping, patterns of avoidance/numbing, and difficulties with functioning in relationships.      Progress Toward Treatment Goals:   Satisfactory progress     Therapeutic Interventions/Treatment Strategies:    Area(s) of treatment focus addressed in this session included Symptom Management, Interpersonal Relationship Skills and Sexual Health and Wellness    Ramin and Elizabeth discussed progress in their own individual work. Ramin reported work on his cycles and wanting to do the sexual health inventory. Elizabeth is working on her boundaries and identifying sources of intimacy. Both reflected on Elizabeth feeling like her intimacy needs are not being met. They discussed  "challenges in their connection and ways in which Ramin feels Elizabeth controls him and Elizabeth feels Ramin is entitled. Elizabeth identified reactions to Ramin's CSB history and ongoing pain she feels related to it. She described being confused/uncertain about her needing to have boundaries and if she controls Ramin. Therapist supported the couple to reframe \"control\" to \"observe\", \"police\", and \"protect\". Elizabeth described agreeing with this. We planned for Ramin and Elizabeth to each individually meet with this provider. Ramin will work on cycles and meet 1:1 to finish them. Elizabeth will return 1:1 to discuss reactions/feelings and ways in which she is self-protective in the relationship. We will then discuss next steps in treatment.      Psychotherapist offered support, feedback and validation and reinforced use of skills Treatment modalities used include Cognitive Behavioral Therapy Interpersonal  Support and Feedback    Patient Response:   Patient responded to session by accepting feedback, giving feedback, listening, focusing on goals and accepting support  Possible barriers to participation / learning include: N/A    Current Mental Status Exam:   Appearance:  Appropriate   Eye Contact:  Good   Attitude / Demeanor: Cooperative   Speech      Rate / Production: Normal/ Responsive      Volume:  Normal  volume  Orientation:  All  Mood:   Normal  Affect:   Appropriate   Thought Content: Clear   Insight:   Good       Plan/Need for Future Services:  Return for therapy in 2 weeks to treat diagnosed problems.    Patient has a current master individualized treatment plan.  See Epic treatment plan for more information.    Referral / Collaboration:  Referral to another professional/service is not indicated at this time..  Emergency Services Needed?  No    Assignment:  Boundaries  Cycles    Interactive Complexity:  There are four specific communication difficulties that complicate the work of the primary psychiatric procedure.  " Interactive complexity (+52511) may be reported when at least one of these difficulties is present.    Communication difficulties present during current the psychiatric procedure include:  1. None.      Signature/Title:    Mason Adkins, PhD, LP    [place] for Sexual and Gender Health, Sexual and Gender Health Clinic  Department of Family Medicine and Community Health  [facility]

## 2023-12-14 ENCOUNTER — VIRTUAL VISIT (OUTPATIENT)
Dept: PSYCHOLOGY | Facility: CLINIC | Age: 68
End: 2023-12-14
Payer: COMMERCIAL

## 2023-12-14 DIAGNOSIS — F41.9 ANXIETY DISORDER, UNSPECIFIED TYPE: ICD-10-CM

## 2023-12-14 DIAGNOSIS — F91.8 CONDUCT DISORDER, UNDIFFERENTIATED TYPE: Primary | ICD-10-CM

## 2023-12-14 PROCEDURE — 90853 GROUP PSYCHOTHERAPY: CPT | Mod: VID | Performed by: STUDENT IN AN ORGANIZED HEALTH CARE EDUCATION/TRAINING PROGRAM

## 2023-12-14 NOTE — GROUP NOTE
Gender and Sexual Health Clinic  [address], [address]  [place]  [address]    Group Progress Note  Gender and [facility] Health Clinic - Progress Note    Date of Service: 23   Name: Ramin Murcia  : 1955  Medical Record Number: [id number]  START TIME:  4:00 PM  END TIME:  5:00 PM  FACILITATOR(S): Mason Adkins, PhD; Dior Baltazar  TOPIC: [facility] Group Therapy  Type of Session: Group  :  Number of Attendees:  7  Number of Minutes:  60 - client needed to leave early    SERVICE MODALITY:  Video Visit:      Provider verified identity through the following two step process.  Patient provided:  Patient is known previously to provider    Telemedicine Visit: The patient's condition can be safely assessed and treated via synchronous audio and visual telemedicine encounter.      Reason for Telemedicine Visit: Services only offered telehealth    Originating Site (Patient Location): Patient's home    Distant Site (Provider Location): [facility]: Sexual and Gender Health Clinic    Consent:  The patient/guardian has verbally consented to: the potential risks and benefits of telemedicine (video visit) versus in person care; bill my insurance or make self-payment for services provided; and responsibility for payment of non-covered services.     Patient would like the video invitation sent by:  Other e-mail: see chart    Mode of Communication:  Video Conference via Medical Zoom    Distant Location (Provider):  On-site    As the provider I attest to compliance with applicable laws and regulations related to telemedicine.      DSM-5 Diagnoses:  Encounter Diagnoses   Name Primary?     Unspecified disruptive, impulse control, and conduct disorder (hypersexuality) Yes     Anxiety disorder, unspecified type          Current Reported Symptoms and Status update:  Changes since last session include no boundary violations, strong urges, increased anxiety about relationship.    Patient's problems with  out of control, driven, impulsive, compulsive sexual behavior began in early 2020. He has been in a stable loving marriage for 40 years then began having covert hook-ups with young men.  Efforts to change problematic behaviors have included several years of psychotherapy and attenance in [facility]. He continues to struggle with recurrent and intense urges which cause him distress.     Client reports experiencing some anxiety/worry and associated distress. He attributes distress to ineffective coping, patterns of avoidance/numbing, and difficulties with functioning in relationships.      Progress Toward Treatment Goals:   Satisfactory progress     Therapeutic Interventions/Treatment Strategies:    Area(s) of treatment focus addressed in this session included Symptom Management, Interpersonal Relationship Skills and Sexual Health and Wellness    Group members provided updates regarding sexual health concerns, relationship functioning, mental health concerns, and coping. Members provided each other support and encouragement. Members shared progress on homework completion. Client processed effects of his CSB on his relationship, and ways in which he and his wife are trying to work on communication and accountability. He processed ways in which he and his wife differ in their understanding of his progress. He provided input and feedback to others.   Psychotherapist offered support, feedback and validation and reinforced use of skills Treatment modalities used include [facility] THERAPY INTERVENTIONS: Cognitive Behavioral Therapy Interpersonal  Support and Feedback    Patient Response:   Patient responded to session by accepting feedback, giving feedback, listening, focusing on goals and accepting support  Possible barriers to participation / learning include: N/A    Current Mental Status Exam:   Appearance:  Appropriate   Eye Contact:  Good   Attitude / Demeanor: Cooperative   Speech      Rate / Production: Normal/ Responsive       Volume:  Normal  volume  Orientation:  All  Mood:   Normal  Affect:   Appropriate   Thought Content: Clear   Insight:   Good       Plan/Need for Future Services:  Return for therapy in 1 week to treat diagnosed problems.    Patient has a current master individualized treatment plan.  See Epic treatment plan for more information.    Referral / Collaboration:  Referral to another professional/service is not indicated at this time..  Emergency Services Needed?  No    Assignment:  Continue working on cycles    Interactive Complexity:  There are four specific communication difficulties that complicate the work of the primary psychiatric procedure.  Interactive complexity (+43006) may be reported when at least one of these difficulties is present.    Communication difficulties present during current the psychiatric procedure include:  1. None.        Mason Adkins, PhD, LP    [place] for Sexual and Gender Health, Sexual and Gender Health Clinic  Department of Family Medicine and Community Health  [employer]

## 2023-12-21 ENCOUNTER — OFFICE VISIT (OUTPATIENT)
Dept: PSYCHOLOGY | Facility: CLINIC | Age: 68
End: 2023-12-21
Payer: COMMERCIAL

## 2023-12-21 ENCOUNTER — VIRTUAL VISIT (OUTPATIENT)
Dept: PSYCHOLOGY | Facility: CLINIC | Age: 68
End: 2023-12-21
Payer: COMMERCIAL

## 2023-12-21 DIAGNOSIS — F41.9 ANXIETY DISORDER, UNSPECIFIED TYPE: ICD-10-CM

## 2023-12-21 DIAGNOSIS — F91.8 CONDUCT DISORDER, UNDIFFERENTIATED TYPE: Primary | ICD-10-CM

## 2023-12-21 PROCEDURE — 90834 PSYTX W PT 45 MINUTES: CPT | Mod: 59 | Performed by: STUDENT IN AN ORGANIZED HEALTH CARE EDUCATION/TRAINING PROGRAM

## 2023-12-21 PROCEDURE — 90853 GROUP PSYCHOTHERAPY: CPT | Mod: VID | Performed by: STUDENT IN AN ORGANIZED HEALTH CARE EDUCATION/TRAINING PROGRAM

## 2023-12-21 NOTE — PROGRESS NOTES
[facility] - Progress Note    Date of Service: 23   Name: Ramin Murcia  : 1955  Medical Record Number: [id number]  Treating Provider: Mason Adkins, PhD  Type of Session: Individual  Present in Session:    Session Start and Stop Time: 10:00am-10:50am  Number of Minutes:  50     SERVICE MODALITY:  In-person    DSM-5 Diagnoses:  Encounter Diagnoses   Name Primary?     Unspecified disruptive, impulse control, and conduct disorder (hypersexuality) Yes     Anxiety disorder, unspecified type        Current Reported Symptoms and Status update:  Changes since last session include no boundary violations, working to reduce alcohol use, working on communication with his wife, improvement in anxiety, concerns about risk situations.      Patient's problems with out of control, driven, impulsive, compulsive sexual behavior began in early . He has been in a stable loving marriage for 40 years then began having covert hook-ups with young men.  Efforts to change problematic behaviors have included several years of psychotherapy and attenance in [facility]. He continues to struggle with recurrent and intense urges which cause him distress.     Client reports experiencing some anxiety/worry and associated distress. He attributes distress to ineffective coping, patterns of avoidance/numbing, and difficulties with functioning in relationships.    Progress Toward Treatment Goals:   Satisfactory progress     Therapeutic Interventions/Treatment Strategies:    Area(s) of treatment focus addressed in this session included Symptom Management, Interpersonal Relationship Skills and Sexual Health and Wellness    Client processed feelings of frustration about sexual concerns, his relationship, and wanting to be further along in treatment. We discussed the therapeutic relationship. Client and therapist explored client's self-described need for control. We also examined core beliefs related to antecedents and  his negative cycle. Assigned client to continue working on cycles. Shared the sexual self inventory with client.    Psychotherapist offered support, feedback and validation and reinforced use of skills Treatment modalities used include Cognitive Behavioral Therapy Interpersonal  Support and Feedback    Patient Response:   Patient responded to session by accepting feedback, giving feedback, listening, focusing on goals and accepting support  Possible barriers to participation / learning include: N/A    Current Mental Status Exam:   Appearance:  Appropriate   Eye Contact:  Good   Attitude / Demeanor: Cooperative   Speech      Rate / Production: Normal/ Responsive      Volume:  Normal  volume  Orientation:  All  Mood:   Normal  Affect:   Appropriate   Thought Content: Clear   Insight:   Good       Plan/Need for Future Services:  Return for therapy in 2 weeks to treat diagnosed problems.    Patient has a current master individualized treatment plan.  See Epic treatment plan for more information.    Referral / Collaboration:  Referral to another professional/service is not indicated at this time..  Emergency Services Needed?  No    Assignment:  Cycles  Sexual self inventory    Interactive Complexity:  There are four specific communication difficulties that complicate the work of the primary psychiatric procedure.  Interactive complexity (+30896) may be reported when at least one of these difficulties is present.    Communication difficulties present during current the psychiatric procedure include:  1. None.      Signature/Title:      Mason Adkins, PhD, LP    [facility], [facility]  Department of Family Medicine and Community Health  [employer] [facility] [employer]

## 2023-12-21 NOTE — GROUP NOTE
[facility] Sexual [facility]  [address], [address]  [place]  [address]    Group Progress Note  [facility] - Progress Note    Date of Service: 23   Name: Ramin Murcia  : 1955  Medical Record Number: [id number]  START TIME:  4:00 PM  END TIME:  5:20 PM  FACILITATOR(S): Mason Adkins, PhD  TOPIC: SGHC Group Therapy  Type of Session: Group  :  Number of Attendees:  5  Number of Minutes:  80    SERVICE MODALITY:  Video Visit:      Provider verified identity through the following two step process.  Patient provided:  Patient is known previously to provider    Telemedicine Visit: The patient's condition can be safely assessed and treated via synchronous audio and visual telemedicine encounter.      Reason for Telemedicine Visit: Services only offered telehealth    Originating Site (Patient Location): Patient's home    Distant Site (Provider Location): [facility]: Sexual and Gender Health Clinic    Consent:  The patient/guardian has verbally consented to: the potential risks and benefits of telemedicine (video visit) versus in person care; bill my insurance or make self-payment for services provided; and responsibility for payment of non-covered services.     Patient would like the video invitation sent by:  Other e-mail: see chart    Mode of Communication:  Video Conference via Medical Zoom    Distant Location (Provider):  On-site    As the provider I attest to compliance with applicable laws and regulations related to telemedicine.      DSM-5 Diagnoses:  Encounter Diagnoses   Name Primary?     Unspecified disruptive, impulse control, and conduct disorder (hypersexuality) Yes     Anxiety disorder, unspecified type          Current Reported Symptoms and Status update:  Changes since last session include no boundary violations, increased urges, some anxiety and worry.    Progress Toward Treatment Goals:   Satisfactory progress     Therapeutic  Interventions/Treatment Strategies:    Area(s) of treatment focus addressed in this session included Symptom Management, Interpersonal Relationship Skills and Sexual Health and Wellness    Group members shared updates regarding mental health, sexual health, relationships, and coping. Members also provided progress updates regarding therapy homework and group work. Members provided each other feedback and support. Client shared progress on his positive and negative cycles. He reflected on ways in which he continues to understand his core fuels. He is working on his relationship with his wife.   Psychotherapist offered support, feedback and validation and reinforced use of skills Treatment modalities used include [facility] THERAPY INTERVENTIONS: Cognitive Behavioral Therapy Interpersonal  Support and Feedback    Patient Response:   Patient responded to session by accepting feedback, giving feedback, listening, focusing on goals and accepting support  Possible barriers to participation / learning include: N/A    Current Mental Status Exam:   Appearance:  Appropriate   Eye Contact:  Good   Attitude / Demeanor: Cooperative   Speech      Rate / Production: Normal/ Responsive      Volume:  Normal  volume  Orientation:  All  Mood:   Normal  Affect:   Appropriate   Thought Content: Clear   Insight:   Good       Plan/Need for Future Services:  Return for therapy in 2 weeks to treat diagnosed problems.    Patient has a current master individualized treatment plan.  See Epic treatment plan for more information.    Referral / Collaboration:  Referral to another professional/service is not indicated at this time..  Emergency Services Needed?  No    Assignment:  Continue cycles  Sexual self inventory    Interactive Complexity:  There are four specific communication difficulties that complicate the work of the primary psychiatric procedure.  Interactive complexity (+46705) may be reported when at least one of these difficulties is  present.    Communication difficulties present during current the psychiatric procedure include:  1. None.      Signature/Title:    Mason Adkins, PhD, LP    [facility], Sexual and Gender Health Clinic  Department of Family Medicine and Community Health  [facility]

## 2024-01-04 ENCOUNTER — VIRTUAL VISIT (OUTPATIENT)
Dept: PSYCHOLOGY | Facility: CLINIC | Age: 69
End: 2024-01-04
Payer: COMMERCIAL

## 2024-01-04 DIAGNOSIS — F41.9 ANXIETY DISORDER, UNSPECIFIED TYPE: ICD-10-CM

## 2024-01-04 DIAGNOSIS — F91.8 CONDUCT DISORDER, UNDIFFERENTIATED TYPE: Primary | ICD-10-CM

## 2024-01-04 PROCEDURE — 90853 GROUP PSYCHOTHERAPY: CPT | Mod: 95 | Performed by: STUDENT IN AN ORGANIZED HEALTH CARE EDUCATION/TRAINING PROGRAM

## 2024-01-04 NOTE — GROUP NOTE
Gender and Sexual Health Clinic  [address], [address]  [place]  [address]    Group Progress Note  Gender and Sexual Health Clinic - Progress Note    Date of Service: 24   Name: Ramin Murcia  : 1955  Medical Record Number: [id number]  START TIME:  4:00 PM  END TIME:  6:00 PM  FACILITATOR(S): Mason Adkins, PhD; Dior Baltazar  TOPIC: SGHC Group Therapy  Type of Session: Group  :  Number of Attendees:  5  Number of Minutes:  120    SERVICE MODALITY:  Video Visit:      Provider verified identity through the following two step process.  Patient provided:  Patient is known previously to provider and Patient was verified at admission/transfer    Telemedicine Visit: The patient's condition can be safely assessed and treated via synchronous audio and visual telemedicine encounter.      Reason for Telemedicine Visit: Services only offered telehealth    Originating Site (Patient Location): Patient's home    Distant Site (Provider Location): Provider Remote Setting- Home Office    Consent:  The patient/guardian has verbally consented to: the potential risks and benefits of telemedicine (video visit) versus in person care; bill my insurance or make self-payment for services provided; and responsibility for payment of non-covered services.     Patient would like the video invitation sent by:  Other e-mail: see chart    Mode of Communication:  Video Conference via Medical Zoom    Distant Location (Provider):  Off-site    As the provider I attest to compliance with applicable laws and regulations related to telemedicine.      DSM-5 Diagnoses:  Encounter Diagnoses   Name Primary?     Unspecified disruptive, impulse control, and conduct disorder (hypersexuality) Yes     Anxiety disorder, unspecified type          Current Reported Symptoms and Status update:  Changes since last session include no boundary violations, some high risk situations, some challenges with alcohol use, anxiety reasonably  managed with treatment.    Patient's problems with out of control, driven, impulsive, compulsive sexual behavior began in early 2020. He has been in a stable loving marriage for 40 years then began having covert hook-ups with young men.  Efforts to change problematic behaviors have included several years of psychotherapy and attenance in [facility]. He continues to struggle with recurrent and intense urges which cause him distress.     Client reports experiencing some anxiety/worry and associated distress. He attributes distress to ineffective coping, patterns of avoidance/numbing, and difficulties with functioning in relationships.      Progress Toward Treatment Goals:   Satisfactory progress     Therapeutic Interventions/Treatment Strategies:    Area(s) of treatment focus addressed in this session included Symptom Management, Interpersonal Relationship Skills and Sexual Health and Wellness    Group members checked in regarding mental and sexual health; relationships; intimacy and sexuality; and coping. Members also provided updates on homework progress. Members provided each other support, encouragement, and feedback. Client discussed concerns his wife addressed about their relationship, and his feelings about how he has worked on CSB. He also reflected on work he wants to do on alcohol use. He noted that his tendency to push his wife along in working on their relationship, but recognizing they might be in different emotional/relational places.  Psychotherapist offered support, feedback and validation and reinforced use of skills Treatment modalities used include [facility] THERAPY INTERVENTIONS: Cognitive Behavioral Therapy Interpersonal  Support and Feedback    Patient Response:   Patient responded to session by accepting feedback, giving feedback, listening, focusing on goals and accepting support  Possible barriers to participation / learning include: N/A    Current Mental Status Exam:   Appearance:  Appropriate   Eye  Contact:  Good   Attitude / Demeanor: Cooperative   Speech      Rate / Production: Normal/ Responsive      Volume:  Normal  volume  Orientation:  All  Mood:   Normal  Affect:   Appropriate   Thought Content: Clear   Insight:   Good       Plan/Need for Future Services:  Return for therapy in 2 weeks to treat diagnosed problems.    Patient has a current master individualized treatment plan.  See Epic treatment plan for more information.    Referral / Collaboration:  Referral to another professional/service is not indicated at this time..  Emergency Services Needed?  No    Assignment:  Continue working on cycles, and considering how alcohol use factors in    Interactive Complexity:  There are four specific communication difficulties that complicate the work of the primary psychiatric procedure.  Interactive complexity (+64967) may be reported when at least one of these difficulties is present.    Communication difficulties present during current the psychiatric procedure include:  1. None.        Mason Akdins, PhD, LP    [facility] Sexual [facility], Sexual and Gender Health Clinic  Department of Family Medicine and Community Health  [employer] [facility] [employer]

## 2024-01-18 ENCOUNTER — VIRTUAL VISIT (OUTPATIENT)
Dept: PSYCHOLOGY | Facility: CLINIC | Age: 69
End: 2024-01-18
Payer: COMMERCIAL

## 2024-01-18 DIAGNOSIS — F91.8 CONDUCT DISORDER, UNDIFFERENTIATED TYPE: Primary | ICD-10-CM

## 2024-01-18 DIAGNOSIS — F41.9 ANXIETY DISORDER, UNSPECIFIED TYPE: ICD-10-CM

## 2024-01-18 PROCEDURE — 90853 GROUP PSYCHOTHERAPY: CPT | Mod: 95 | Performed by: STUDENT IN AN ORGANIZED HEALTH CARE EDUCATION/TRAINING PROGRAM

## 2024-01-18 NOTE — GROUP NOTE
Gender and Sexual Health Clinic  [address], [address]  [place]  [address]    Group Progress Note  Gender and Sexual Health Clinic - Progress Note    Date of Service: 24   Name: Ramin Murcia  : 1955  Medical Record Number: [id number]  START TIME:  4:00 PM  END TIME:  6:00 PM  FACILITATOR(S): Mason Adkins, PhD; Dior Baltazar  TOPIC: SGHC Group Therapy  Type of Session: Group  :  Number of Attendees:  8  Number of Minutes:  120    SERVICE MODALITY:  Video Visit:      Provider verified identity through the following two step process.  Patient provided:  Patient is known previously to provider and Patient was verified at admission/transfer    Telemedicine Visit: The patient's condition can be safely assessed and treated via synchronous audio and visual telemedicine encounter.      Reason for Telemedicine Visit: Services only offered telehealth    Originating Site (Patient Location): Patient's home    Distant Site (Provider Location): Provider Remote Setting- Home Office    Consent:  The patient/guardian has verbally consented to: the potential risks and benefits of telemedicine (video visit) versus in person care; bill my insurance or make self-payment for services provided; and responsibility for payment of non-covered services.     Patient would like the video invitation sent by:  Other e-mail: see chart    Mode of Communication:  Video Conference via Medical Zoom    Distant Location (Provider):  Off-site    As the provider I attest to compliance with applicable laws and regulations related to telemedicine.      DSM-5 Diagnoses:  Encounter Diagnoses   Name Primary?     Unspecified disruptive, impulse control, and conduct disorder (hypersexuality) Yes     Anxiety disorder, unspecified type          Current Reported Symptoms and Status update:  Changes since last session include no boundary violations, some sexual urges, working on his relationship with his wife, anxiety and  worry.    Patient's problems with out of control, driven, impulsive, compulsive sexual behavior began in early 2020. He has been in a stable loving marriage for 40 years then began having covert hook-ups with young men.  Efforts to change problematic behaviors have included several years of psychotherapy and attenance in [facility]. He continues to struggle with recurrent and intense urges which cause him distress.     Client reports experiencing some anxiety/worry and associated distress. He attributes distress to ineffective coping, patterns of avoidance/numbing, and difficulties with functioning in relationships.      Progress Toward Treatment Goals:   Satisfactory progress     Therapeutic Interventions/Treatment Strategies:    Area(s) of treatment focus addressed in this session included Symptom Management, Interpersonal Relationship Skills and Sexual Health and Wellness    Group members provided updates about mental and sexual health, relationships, coping, and compulsive sexual behavior treatment. Members expressed support to each other and challenged each other in their therapy work. Members also responded to feedback provided to them. Client shared how he is working on acceptance of his sexuality and interests. He also reflected on ways in which he is holding himself accountable and aware of risk situations.     Psychotherapist offered support, feedback and validation and reinforced use of skills Treatment modalities used include [facility] THERAPY INTERVENTIONS: Cognitive Behavioral Therapy Interpersonal  Support and Feedback    Patient Response:   Patient responded to session by accepting feedback, giving feedback, listening, focusing on goals and accepting support  Possible barriers to participation / learning include: N/A    Current Mental Status Exam:   Appearance:  Appropriate   Eye Contact:  Good   Attitude / Demeanor: Cooperative   Speech      Rate / Production: Normal/ Responsive      Volume:  Normal   volume  Orientation:  All  Mood:   Normal  Affect:   Appropriate   Thought Content: Clear   Insight:   Good       Plan/Need for Future Services:  Return for therapy in 1 week to treat diagnosed problems.    Patient has a current master individualized treatment plan.  See Epic treatment plan for more information.    Referral / Collaboration:  Referral to another professional/service is not indicated at this time..  Emergency Services Needed?  No    Assignment:  Continue working on cycles    Interactive Complexity:  There are four specific communication difficulties that complicate the work of the primary psychiatric procedure.  Interactive complexity (+46827) may be reported when at least one of these difficulties is present.    Communication difficulties present during current the psychiatric procedure include:  1. None.        Mason Adkins, PhD, LP    [facility] Sexual [facility], Sexual and Gender Health Clinic  Department of Family Medicine and Community Health  [employer] [facility] [employer]

## 2024-01-23 ENCOUNTER — VIRTUAL VISIT (OUTPATIENT)
Dept: PSYCHOLOGY | Facility: CLINIC | Age: 69
End: 2024-01-23
Payer: COMMERCIAL

## 2024-01-23 DIAGNOSIS — F91.8 CONDUCT DISORDER, UNDIFFERENTIATED TYPE: Primary | ICD-10-CM

## 2024-01-23 DIAGNOSIS — F41.9 ANXIETY DISORDER, UNSPECIFIED TYPE: ICD-10-CM

## 2024-01-23 PROCEDURE — 90834 PSYTX W PT 45 MINUTES: CPT | Mod: 95 | Performed by: STUDENT IN AN ORGANIZED HEALTH CARE EDUCATION/TRAINING PROGRAM

## 2024-01-23 NOTE — PROGRESS NOTES
[facility] Sexual [facility] - Progress Note    Date of Service: 24   Name: Ramin Murcia  : 1955  Medical Record Number: [id number]  Treating Provider: Mason Adkins    Type of Session: Individual  Present in Session: Client and therapist  Session Start and Stop Time: 10:02am-10:49am  Number of Minutes:  47    SERVICE MODALITY:  Video Visit:      Provider verified identity through the following two step process.  Patient provided:  Patient is known previously to provider and Patient was verified at admission/transfer    Telemedicine Visit: The patient's condition can be safely assessed and treated via synchronous audio and visual telemedicine encounter.      Reason for Telemedicine Visit: Services only offered telehealth    Originating Site (Patient Location): Patient's home    Distant Site (Provider Location): [facility]    Consent:  The patient/guardian has verbally consented to: the potential risks and benefits of telemedicine (video visit) versus in person care; bill my insurance or make self-payment for services provided; and responsibility for payment of non-covered services.     Patient would like the video invitation sent by:  My Chart    Mode of Communication:  Video Conference via [facility]    Distant Location (Provider):  On-site    As the provider I attest to compliance with applicable laws and regulations related to telemedicine.    DSM-5 Diagnoses:  Encounter Diagnoses   Name Primary?     Unspecified disruptive, impulse control, and conduct disorder (hypersexuality) Yes     Anxiety disorder, unspecified type          Current Reported Symptoms and Status update:  Changes since last session include no boundary violations, working on relationship and sexual health, some anxiety/worry.    Patient's problems with out of control, driven, impulsive, compulsive sexual behavior began in early . He has been in a stable loving marriage for 40 years  then began having covert hook-ups with young men.  Efforts to change problematic behaviors have included several years of psychotherapy and attenance in [facility]. He continues to struggle with recurrent and intense urges which cause him distress.     Client reports experiencing some anxiety/worry and associated distress. He attributes distress to ineffective coping, patterns of avoidance/numbing, and difficulties with functioning in relationships.      Progress Toward Treatment Goals:   Satisfactory progress     Therapeutic Interventions/Treatment Strategies:    Area(s) of treatment focus addressed in this session included Symptom Management, Interpersonal Relationship Skills and Sexual Health and Wellness    Client shared adjectives he identified about himself to help with identifying core beliefs. We discussed client's self-assessment of his strengths and areas of growth. We also discussed client's motivations for working on his relationship and ways in which he can meet his wife's needs. Encouraged client to continue working on core beliefs.    Psychotherapist offered support, feedback and validation and reinforced use of skills Treatment modalities used include Cognitive Behavioral Therapy Interpersonal  Support and Feedback    Patient Response:   Patient responded to session by accepting feedback, giving feedback, listening, focusing on goals and accepting support  Possible barriers to participation / learning include: N/A    Current Mental Status Exam:   Appearance:  Appropriate   Eye Contact:  Good   Attitude / Demeanor: Cooperative   Speech      Rate / Production: Normal/ Responsive      Volume:  Normal  volume  Orientation:  All  Mood:   Normal  Affect:   Appropriate   Thought Content: Clear   Insight:   Good       Plan/Need for Future Services:  Return for therapy in 2 weeks to treat diagnosed problems.    Patient has a current master individualized treatment plan.  See Epic treatment plan for more  information.    Referral / Collaboration:  Referral to another professional/service is not indicated at this time..  Emergency Services Needed?  No    Assignment:  Core beliefs    Interactive Complexity:  There are four specific communication difficulties that complicate the work of the primary psychiatric procedure.  Interactive complexity (+36380) may be reported when at least one of these difficulties is present.    Communication difficulties present during current the psychiatric procedure include:  1. None.      Signature/Title:      Mason Adkins, PhD, LP    [facility] Sexual [facility], Sexual and Gender Health Clinic  Department of Family Medicine and Community Health  [facility] [employer]

## 2024-01-23 NOTE — NURSING NOTE
Is the patient currently in the state of [place]? YES    Visit mode:VIDEO    If the visit is dropped, the patient can be reconnected by: VIDEO VISIT: Send to e-mail at: [email or web address]    Will anyone else be joining the visit? NO  (If patient encounters technical issues they should call [phone]    How would you like to obtain your AVS? MyChart    Are changes needed to the allergy or medication list? N/A    Reason for visit: WENDY JOLLEY

## 2024-01-25 ENCOUNTER — VIRTUAL VISIT (OUTPATIENT)
Dept: PSYCHOLOGY | Facility: CLINIC | Age: 69
End: 2024-01-25
Payer: COMMERCIAL

## 2024-01-25 DIAGNOSIS — F41.9 ANXIETY DISORDER, UNSPECIFIED TYPE: ICD-10-CM

## 2024-01-25 DIAGNOSIS — F91.8 CONDUCT DISORDER, UNDIFFERENTIATED TYPE: Primary | ICD-10-CM

## 2024-01-25 PROCEDURE — 90853 GROUP PSYCHOTHERAPY: CPT | Mod: 95 | Performed by: STUDENT IN AN ORGANIZED HEALTH CARE EDUCATION/TRAINING PROGRAM

## 2024-01-25 NOTE — GROUP NOTE
[facility]  [address], [address]  [place]  [address]    Group Progress Note  [facility] - Progress Note    Date of Service: 24   Name: Ramin Murcia  : 1955  Medical Record Number: [id number]  START TIME:  4:00 PM  END TIME:  6:00 PM  FACILITATOR(S): Mason Adkins, PhD; Dior Baltazar (observing with group's consent)  TOPIC: [facility] Group Therapy  Type of Session: Group  :  Number of Attendees:  7  Number of Minutes:  120    SERVICE MODALITY:  Video Visit:      Provider verified identity through the following two step process.  Patient provided:  Patient is known previously to provider and Patient was verified at admission/transfer    Telemedicine Visit: The patient's condition can be safely assessed and treated via synchronous audio and visual telemedicine encounter.      Reason for Telemedicine Visit: Services only offered telehealth    Originating Site (Patient Location): Patient's home    Distant Site (Provider Location): [facility] Outpatient Setting: [facility]    Consent:  The patient/guardian has verbally consented to: the potential risks and benefits of telemedicine (video visit) versus in person care; bill my insurance or make self-payment for services provided; and responsibility for payment of non-covered services.     Patient would like the video invitation sent by:  Other e-mail: see chart    Mode of Communication:  Video Conference via Medical Zoom    Distant Location (Provider):  On-site    As the provider I attest to compliance with applicable laws and regulations related to telemedicine.      DSM-5 Diagnoses:  Encounter Diagnoses   Name Primary?     Unspecified disruptive, impulse control, and conduct disorder (hypersexuality) Yes     Anxiety disorder, unspecified type          Current Reported Symptoms and Status update:  Changes since last session include no boundary violations, some  anxiety, reduced alcohol use.    Patient's problems with out of control, driven, impulsive, compulsive sexual behavior began in early 2020. He has been in a stable loving marriage for 40 years then began having covert hook-ups with young men.  Efforts to change problematic behaviors have included several years of psychotherapy and attenance in [facility]. He continues to struggle with recurrent and intense urges which cause him distress.     Client reports experiencing some anxiety/worry and associated distress. He attributes distress to ineffective coping, patterns of avoidance/numbing, and difficulties with functioning in relationships.      Progress Toward Treatment Goals:   Satisfactory progress     Therapeutic Interventions/Treatment Strategies:    Area(s) of treatment focus addressed in this session included Symptom Management, Interpersonal Relationship Skills and Sexual Health and Wellness    Group members introduced themselves to Elmer Parra, who will be observing group with groups  consent. Group members shared ways in which they are managing mental and sexual health, coping with stress, and making progress towards treatment goals. Members provided each other supportive feedback and made recommendations for next steps to make progress. Client processed ways in which he is being open/honest with a friend and his wife. He also processed an episode in which his wife experienced emotional flooding and how he tried to be supportive.  Psychotherapist offered support, feedback and validation and reinforced use of skills Treatment modalities used include [facility] THERAPY INTERVENTIONS: Cognitive Behavioral Therapy Interpersonal  Support and Feedback    Patient Response:   Patient responded to session by accepting feedback, giving feedback, listening, focusing on goals and accepting support  Possible barriers to participation / learning include: N/A    Current Mental Status Exam:   Appearance:  Appropriate   Eye  Contact:  Good   Attitude / Demeanor: Cooperative   Speech      Rate / Production: Normal/ Responsive      Volume:  Normal  volume  Orientation:  All  Mood:   Normal  Affect:   Appropriate   Thought Content: Clear   Insight:   Good       Plan/Need for Future Services:  Return for therapy in 1 week to treat diagnosed problems.    Patient has a current master individualized treatment plan.  See Epic treatment plan for more information.    Referral / Collaboration:  Referral to another professional/service is not indicated at this time..  Emergency Services Needed?  No    Assignment:  Continue working on identifying core beliefs    Interactive Complexity:  There are four specific communication difficulties that complicate the work of the primary psychiatric procedure.  Interactive complexity (+83410) may be reported when at least one of these difficulties is present.    Communication difficulties present during current the psychiatric procedure include:  1. None.      Signature/Title:    Mason Adkins, PhD, LP    [facility], [facility]  Department of Family Medicine and Community Health  [facility] [employer]

## 2024-02-01 ENCOUNTER — VIRTUAL VISIT (OUTPATIENT)
Dept: PSYCHOLOGY | Facility: CLINIC | Age: 69
End: 2024-02-01
Payer: COMMERCIAL

## 2024-02-01 DIAGNOSIS — F41.9 ANXIETY DISORDER, UNSPECIFIED TYPE: ICD-10-CM

## 2024-02-01 DIAGNOSIS — F91.8 CONDUCT DISORDER, UNDIFFERENTIATED TYPE: Primary | ICD-10-CM

## 2024-02-01 PROCEDURE — 90853 GROUP PSYCHOTHERAPY: CPT | Mod: 95 | Performed by: STUDENT IN AN ORGANIZED HEALTH CARE EDUCATION/TRAINING PROGRAM

## 2024-02-01 NOTE — GROUP NOTE
[facility]  [address], [address]  [place]  [address]    Group Progress Note  [facility] - Progress Note    Date of Service: 24   Name: Ramin Murcia  : 1955  Medical Record Number: [id number]  START TIME:  4:00 PM  END TIME:  5:00 PM  FACILITATOR(S): Mason Adkins, PhD; Dior Baltazar  TOPIC: [facility] Group Therapy  Type of Session: Group  :  Number of Attendees:  5  Number of Minutes:  60    SERVICE MODALITY:  Video Visit:      Provider verified identity through the following two step process.  Patient provided:  Patient is known previously to provider and Patient was verified at admission/transfer    Telemedicine Visit: The patient's condition can be safely assessed and treated via synchronous audio and visual telemedicine encounter.    Kim Parra observed group with group members' consent      Reason for Telemedicine Visit: Services only offered telehealth    Originating Site (Patient Location): Patient's home    Distant Site (Provider Location): [facility] Outpatient Setting: [facility]    Consent:  The patient/guardian has verbally consented to: the potential risks and benefits of telemedicine (video visit) versus in person care; bill my insurance or make self-payment for services provided; and responsibility for payment of non-covered services.     Patient would like the video invitation sent by:  Other e-mail: see chart    Mode of Communication:  Video Conference via Medical Zoom    Distant Location (Provider):  On-site    As the provider I attest to compliance with applicable laws and regulations related to telemedicine.      DSM-5 Diagnoses:  Encounter Diagnoses   Name Primary?     Unspecified disruptive, impulse control, and conduct disorder (hypersexuality) Yes     Anxiety disorder, unspecified type          Current Reported Symptoms and Status update:  Changes since last session include no boundary  violations, increased urges for compulsive sexual behavior, relationship conflict, some anxiety/worry.    Patient's problems with out of control, driven, impulsive, compulsive sexual behavior began in early 2020. He has been in a stable loving marriage for 40 years then began having covert hook-ups with young men.  Efforts to change problematic behaviors have included several years of psychotherapy and attenance in [facility]. He continues to struggle with recurrent and intense urges which cause him distress.     Client reports experiencing some anxiety/worry and associated distress. He attributes distress to ineffective coping, patterns of avoidance/numbing, and difficulties with functioning in relationships.      Progress Toward Treatment Goals:   Satisfactory progress     Therapeutic Interventions/Treatment Strategies:    Area(s) of treatment focus addressed in this session included Symptom Management, Interpersonal Relationship Skills and Sexual Health and Wellness    Cognitive behavioral therapy interventions were implemented in the group process to facilitate members  treatment progress. Members provided updates about coping, self-regulation, relationship and sexual functioning, and treatment progress. Members were challenged and supported to provide process feedback - identifying emotions/internal experiences that came up for them in the moment. Client shared ways in which he is working on the who/what/why of his compulsive sexual behavior. He also processed conflict with his wife.  Psychotherapist offered support, feedback and validation and provided redirection Treatment modalities used include [facility] THERAPY INTERVENTIONS: Cognitive Behavioral Therapy  Support and Feedback    Patient Response:   Patient responded to session by accepting feedback, giving feedback, listening, focusing on goals and accepting support  Possible barriers to participation / learning include: N/A    Current Mental Status Exam:    Appearance:  Appropriate   Eye Contact:  Good   Attitude / Demeanor: Cooperative   Speech      Rate / Production: Normal/ Responsive      Volume:  Normal  volume  Orientation:  All  Mood:   Normal  Affect:   Appropriate   Thought Content: Clear   Insight:   Good       Plan/Need for Future Services:  Return for therapy in 1 week to treat diagnosed problems.    Patient has a current master individualized treatment plan.  See Epic treatment plan for more information.    Referral / Collaboration:  Referral to another professional/service is not indicated at this time..  Emergency Services eeded?  No    Assignment:  Continue working on cycles    Interactive Complexity:  There are four specific communication difficulties that complicate the work of the primary psychiatric procedure.  Interactive complexity (+55218) may be reported when at least one of these difficulties is present.    Communication difficulties present during current the psychiatric procedure include:  1. None.      Signature/Title:      Mason Adkins, PhD, LP    [facility] for Sexual [facility], Sexual and Gender Health Clinic  Department of Family Medicine and Community Health  [employer] [facility] [employer]

## 2024-02-07 ENCOUNTER — OFFICE VISIT (OUTPATIENT)
Dept: PSYCHOLOGY | Facility: CLINIC | Age: 69
End: 2024-02-07
Payer: COMMERCIAL

## 2024-02-07 DIAGNOSIS — F41.9 ANXIETY DISORDER, UNSPECIFIED TYPE: ICD-10-CM

## 2024-02-07 DIAGNOSIS — F91.8 CONDUCT DISORDER, UNDIFFERENTIATED TYPE: Primary | ICD-10-CM

## 2024-02-07 DIAGNOSIS — Z78.9 ALCOHOL USE: ICD-10-CM

## 2024-02-07 PROCEDURE — 90847 FAMILY PSYTX W/PT 50 MIN: CPT | Performed by: STUDENT IN AN ORGANIZED HEALTH CARE EDUCATION/TRAINING PROGRAM

## 2024-02-07 NOTE — PROGRESS NOTES
[facility] Sexual [facility] - Progress Note    Date of Service: 24   Name: Ramin Murcia  : 1955  Medical Record Number: [id number]  Treating Provider: Mason Adkins, PhD  Type of Session: Couple  Present in Session: Elizabeth Faustin, Therapist  Session Start and Stop Time: 4:10pm-5:05pm  Number of Minutes:  55    SERVICE MODALITY:  In-person    DSM-5 Diagnoses:  Encounter Diagnoses   Name Primary?     Unspecified disruptive, impulse control, and conduct disorder (hypersexuality) Yes     Anxiety disorder, unspecified type      Alcohol use          Current Reported Symptoms and Status update:  Changes since last session include no boundary violations, relationship conflict, anxiety, increased alcohol use.     Patient's problems with out of control, driven, impulsive, compulsive sexual behavior began in early . He has been in a stable loving marriage for 40 years then began having covert hook-ups with young men.  Efforts to change problematic behaviors have included several years of psychotherapy and attenance in [facility]. He continues to struggle with recurrent and intense urges which cause him distress.     Client reports experiencing some anxiety/worry and associated distress. He attributes distress to ineffective coping, patterns of avoidance/numbing, and difficulties with functioning in relationships.      Progress Toward Treatment Goals:   Satisfactory progress     Therapeutic Interventions/Treatment Strategies:    Area(s) of treatment focus addressed in this session included Symptom Management, Interpersonal Relationship Skills and Sexual Health and Wellness    Client and Elizabeth discussed recent conflict and ways in which they differentially view their current sexual connection. Client reflected on feeling like Elizabeth is being punitive/controlling, Elizabeth shared that she feels unsafe in the relationship. We discussed the last time Elizabeth felt connected to Ramin. We emphasized the  goal of them developing closeness rather than sex. We also identified how each are actively choosing to be in the relationship and discussed reasons why. Elizabeth shared concerns about Ramin's alcohol use. We discussed client identifying if/what supports he needs.    Psychotherapist offered support, feedback and validation and reinforced use of skills Treatment modalities used include Cognitive Behavioral Therapy Solution Focused Therapy Feminist Informed  Support and Feedback    Patient Response:   Patient responded to session by accepting feedback, giving feedback, listening, focusing on goals and accepting support  Possible barriers to participation / learning include: N/A    Current Mental Status Exam:   Appearance:  Appropriate   Eye Contact:  Good   Attitude / Demeanor: Cooperative   Speech      Rate / Production: Normal/ Responsive      Volume:  Normal  volume  Orientation:  All  Mood:   Normal  Affect:   Appropriate   Thought Content: Clear   Insight:   Good       Plan/Need for Future Services:  Return for therapy in 2 weeks to treat diagnosed problems.    Patient has a current master individualized treatment plan.  See Epic treatment plan for more information.    Referral / Collaboration:  Referral to another professional/service is not indicated at this time..  Emergency Services Needed?  No    Assignment:  Identify if/what supports for alcohol use    Interactive Complexity:  There are four specific communication difficulties that complicate the work of the primary psychiatric procedure.  Interactive complexity (+77337) may be reported when at least one of these difficulties is present.    Communication difficulties present during current the psychiatric procedure include:  1. None.      Signature/Title:      Mason Adkins, PhD, LP    [facility], Sexual and Gender Health Clinic  Department of Family Medicine and [facility]  [facility] [employer]

## 2024-02-15 ENCOUNTER — VIRTUAL VISIT (OUTPATIENT)
Dept: PSYCHOLOGY | Facility: CLINIC | Age: 69
End: 2024-02-15
Payer: COMMERCIAL

## 2024-02-15 DIAGNOSIS — F91.8 CONDUCT DISORDER, UNDIFFERENTIATED TYPE: Primary | ICD-10-CM

## 2024-02-15 DIAGNOSIS — Z78.9 ALCOHOL USE: ICD-10-CM

## 2024-02-15 DIAGNOSIS — F41.9 ANXIETY DISORDER, UNSPECIFIED TYPE: ICD-10-CM

## 2024-02-15 PROCEDURE — 90853 GROUP PSYCHOTHERAPY: CPT | Mod: 95 | Performed by: STUDENT IN AN ORGANIZED HEALTH CARE EDUCATION/TRAINING PROGRAM

## 2024-02-15 NOTE — GROUP NOTE
[facility]  [address], [address]  [place]  [address]    Group Progress Note  [facility] - Progress Note    Date of Service: 2/15/24   Name: Ramin Murcia  : 1955  Medical Record Number: [id number]  START TIME:  4:00 PM  END TIME:  5:30 PM  FACILITATOR(S): Mason Adkins, PhD; Dior Baltazar  TOPIC: [facility] Group Therapy  Type of Session: Group  :  Number of Attendees:  7  Number of Minutes:  90    SERVICE MODALITY:  Video Visit:      Provider verified identity through the following two step process.  Patient provided:  Patient is known previously to provider    Telemedicine Visit: The patient's condition can be safely assessed and treated via synchronous audio and visual telemedicine encounter.      Reason for Telemedicine Visit: Services only offered telehealth    Originating Site (Patient Location): Patient's home    Distant Site (Provider Location): [facility] Clinics: Sexual and Gender Health Clinic    Consent:  The patient/guardian has verbally consented to: the potential risks and benefits of telemedicine (video visit) versus in person care; bill my insurance or make self-payment for services provided; and responsibility for payment of non-covered services.     Patient would like the video invitation sent by:  Other e-mail: see chart    Mode of Communication:  Video Conference via Medical Zoom    Distant Location (Provider):  On-site    As the provider I attest to compliance with applicable laws and regulations related to telemedicine.    Practicum observer Kim Parra observed with group consent.      DSM-5 Diagnoses:  Encounter Diagnoses   Name Primary?     Unspecified disruptive, impulse control, and conduct disorder (hypersexuality) Yes     Anxiety disorder, unspecified type      Alcohol use          Current Reported Symptoms and Status update:  Changes since last session includes no boundary violations, some urges, working on  decisions about alcohol use, some anxiety and avoidance.    Patient's problems with out of control, driven, impulsive, compulsive sexual behavior began in early 2020. He has been in a stable loving marriage for 40 years then began having covert hook-ups with young men.  Efforts to change problematic behaviors have included several years of psychotherapy and attenance in [facility]. He continues to struggle with recurrent and intense urges which cause him distress.     Client reports experiencing some anxiety/worry and associated distress. He attributes distress to ineffective coping, patterns of avoidance/numbing, and difficulties with functioning in relationships.      Progress Toward Treatment Goals:   Satisfactory progress     Therapeutic Interventions/Treatment Strategies:    Area(s) of treatment focus addressed in this session included Symptom Management, Interpersonal Relationship Skills and Sexual Health and Wellness    Group members provided check-ins regarding sexual health, mental health, coping, and self-care. Members also provided updates on homework progress and overall treatment progress. Clients practiced identifying and expressing emotions with each other. Members provided each other supportive, constructive feedback. Client processed difficult emotions with regard to his couples therapy and changes in their dynamic and relationship. Client also felt discrepancies between his progress and his wife's progress.  Psychotherapist offered support, feedback and validation and reinforced use of skills Treatment modalities used include [facility] THERAPY INTERVENTIONS: Cognitive Behavioral Therapy Interpersonal  Support and Feedback    Patient Response:   Patient responded to session by accepting feedback, giving feedback, listening, focusing on goals and accepting support  Possible barriers to participation / learning include: N/A    Current Mental Status Exam:   Appearance:  Appropriate   Eye Contact:  Good   Attitude /  Demeanor: Cooperative   Speech      Rate / Production: Normal/ Responsive      Volume:  Normal  volume  Orientation:  All  Mood:   Normal  Affect:   Appropriate   Thought Content: Clear   Insight:   Good       Plan/Need for Future Services:  Return for therapy in 1 week to treat diagnosed problems.    Patient has a current master individualized treatment plan.  See Epic treatment plan for more information.    Referral / Collaboration:  Referral to another professional/service is not indicated at this time..  Emergency Services Needed?  No    Assignment:  Continue self-care    Interactive Complexity:  There are four specific communication difficulties that complicate the work of the primary psychiatric procedure.  Interactive complexity (+81373) may be reported when at least one of these difficulties is present.    Communication difficulties present during current the psychiatric procedure include:  1. None.      Signature/Title:    Mason Adkins, PhD, LP    [facility] for Sexual [facility] Gender [facility], Sexual and Gender Health Clinic  Department of Family Medicine and Community Health  [employer] [facility] [employer]

## 2024-02-19 ENCOUNTER — OFFICE VISIT (OUTPATIENT)
Dept: PSYCHOLOGY | Facility: CLINIC | Age: 69
End: 2024-02-19
Payer: COMMERCIAL

## 2024-02-19 DIAGNOSIS — F91.8 CONDUCT DISORDER, UNDIFFERENTIATED TYPE: Primary | ICD-10-CM

## 2024-02-19 DIAGNOSIS — Z78.9 ALCOHOL USE: ICD-10-CM

## 2024-02-19 DIAGNOSIS — F41.9 ANXIETY DISORDER, UNSPECIFIED TYPE: ICD-10-CM

## 2024-02-19 PROCEDURE — 90847 FAMILY PSYTX W/PT 50 MIN: CPT | Performed by: STUDENT IN AN ORGANIZED HEALTH CARE EDUCATION/TRAINING PROGRAM

## 2024-02-19 NOTE — PROGRESS NOTES
[facility] for Sexual and Gender Health - Progress Note    Date of Service: 24   Name: Ramin Murcia  : 1955  Medical Record Number: [id number]  Treating Provider: Mason Adkins, PhD  Type of Session: Couple  Present in Session: Elizabeth Faustin Therapist  Session Start and Stop Time: 11:00am-12:00pm  Number of Minutes:  60    SERVICE MODALITY:  In-person    DSM-5 Diagnoses:  Encounter Diagnoses   Name Primary?     Unspecified disruptive, impulse control, and conduct disorder (hypersexuality) Yes     Anxiety disorder, unspecified type      Alcohol use          Current Reported Symptoms and Status update:  Changes since last session include no boundary violations; difficulties coping with distress; increased sexual urges; some anxiety/worry; continued alcohol use but working with his wife to identify what is healthy for him/them.    Patient's problems with out of control, driven, impulsive, compulsive sexual behavior began in early . He has been in a stable loving marriage for 40 years then began having covert hook-ups with young men.  Efforts to change problematic behaviors have included several years of psychotherapy and attenance in [facility]. He continues to struggle with recurrent and intense urges which cause him distress.     Client reports experiencing some anxiety/worry and associated distress. He attributes distress to ineffective coping, patterns of avoidance/numbing, and difficulties with functioning in relationships.      Progress Toward Treatment Goals:   Satisfactory progress     Therapeutic Interventions/Treatment Strategies:    Area(s) of treatment focus addressed in this session included Interpersonal Relationship Skills and Sexual Health and Wellness    Client and his wife joined for relationship therapy to address effects of compulsive sexual behavior on their marriage and work on intimacy and communication repair secondary to CSB. Both processed recent interactions and conflicts that  "have made their relationship feel like it's \"stuck in a rut.\" Therapist supported each individual to identify and express feelings and needs/wants. Therapist supported couple to identify changes they can make to meet their own needs and each other's. We discussed ways in which they could build connection - primarily through client engaging more and avoiding less; both individuals expressing feelings; exploring activities of interest; renegotiating division of responsibilities and labor.    Psychotherapist offered support, feedback and validation and reinforced use of skills Treatment modalities used include Solution Focused Therapy  Support and Feedback    Patient Response:   Patient responded to session by accepting feedback, giving feedback, listening, focusing on goals and accepting support  Possible barriers to participation / learning include: N/A    Current Mental Status Exam:   Appearance:  Appropriate   Eye Contact:  Good   Attitude / Demeanor: Cooperative   Speech      Rate / Production: Normal/ Responsive      Volume:  Normal  volume  Orientation:  All  Mood:   Normal  Affect:   Appropriate   Thought Content: Clear   Insight:   Good       Plan/Need for Future Services:  Return for therapy in 2 weeks to treat diagnosed problems.    Patient has a current master individualized treatment plan.  See Epic treatment plan for more information.    Referral / Collaboration:  Referral to another professional/service is not indicated at this time..  Emergency Services Needed?  No    Assignment:  Renegotiate household tasks  Daily opportunities to check in and express care    Interactive Complexity:  There are four specific communication difficulties that complicate the work of the primary psychiatric procedure.  Interactive complexity (+26111) may be reported when at least one of these difficulties is present.    Communication difficulties present during current the psychiatric procedure " include:  1. None.      Signature/Title:      Mason Adkins, PhD, LP    [facility] for Sexual and Gender [facility], Sexual and Gender Health Clinic  Department of Family Medicine and Community Health  [facility]

## 2024-02-29 ENCOUNTER — VIRTUAL VISIT (OUTPATIENT)
Dept: PSYCHOLOGY | Facility: CLINIC | Age: 69
End: 2024-02-29
Payer: COMMERCIAL

## 2024-02-29 DIAGNOSIS — F41.9 ANXIETY DISORDER, UNSPECIFIED TYPE: ICD-10-CM

## 2024-02-29 DIAGNOSIS — F91.8 CONDUCT DISORDER, UNDIFFERENTIATED TYPE: Primary | ICD-10-CM

## 2024-02-29 PROCEDURE — 90853 GROUP PSYCHOTHERAPY: CPT | Mod: 95 | Performed by: STUDENT IN AN ORGANIZED HEALTH CARE EDUCATION/TRAINING PROGRAM

## 2024-02-29 NOTE — GROUP NOTE
[facility]  [address], [address]  [place]  [address]    Group Progress Note  [facility] - Progress Note    Date of Service: 24   Name: Ramin Murcia  : 1955  Medical Record Number: [id number]  START TIME:  4:00 PM  END TIME:  6:00 PM  FACILITATOR(S): Mason Adkins, PhD; Dior Baltazar  TOPIC: [facility] Group Therapy  Type of Session: Group  :  Number of Attendees:  8  Number of Minutes:  120    SERVICE MODALITY:  Video Visit:      Provider verified identity through the following two step process.  Patient provided:  Patient is known previously to provider    Telemedicine Visit: The patient's condition can be safely assessed and treated via synchronous audio and visual telemedicine encounter.      Reason for Telemedicine Visit: Services only offered telehealth    Originating Site (Patient Location): Patient's home    Distant Site (Provider Location): [facility] Outpatient Setting: Sexual [facility] Health Clinic    Consent:  The patient/guardian has verbally consented to: the potential risks and benefits of telemedicine (video visit) versus in person care; bill my insurance or make self-payment for services provided; and responsibility for payment of non-covered services.     Patient would like the video invitation sent by:  Other e-mail: see chart    Mode of Communication:  Video Conference via Medical Zoom    Distant Location (Provider):  On-site    As the provider I attest to compliance with applicable laws and regulations related to telemedicine.      DSM-5 Diagnoses:  Encounter Diagnoses   Name Primary?     Unspecified disruptive, impulse control, and conduct disorder (hypersexuality) Yes     Anxiety disorder, unspecified type          Current Reported Symptoms and Status update:  Changes since last session includes no boundary violations, working on coping, some anxiety/worry, conflict with partner.    Patient's problems with out of  control, driven, impulsive, compulsive sexual behavior began in early 2020. He has been in a stable loving marriage for 40 years then began having covert hook-ups with young men.  Efforts to change problematic behaviors have included several years of psychotherapy and attenance in [facility]. He continues to struggle with recurrent and intense urges which cause him distress.     Client reports experiencing some anxiety/worry and associated distress. He attributes distress to ineffective coping, patterns of avoidance/numbing, and difficulties with functioning in relationships.      Progress Toward Treatment Goals:   Satisfactory progress     Therapeutic Interventions/Treatment Strategies:    Area(s) of treatment focus addressed in this session included Symptom Management, Interpersonal Relationship Skills and Sexual Health and Wellness    Group members started group by sharing updates to compulsive sexual behavior symptoms, coping, co-occurring mental health symptoms, and relationship functioning. Members were encouraged to provide updates on their homework progress. Group members processed ways in which they are working on their relationship and sexual health. Members provided each other constructive, supportive feedback. Client reflected on ways in which he and his wife have had more intimacy. He provided helpful feedback to others and reflected positively on the group process.  Psychotherapist offered support, feedback and validation and reinforced use of skills Treatment modalities used include [facility] THERAPY INTERVENTIONS: Cognitive Behavioral Therapy Interpersonal  Support and Feedback    Patient Response:   Patient responded to session by accepting feedback, giving feedback, listening, focusing on goals and accepting support  Possible barriers to participation / learning include: N/A    Current Mental Status Exam:   Appearance:  Appropriate   Eye Contact:  Good   Attitude / Demeanor: Cooperative   Speech      Rate /  Production: Normal/ Responsive      Volume:  Normal  volume  Orientation:  All  Mood:   Normal  Affect:   Appropriate   Thought Content: Clear   Insight:   Good       Plan/Need for Future Services:  Return for therapy in 1 week to treat diagnosed problems.    Patient has a current master individualized treatment plan.  See Epic treatment plan for more information.    Referral / Collaboration:  Referral to another professional/service is not indicated at this time..  Emergency Services Needed?  No    Assignment:  Continues couples therapy    Interactive Complexity:  There are four specific communication difficulties that complicate the work of the primary psychiatric procedure.  Interactive complexity (+11069) may be reported when at least one of these difficulties is present.    Communication difficulties present during current the psychiatric procedure include:  1. None.      Signature/Title:    Mason Adkins, PhD

## 2024-03-02 ASSESSMENT — ACTIVITIES OF DAILY LIVING (ADL)
IN_THE_PAST_7_DAYS,_DID_YOU_NEED_HELP_FROM_OTHERS_TO_TAKE_CARE_OF_THINGS_SUCH_AS_LAUNDRY_AND_HOUSEKEEPING,_BANKING,_SHOPPING,_USING_THE_TELEPHONE,_FOOD_PREPARATION,_TRANSPORTATION,_OR_TAKING_YOUR_OWN_MEDICATIONS?: N
IN_THE_PAST_7_DAYS,_DID_YOU_NEED_HELP_FROM_OTHERS_TO_PERFORM_EVERYDAY_ACTIVITIES_SUCH_AS_EATING,_GETTING_DRESSED,_GROOMING,_BATHING,_WALKING,_OR_USING_THE_TOILET: N

## 2024-03-02 ASSESSMENT — ANXIETY QUESTIONNAIRES
2. NOT BEING ABLE TO STOP OR CONTROL WORRYING: NOT AT ALL
GAD7 TOTAL SCORE: 1
7. FEELING AFRAID AS IF SOMETHING AWFUL MIGHT HAPPEN: NOT AT ALL
5. BEING SO RESTLESS THAT IT IS HARD TO SIT STILL: NOT AT ALL
6. BECOMING EASILY ANNOYED OR IRRITABLE: SEVERAL DAYS
8. IF YOU CHECKED OFF ANY PROBLEMS, HOW DIFFICULT HAVE THESE MADE IT FOR YOU TO DO YOUR WORK, TAKE CARE OF THINGS AT HOME, OR GET ALONG WITH OTHER PEOPLE?: NOT DIFFICULT AT ALL
IF YOU CHECKED OFF ANY PROBLEMS ON THIS QUESTIONNAIRE, HOW DIFFICULT HAVE THESE PROBLEMS MADE IT FOR YOU TO DO YOUR WORK, TAKE CARE OF THINGS AT HOME, OR GET ALONG WITH OTHER PEOPLE: NOT DIFFICULT AT ALL
4. TROUBLE RELAXING: NOT AT ALL
7. FEELING AFRAID AS IF SOMETHING AWFUL MIGHT HAPPEN: NOT AT ALL
GAD7 TOTAL SCORE: 1
1. FEELING NERVOUS, ANXIOUS, OR ON EDGE: NOT AT ALL
3. WORRYING TOO MUCH ABOUT DIFFERENT THINGS: NOT AT ALL

## 2024-03-04 ENCOUNTER — OFFICE VISIT (OUTPATIENT)
Dept: PSYCHOLOGY | Facility: CLINIC | Age: 69
End: 2024-03-04
Payer: COMMERCIAL

## 2024-03-04 DIAGNOSIS — Z78.9 ALCOHOL USE: ICD-10-CM

## 2024-03-04 DIAGNOSIS — F41.9 ANXIETY DISORDER, UNSPECIFIED TYPE: ICD-10-CM

## 2024-03-04 DIAGNOSIS — F91.8 CONDUCT DISORDER, UNDIFFERENTIATED TYPE: Primary | ICD-10-CM

## 2024-03-04 PROCEDURE — 90847 FAMILY PSYTX W/PT 50 MIN: CPT | Performed by: STUDENT IN AN ORGANIZED HEALTH CARE EDUCATION/TRAINING PROGRAM

## 2024-03-04 NOTE — PROGRESS NOTES
[place] [facility] - Progress Note    Date of Service: 3/04/24   Name: Ramin Murcia  : 1955  Medical Record Number: [id number]  Treating Provider: Mason Adkins, PhD  Type of Session: Couple  Present in Session: Elizabeth Faustin, therapist  Session Start and Stop Time: 11:00am-11:55am  Number of Minutes:  55    SERVICE MODALITY:  In-person    DSM-5 Diagnoses:  Encounter Diagnoses   Name Primary?     Unspecified disruptive, impulse control, and conduct disorder (hypersexuality) Yes     Anxiety disorder, unspecified type      Alcohol use          Current Reported Symptoms and Status update:  Changes since last session include no boundary violations, feeling optimistic about relationship, some anxiety and worry, ongoing alcohol use.    Patient's problems with out of control, driven, impulsive, compulsive sexual behavior began in early . He has been in a stable loving marriage for 40 years then began having covert hook-ups with young men.  Efforts to change problematic behaviors have included several years of psychotherapy and attenance in [facility]. He continues to struggle with recurrent and intense urges which cause him distress.     Client reports experiencing some anxiety/worry and associated distress. He attributes distress to ineffective coping, patterns of avoidance/numbing, and difficulties with functioning in relationships.      Progress Toward Treatment Goals:   Satisfactory progress     Therapeutic Interventions/Treatment Strategies:    Area(s) of treatment focus addressed in this session included Symptom Management and Interpersonal Relationship Skills    Ramin and Elizabeth discussed recent communication and connection. Ramin expressed optimism for their relationship and recent connection. Elizabeth reported having difficulty identifying needs and wants, partly due to her upbringing of not being called selfish for wanting things. They discussed challenges in the relationship due to not  "meeting needs/wants. Therapist supported client and Lerona to practice identifying and expressing feelings. Elizabeth expressed desire for more reciprocity and emotional connection. Therapist noted patterns of interpreting each other's meaning and intentions. We planned for therapist to interrupt this process and help the couple change the dynamic in future sessions. Ramin said he would be willing to work more on emotional connection and meeting Lerona's wants. They identified a pattern wherein Ramin's needs are met by Lerona and Elizabeth has suppressed expressing needs. We also identified a pattern of both not being \"warm fuzzy\" people expressing feelings and connection unless certain conditions are met. They also identified client's CSB history as a contributing factor to emotional distance.     Psychotherapist offered support, feedback and validation and reinforced use of skills Treatment modalities used include Cognitive Behavioral Therapy Emotionally Focused Therapy  Support and Feedback    Patient Response:   Patient responded to session by accepting feedback, giving feedback, listening, focusing on goals and accepting support  Possible barriers to participation / learning include: N/A    Current Mental Status Exam:   Appearance:  Appropriate   Eye Contact:  Good   Attitude / Demeanor: Cooperative   Speech      Rate / Production: Normal/ Responsive      Volume:  Normal  volume  Orientation:  All  Mood:   Normal  Affect:   Appropriate   Thought Content: Clear   Insight:   Good       Plan/Need for Future Services:  Return for therapy in 2 weeks to treat diagnosed problems.    Patient has a current master individualized treatment plan.  See Epic treatment plan for more information.    Referral / Collaboration:  Referral to another professional/service is not indicated at this time..  Emergency Services Needed?  No    Assignment:  Ramin to continue CSB homework  Lerona to check in with herself about " wants/needs    Interactive Complexity:  There are four specific communication difficulties that complicate the work of the primary psychiatric procedure.  Interactive complexity (+25980) may be reported when at least one of these difficulties is present.    Communication difficulties present during current the psychiatric procedure include:  1. None.      Signature/Title:      Mason Adkins, PhD, LP    [place] [facility], Sexual [facility] Health Clinic  Department of Family Medicine and Community Health  [facility]

## 2024-03-07 ENCOUNTER — VIRTUAL VISIT (OUTPATIENT)
Dept: PSYCHOLOGY | Facility: CLINIC | Age: 69
End: 2024-03-07
Payer: COMMERCIAL

## 2024-03-07 DIAGNOSIS — F91.8 CONDUCT DISORDER, UNDIFFERENTIATED TYPE: Primary | ICD-10-CM

## 2024-03-07 DIAGNOSIS — F41.9 ANXIETY DISORDER, UNSPECIFIED TYPE: ICD-10-CM

## 2024-03-07 DIAGNOSIS — Z78.9 ALCOHOL USE: ICD-10-CM

## 2024-03-07 PROCEDURE — 90853 GROUP PSYCHOTHERAPY: CPT | Mod: 95 | Performed by: STUDENT IN AN ORGANIZED HEALTH CARE EDUCATION/TRAINING PROGRAM

## 2024-03-07 NOTE — GROUP NOTE
[facility]  [address], [address]  [place]  [address]    Group Progress Note  [facility] - Progress Note    Date of Service: 3/07/24   Name: Ramin Murcia  : 1955  Medical Record Number: [id number]  START TIME:  4:00 PM  END TIME:  6:00 PM  FACILITATOR(S): Mason Adkins, PhD; Dior Baltazar  TOPIC: [facility] Group Therapy  Type of Session: Group  :  Number of Attendees:  6  Number of Minutes:  120    SERVICE MODALITY:  Video Visit:      Provider verified identity through the following two step process.  Patient provided:  Patient is known previously to provider    Telemedicine Visit: The patient's condition can be safely assessed and treated via synchronous audio and visual telemedicine encounter.      Reason for Telemedicine Visit: Services only offered telehealth    Originating Site (Patient Location): Patient's home    Distant Site (Provider Location): [facility]: Sexual and Gender Health Clinic    Consent:  The patient/guardian has verbally consented to: the potential risks and benefits of telemedicine (video visit) versus in person care; bill my insurance or make self-payment for services provided; and responsibility for payment of non-covered services.     Patient would like the video invitation sent by:  Other e-mail: see chart    Mode of Communication:  Video Conference via Medical Zoom    Distant Location (Provider):  On-site    As the provider I attest to compliance with applicable laws and regulations related to telemedicine.    LPC trainee Kim Parra attended with group's consent      DSM-5 Diagnoses:  Encounter Diagnoses   Name Primary?     Unspecified disruptive, impulse control, and conduct disorder (hypersexuality) Yes     Anxiety disorder, unspecified type      Alcohol use          Current Reported Symptoms and Status update:  Changes since last session include increased urges to violate boundaries, some relationship  problems, sexual compulsivity, ongoing alcohol use, some anxiety.    Patient's problems with out of control, driven, impulsive, compulsive sexual behavior began in early 2020. He has been in a stable loving marriage for 40 years then began having covert hook-ups with young men.  Efforts to change problematic behaviors have included several years of psychotherapy and attenance in [facility]. He continues to struggle with recurrent and intense urges which cause him distress.     Client reports experiencing some anxiety/worry and associated distress. He attributes distress to ineffective coping, patterns of avoidance/numbing, and difficulties with functioning in relationships.      Progress Toward Treatment Goals:   Satisfactory progress     Therapeutic Interventions/Treatment Strategies:    Area(s) of treatment focus addressed in this session included Symptom Management, Interpersonal Relationship Skills and Sexual Health and Wellness    Group members shared weekly updates regarding mental health, sexual health, relationship functioning, coping, and self-care. Members provided progress updates on homework and therapy. CBT and Interpersonal interventions were implemented to support members  progress and group therapy. Members were supported to provide each other process- and constructive- feedback. Client processed progress in his relationship therapy and ways in which he is working on acceptance of current relationship issues. He also practiced perspective taking in understanding his wife's needs and concerns. He was active in the group discussion and provided others with feedback.  Psychotherapist offered support, feedback and validation and reinforced use of skills Treatment modalities used include [facility] THERAPY INTERVENTIONS: Cognitive Behavioral Therapy Interpersonal  Support and Feedback    Patient Response:   Patient responded to session by accepting feedback, giving feedback, listening, focusing on goals and accepting  support  Possible barriers to participation / learning include: N/A    Current Mental Status Exam:   Appearance:  Appropriate    Eye Contact:  Good   Attitude / Demeanor: Cooperative   Speech      Rate / Production: Normal/ Responsive      Volume:  Normal  volume  Orientation:  All  Mood:   Normal  Affect:   Appropriate   Thought Content: Clear   Insight:   Good       Plan/Need for Future Services:  Return for therapy in 1 week to treat diagnosed problems.    Patient has a current master individualized treatment plan.  See Epic treatment plan for more information.    Referral / Collaboration:  Referral to another professional/service is not indicated at this time..  Emergency Services Needed?  No    Assignment:  Continue working on communication and perspective taking    Interactive Complexity:  There are four specific communication difficulties that complicate the work of the primary psychiatric procedure.  Interactive complexity (+02944) may be reported when at least one of these difficulties is present.    Communication difficulties present during current the psychiatric procedure include:  1. None.      Signature/Title:    Mason Adkins, PhD, LP    [facility] [facility], Sexual [facility] Health Clinic  Department of Family Medicine and Community Health  [facility]

## 2024-03-10 ENCOUNTER — HEALTH MAINTENANCE LETTER (OUTPATIENT)
Age: 69
End: 2024-03-10

## 2024-03-14 ENCOUNTER — VIRTUAL VISIT (OUTPATIENT)
Dept: PSYCHOLOGY | Facility: CLINIC | Age: 69
End: 2024-03-14
Payer: COMMERCIAL

## 2024-03-14 DIAGNOSIS — F41.9 ANXIETY DISORDER, UNSPECIFIED TYPE: ICD-10-CM

## 2024-03-14 DIAGNOSIS — Z78.9 ALCOHOL USE: ICD-10-CM

## 2024-03-14 DIAGNOSIS — F91.8 CONDUCT DISORDER, UNDIFFERENTIATED TYPE: Primary | ICD-10-CM

## 2024-03-14 PROCEDURE — 90853 GROUP PSYCHOTHERAPY: CPT | Mod: 95 | Performed by: STUDENT IN AN ORGANIZED HEALTH CARE EDUCATION/TRAINING PROGRAM

## 2024-03-18 ENCOUNTER — VIRTUAL VISIT (OUTPATIENT)
Dept: INTERNAL MEDICINE | Facility: CLINIC | Age: 69
End: 2024-03-18
Payer: COMMERCIAL

## 2024-03-18 DIAGNOSIS — Z00.00 VISIT FOR PREVENTIVE HEALTH EXAMINATION: Primary | ICD-10-CM

## 2024-03-18 DIAGNOSIS — Z00.00 VISIT FOR PREVENTIVE HEALTH EXAMINATION: ICD-10-CM

## 2024-03-18 DIAGNOSIS — Z00.00 ENCOUNTER FOR PREVENTIVE HEALTH EXAMINATION: Primary | ICD-10-CM

## 2024-03-18 PROBLEM — F10.11 NONDEPENDENT ALCOHOL ABUSE, IN REMISSION: Status: ACTIVE | Noted: 2020-11-19

## 2024-03-18 PROBLEM — Z85.820 PERSONAL HISTORY OF MALIGNANT MELANOMA OF SKIN: Status: ACTIVE | Noted: 2018-03-01

## 2024-03-18 PROBLEM — M85.80 OSTEOPENIA: Status: ACTIVE | Noted: 2020-11-20

## 2024-03-18 PROBLEM — Z86.0101 HISTORY OF ADENOMATOUS POLYP OF COLON: Status: ACTIVE | Noted: 2021-02-26

## 2024-03-18 PROBLEM — M47.816 SPONDYLOSIS OF LUMBAR SPINE: Status: ACTIVE | Noted: 2017-04-10

## 2024-03-18 PROBLEM — M48.00 SPINAL STENOSIS: Status: ACTIVE | Noted: 2017-04-19

## 2024-03-18 PROCEDURE — 99207 PR NO CHARGE LOS: CPT | Mod: 93

## 2024-03-18 RX ORDER — CICLOPIROX 1 G/100ML
1 SHAMPOO TOPICAL
COMMUNITY
Start: 2023-11-29

## 2024-03-18 RX ORDER — FLUOCINONIDE TOPICAL SOLUTION USP, 0.05% 0.5 MG/ML
1 SOLUTION TOPICAL
COMMUNITY
Start: 2022-08-25

## 2024-03-18 NOTE — PROGRESS NOTES
Health Maintenance:  Do you have a PCP? Yes  When was your last visit with your PCP?   When was your last eye exam? 9/22  Have you ever had a colonoscopy? Yes   If yes, when? 2 years  Have you ever had any polyps removed? Yes       As part of your visit we will set up a DEXA scan which will measure your body composition. We have a few questions that need to be answered before we can schedule this scan:   What is your approximate weight? 200   Have you ever had a DEXA scan within the past 2 years? No   Will you have any other imaging studies with contrast (x-ray, CT scan) within 7 days of this appointment? No   Have you had any spine or hip surgery? Yes   Do you take any vitamins that contain calcium or antacids with calcium? No    If yes, stop taking 24 hours prior to visit.     Goals for the Visit:  Thorough Comprehensive Preventive Exam  Weight loss  Pertinent past Medical/Family and Social HX:   Pertinent sx that desire are addressed with this visit:     Instructions prior to appointment:   1. Fast beginning at 10 pm for lab appointment  2. If your preventive care assessment package includes a Fitness Assessment, please bring athletic shoes. Complementary [facility] fitness attire is provided and yours to keep.  3. If eye exam, eyes may be dilated, it will last 4-6 hours, may want to bring sunglasses.   4. May bring laptop or other work materials for use during downtime.   5. You will receive an email about 3 days prior to your visit with a final itinerary, menu selections for the complementary breakfast and lunch and instructions for the visit.     Complimentary  Parking provided. Drop off car in front of [facility], take the patient elevators to the [facility]. When you enter in the lobby, identify yourself as an [facility] [atient and you will be escorted up to the clinic.   If questions arise prior to your appointment please contact the  [facility] at [phone].

## 2024-03-19 ENCOUNTER — OFFICE VISIT (OUTPATIENT)
Dept: INTERNAL MEDICINE | Facility: CLINIC | Age: 69
End: 2024-03-19
Payer: COMMERCIAL

## 2024-03-19 ENCOUNTER — ANCILLARY PROCEDURE (OUTPATIENT)
Dept: BONE DENSITY | Facility: CLINIC | Age: 69
End: 2024-03-19
Payer: COMMERCIAL

## 2024-03-19 ENCOUNTER — OFFICE VISIT (OUTPATIENT)
Dept: PULMONOLOGY | Facility: CLINIC | Age: 69
End: 2024-03-19
Payer: COMMERCIAL

## 2024-03-19 ENCOUNTER — OFFICE VISIT (OUTPATIENT)
Dept: OPHTHALMOLOGY | Facility: CLINIC | Age: 69
End: 2024-03-19
Payer: COMMERCIAL

## 2024-03-19 ENCOUNTER — OFFICE VISIT (OUTPATIENT)
Dept: AUDIOLOGY | Facility: CLINIC | Age: 69
End: 2024-03-19
Payer: COMMERCIAL

## 2024-03-19 ENCOUNTER — OFFICE VISIT (OUTPATIENT)
Dept: DERMATOLOGY | Facility: CLINIC | Age: 69
End: 2024-03-19
Payer: COMMERCIAL

## 2024-03-19 ENCOUNTER — APPOINTMENT (OUTPATIENT)
Dept: INTERNAL MEDICINE | Facility: CLINIC | Age: 69
End: 2024-03-19
Payer: COMMERCIAL

## 2024-03-19 VITALS
DIASTOLIC BLOOD PRESSURE: 83 MMHG | RESPIRATION RATE: 18 BRPM | TEMPERATURE: 97.7 F | SYSTOLIC BLOOD PRESSURE: 126 MMHG | OXYGEN SATURATION: 95 % | HEART RATE: 71 BPM

## 2024-03-19 DIAGNOSIS — Z00.00 VISIT FOR PREVENTIVE HEALTH EXAMINATION: ICD-10-CM

## 2024-03-19 DIAGNOSIS — D22.9 MULTIPLE BENIGN NEVI: Primary | ICD-10-CM

## 2024-03-19 DIAGNOSIS — I35.1 AORTIC VALVE INSUFFICIENCY, ETIOLOGY OF CARDIAC VALVE DISEASE UNSPECIFIED: ICD-10-CM

## 2024-03-19 DIAGNOSIS — R97.20 ELEVATED PROSTATE SPECIFIC ANTIGEN (PSA): ICD-10-CM

## 2024-03-19 DIAGNOSIS — Z85.828 HX OF SQUAMOUS CELL CARCINOMA OF SKIN: ICD-10-CM

## 2024-03-19 DIAGNOSIS — H52.4 PRESBYOPIA: ICD-10-CM

## 2024-03-19 DIAGNOSIS — R94.31 NONSPECIFIC ABNORMAL ELECTROCARDIOGRAM (ECG) (EKG): ICD-10-CM

## 2024-03-19 DIAGNOSIS — I71.21 ANEURYSM OF ASCENDING AORTA WITHOUT RUPTURE (H): ICD-10-CM

## 2024-03-19 DIAGNOSIS — M85.9 LOW BONE DENSITY: Primary | ICD-10-CM

## 2024-03-19 DIAGNOSIS — Z00.00 ENCOUNTER FOR PREVENTIVE HEALTH EXAMINATION: ICD-10-CM

## 2024-03-19 DIAGNOSIS — R31.29 MICROSCOPIC HEMATURIA: ICD-10-CM

## 2024-03-19 DIAGNOSIS — Z85.820 HISTORY OF MALIGNANT MELANOMA OF SKIN: ICD-10-CM

## 2024-03-19 DIAGNOSIS — L82.1 SEBORRHEIC KERATOSES: ICD-10-CM

## 2024-03-19 DIAGNOSIS — H90.3 BILATERAL SENSORINEURAL HEARING LOSS: ICD-10-CM

## 2024-03-19 DIAGNOSIS — N40.1 BENIGN PROSTATIC HYPERPLASIA WITH URINARY FREQUENCY: ICD-10-CM

## 2024-03-19 DIAGNOSIS — L21.9 DERMATITIS, SEBORRHEIC: ICD-10-CM

## 2024-03-19 DIAGNOSIS — B35.3 TINEA PEDIS OF LEFT FOOT: ICD-10-CM

## 2024-03-19 DIAGNOSIS — H90.6 MIXED HEARING LOSS, BILATERAL: Primary | ICD-10-CM

## 2024-03-19 DIAGNOSIS — H43.813 POSTERIOR VITREOUS DETACHMENT OF BOTH EYES: ICD-10-CM

## 2024-03-19 DIAGNOSIS — M85.9 LOW BONE DENSITY: ICD-10-CM

## 2024-03-19 DIAGNOSIS — R35.0 BENIGN PROSTATIC HYPERPLASIA WITH URINARY FREQUENCY: ICD-10-CM

## 2024-03-19 DIAGNOSIS — H25.13 NUCLEAR SENILE CATARACT OF BOTH EYES: Primary | ICD-10-CM

## 2024-03-19 LAB
ALBUMIN UR-MCNC: NEGATIVE MG/DL
ALP SERPL-CCNC: 47 U/L (ref 40–150)
ALT SERPL W P-5'-P-CCNC: 18 U/L (ref 0–70)
APPEARANCE UR: CLEAR
BASOPHILS # BLD AUTO: 0.1 10E3/UL (ref 0–0.2)
BASOPHILS NFR BLD AUTO: 1 %
BILIRUB UR QL STRIP: NEGATIVE
CALCIUM SERPL-MCNC: 9.6 MG/DL (ref 8.8–10.2)
CHOLEST SERPL-MCNC: 182 MG/DL
COLOR UR AUTO: ABNORMAL
CREAT SERPL-MCNC: 0.99 MG/DL (ref 0.67–1.17)
EGFRCR SERPLBLD CKD-EPI 2021: 82 ML/MIN/1.73M2
EOSINOPHIL # BLD AUTO: 0.2 10E3/UL (ref 0–0.7)
EOSINOPHIL NFR BLD AUTO: 5 %
ERYTHROCYTE [DISTWIDTH] IN BLOOD BY AUTOMATED COUNT: 13.6 % (ref 10–15)
FASTING STATUS PATIENT QL REPORTED: ABNORMAL
FASTING STATUS PATIENT QL REPORTED: NORMAL
GLUCOSE SERPL-MCNC: 107 MG/DL (ref 70–99)
GLUCOSE UR STRIP-MCNC: NEGATIVE MG/DL
HCT VFR BLD AUTO: 43.3 % (ref 40–53)
HCV AB SERPL QL IA: NONREACTIVE
HDLC SERPL-MCNC: 91 MG/DL
HGB BLD-MCNC: 14.9 G/DL (ref 13.3–17.7)
HGB UR QL STRIP: NEGATIVE
HIV 1+2 AB+HIV1 P24 AG SERPL QL IA: NONREACTIVE
HOLD SPECIMEN: NORMAL
IMM GRANULOCYTES # BLD: 0 10E3/UL
IMM GRANULOCYTES NFR BLD: 0 %
KETONES UR STRIP-MCNC: NEGATIVE MG/DL
LDLC SERPL CALC-MCNC: 82 MG/DL
LEUKOCYTE ESTERASE UR QL STRIP: NEGATIVE
LYMPHOCYTES # BLD AUTO: 1.2 10E3/UL (ref 0.8–5.3)
LYMPHOCYTES NFR BLD AUTO: 26 %
MCH RBC QN AUTO: 30.2 PG (ref 26.5–33)
MCHC RBC AUTO-ENTMCNC: 34.4 G/DL (ref 31.5–36.5)
MCV RBC AUTO: 88 FL (ref 78–100)
MONOCYTES # BLD AUTO: 0.4 10E3/UL (ref 0–1.3)
MONOCYTES NFR BLD AUTO: 10 %
MUCOUS THREADS #/AREA URNS LPF: PRESENT /LPF
NEUTROPHILS # BLD AUTO: 2.6 10E3/UL (ref 1.6–8.3)
NEUTROPHILS NFR BLD AUTO: 58 %
NITRATE UR QL: NEGATIVE
NONHDLC SERPL-MCNC: 91 MG/DL
NRBC # BLD AUTO: 0 10E3/UL
NRBC BLD AUTO-RTO: 0 /100
PH UR STRIP: 5 [PH] (ref 5–7)
PHOSPHATE SERPL-MCNC: 3.1 MG/DL (ref 2.5–4.5)
PLATELET # BLD AUTO: 260 10E3/UL (ref 150–450)
PSA SERPL DL<=0.01 NG/ML-MCNC: 4.63 NG/ML (ref 0–4.5)
RBC # BLD AUTO: 4.94 10E6/UL (ref 4.4–5.9)
RBC URINE: 3 /HPF
SP GR UR STRIP: 1.02 (ref 1–1.03)
TOTAL PROTEIN SERUM FOR ELP: 6.6 G/DL (ref 6.4–8.3)
TRIGL SERPL-MCNC: 44 MG/DL
TSH SERPL DL<=0.005 MIU/L-ACNC: 2.36 UIU/ML (ref 0.3–4.2)
UROBILINOGEN UR STRIP-MCNC: NORMAL MG/DL
WBC # BLD AUTO: 4.5 10E3/UL (ref 4–11)
WBC URINE: 1 /HPF

## 2024-03-19 PROCEDURE — 82306 VITAMIN D 25 HYDROXY: CPT | Performed by: INTERNAL MEDICINE

## 2024-03-19 PROCEDURE — 82947 ASSAY GLUCOSE BLOOD QUANT: CPT | Performed by: PATHOLOGY

## 2024-03-19 PROCEDURE — 99387 INIT PM E/M NEW PAT 65+ YRS: CPT | Performed by: INTERNAL MEDICINE

## 2024-03-19 PROCEDURE — 84165 PROTEIN E-PHORESIS SERUM: CPT | Mod: TC | Performed by: STUDENT IN AN ORGANIZED HEALTH CARE EDUCATION/TRAINING PROGRAM

## 2024-03-19 PROCEDURE — 93000 ELECTROCARDIOGRAM COMPLETE: CPT | Performed by: INTERNAL MEDICINE

## 2024-03-19 PROCEDURE — 84403 ASSAY OF TOTAL TESTOSTERONE: CPT | Performed by: STUDENT IN AN ORGANIZED HEALTH CARE EDUCATION/TRAINING PROGRAM

## 2024-03-19 PROCEDURE — 92004 COMPRE OPH EXAM NEW PT 1/>: CPT | Performed by: OPHTHALMOLOGY

## 2024-03-19 PROCEDURE — G0103 PSA SCREENING: HCPCS | Mod: GA | Performed by: PATHOLOGY

## 2024-03-19 PROCEDURE — 84100 ASSAY OF PHOSPHORUS: CPT | Performed by: INTERNAL MEDICINE

## 2024-03-19 PROCEDURE — 99207 PR NO CHARGE LOS: CPT

## 2024-03-19 PROCEDURE — 99204 OFFICE O/P NEW MOD 45 MIN: CPT | Performed by: STUDENT IN AN ORGANIZED HEALTH CARE EDUCATION/TRAINING PROGRAM

## 2024-03-19 PROCEDURE — 84443 ASSAY THYROID STIM HORMONE: CPT | Performed by: PATHOLOGY

## 2024-03-19 PROCEDURE — 84460 ALANINE AMINO (ALT) (SGPT): CPT | Performed by: PATHOLOGY

## 2024-03-19 PROCEDURE — 94060 EVALUATION OF WHEEZING: CPT | Performed by: INTERNAL MEDICINE

## 2024-03-19 PROCEDURE — 82565 ASSAY OF CREATININE: CPT | Performed by: PATHOLOGY

## 2024-03-19 PROCEDURE — 96999 UNLISTED SPEC DERM SVC/PX: CPT | Performed by: INTERNAL MEDICINE

## 2024-03-19 PROCEDURE — 84165 PROTEIN E-PHORESIS SERUM: CPT | Mod: 26 | Performed by: STUDENT IN AN ORGANIZED HEALTH CARE EDUCATION/TRAINING PROGRAM

## 2024-03-19 PROCEDURE — 86803 HEPATITIS C AB TEST: CPT | Performed by: INTERNAL MEDICINE

## 2024-03-19 PROCEDURE — 84155 ASSAY OF PROTEIN SERUM: CPT | Performed by: INTERNAL MEDICINE

## 2024-03-19 PROCEDURE — 80061 LIPID PANEL: CPT | Performed by: PATHOLOGY

## 2024-03-19 PROCEDURE — 84075 ASSAY ALKALINE PHOSPHATASE: CPT | Performed by: PATHOLOGY

## 2024-03-19 PROCEDURE — 81001 URINALYSIS AUTO W/SCOPE: CPT | Performed by: PATHOLOGY

## 2024-03-19 PROCEDURE — 87389 HIV-1 AG W/HIV-1&-2 AB AG IA: CPT | Performed by: INTERNAL MEDICINE

## 2024-03-19 PROCEDURE — 36415 COLL VENOUS BLD VENIPUNCTURE: CPT | Performed by: PATHOLOGY

## 2024-03-19 PROCEDURE — 92553 AUDIOMETRY AIR & BONE: CPT | Performed by: AUDIOLOGIST

## 2024-03-19 PROCEDURE — 77080 DXA BONE DENSITY AXIAL: CPT | Mod: GA | Performed by: INTERNAL MEDICINE

## 2024-03-19 PROCEDURE — 92015 DETERMINE REFRACTIVE STATE: CPT | Performed by: OPHTHALMOLOGY

## 2024-03-19 PROCEDURE — 82310 ASSAY OF CALCIUM: CPT | Performed by: INTERNAL MEDICINE

## 2024-03-19 PROCEDURE — 85025 COMPLETE CBC W/AUTO DIFF WBC: CPT | Performed by: PATHOLOGY

## 2024-03-19 PROCEDURE — 99000 SPECIMEN HANDLING OFFICE-LAB: CPT | Performed by: PATHOLOGY

## 2024-03-19 RX ORDER — TADALAFIL 10 MG/1
10 TABLET ORAL DAILY
Qty: 90 TABLET | Refills: 0 | Status: SHIPPED | OUTPATIENT
Start: 2024-03-19 | End: 2024-07-10

## 2024-03-19 ASSESSMENT — TONOMETRY
OS_IOP_MMHG: 20
OD_IOP_MMHG: 19
IOP_METHOD: ICARE

## 2024-03-19 ASSESSMENT — REFRACTION_MANIFEST
OS_AXIS: 165
OS_ADD: +1.50
OD_AXIS: 030
OD_CYLINDER: +0.25
OS_CYLINDER: +0.75
OD_SPHERE: +0.50
OD_ADD: +1.50
OS_SPHERE: +0.25

## 2024-03-19 ASSESSMENT — CONF VISUAL FIELD
OD_INFERIOR_TEMPORAL_RESTRICTION: 0
OS_SUPERIOR_NASAL_RESTRICTION: 0
OS_INFERIOR_TEMPORAL_RESTRICTION: 0
OS_SUPERIOR_TEMPORAL_RESTRICTION: 0
OS_INFERIOR_NASAL_RESTRICTION: 0
OD_NORMAL: 1
OS_NORMAL: 1
OD_SUPERIOR_NASAL_RESTRICTION: 0
OD_SUPERIOR_TEMPORAL_RESTRICTION: 0
OD_INFERIOR_NASAL_RESTRICTION: 0

## 2024-03-19 ASSESSMENT — CUP TO DISC RATIO
OD_RATIO: 0.2
OS_RATIO: 0.2

## 2024-03-19 ASSESSMENT — VISUAL ACUITY
METHOD: SNELLEN - LINEAR
OD_SC: 20/25
OS_SC: 20/25
OD_SC+: -1
OS_SC+: -1

## 2024-03-19 ASSESSMENT — SLIT LAMP EXAM - LIDS
COMMENTS: HEAVY DERMATOCHALASIS RESTING ON LASHES
COMMENTS: HEAVY DERMATOCHALASIS RESTING ON LASHES

## 2024-03-19 ASSESSMENT — PAIN SCALES - GENERAL: PAINLEVEL: MILD PAIN (2)

## 2024-03-19 NOTE — LETTER
3/19/2024       RE: Ramin Murcia  [address] [facility] [address]     Dear Colleague,    Thank you for referring your patient, Ramin Murcia, to the [facility] [place] at [facility]. Please see a copy of my visit note below.    Dermatology Rooming Note    Ramin Murcia's goals for this visit include:   Chief Complaint   Patient presents with    Derm Problem     ZEFERINO Abernathy, EMT-B      [facility] Dermatology Note    Encounter Date: Mar 19, 2024    Dermatology Problem List:  #Hx MM L chest 2015; BD 0.6 mm s/p WLE      Major PMHx  -   ______________________________________    Impression/Plan:  Ramin was seen today for derm problem.    Diagnoses and all orders for this visit:    Multiple benign nevi  - Reviewed the compounding benefits of incremental changes to sun protective clothing behaviors including increased frequency of sunscreen and sun protective clothing like broad brimmed hats and longsleeved UPF containing clothing    Seborrheic keratoses  - benign    Dermatitis, seborrheic  -Uses ketoconazole shampoo regularly, has trialed a topical steroid in the past has not helped significantly  - Not interested in further treatment, symptoms are mild    History of malignant melanoma of skin  -Left chest 2015  - No evidence of recurrence  - No palpable lymph nodes in the cervical, axillary, inguinal beds    Hx of squamous cell carcinoma of skin  - L thigh  - No obvious evidence of recurrence at site of previous malignancy    Tinea pedis of left foot  -Pustules on the border, currently using over-the-counter topicals  - Offered prescription topicals which she declines, noted that sometimes when they are pustules it indicates involvement of the hair follicle, and creams may not penetrate fully, discussed that if he does not experience improvement with the over-the-counter topical he can reach  out to me for some oral antifungals or prescription topicals            Follow-up in 1 year .       Staff Involved:  Staff Only    Edwar Barreto MD   of Dermatology  Department of Dermatology  [facility]      CC:   Chief Complaint   Patient presents with    Derm Problem     FBSE       History of Present Illness:  Mr. Ramin Murcia is a 68 year old male who presents as a new patient.    Patient presents today for examination of spots on his trunk and extremities.  He has a history of melanoma taken of the left chest in 2015 as well as squamous cell on his left thigh.  He is also managing some seborrheic dermatitis with prescription antifungal shampoo, as well as some athlete's foot on his left foot which she is using over-the-counter topical antifungals with.    He is a triathlete, he wears sunscreen regularly when he is outdoors, but thinks that he can do a better job of wearing hats and sun protective clothing    Labs:      Physical exam:  Vitals: There were no vitals taken for this visit.  GEN: well developed, well-nourished, in no acute distress, in a pleasant mood.     SKIN: Galaviz phototype 1  - Full skin, which includes the head/face, both arms, chest, back, abdomen,both legs, genitalia and/or groin buttocks, digits and/or nails, was examined.  - scattered brown papules on trunk and extremities   - Stuck on brown papules on trunk and extremities   - No obvious evidence of recurrence at site of previous malignancy  - no detectable cervical, axillary, or inguinal LAD  -Annular erythematous rash left foot started with pustules on the border  - No other lesions of concern on areas examined.     Past Medical History:   History reviewed. No pertinent past medical history.  Past Surgical History:   Procedure Laterality Date    LASIK         Social History:   reports that he has never smoked. He has never used smokeless tobacco.    Family History:  Family  History   Problem Relation Age of Onset    Skin Cancer Mother     Melanoma Mother     Skin Cancer Father     Melanoma Father     Glaucoma No family hx of     Macular Degeneration No family hx of        Medications:  Current Outpatient Medications   Medication Sig Dispense Refill    ciclopirox 1 % SHAM Apply 1 Application topically      fluocinonide (LIDEX) 0.05 % external solution Apply 1 Application topically      tadalafil (CIALIS) 5 MG tablet Take 1 tablet by mouth daily       Allergies   Allergen Reactions    Finasteride Dizziness

## 2024-03-19 NOTE — NURSING NOTE
Dermatology Rooming Note    Ramin Murcia's goals for this visit include:   Chief Complaint   Patient presents with    Derm Problem     ZEFERINO Abernathy, EMT-B

## 2024-03-19 NOTE — LETTER
3/19/2024       RE: Ramin Murcia  [address] [facility] [address]     Dear Colleague,    Thank you for referring your patient, Ramin Murcia, to the [facility] at [facility]. Please see a copy of my visit note below.    History and Physical Examination     SUBJECTIVE: Chief concern: preventive health review.     Past Medical History:  1.  Impaired fasting glucose  2.  Obstructive sleep apnea  3.  History of adenomatous colonic polyps  4.  Low bone density  5.  History of spinal stenosis, status post L3-5 decompression laminectomy, 7/2017  6.  History of depression, anxiety, and alcohol misuse  7.  Family history of adverse reaction to anesthesia  8.  History of mild aortic regurgitation  9.  History of dilated ascending aorta  10.  Bilateral sensorineural hearing loss  11.  History of malignant melanoma, status post excision from anterior chest, circa 2015  12.  History of basal carcinoma, left leg and right abdomen, status post excisions  13.  History of BPH and elevated PSA, 8/2022 with favorable prostate health index; 1-year follow-up recommended  14.  Mild carotid artery stenosis     Adverse Drug Reactions: Finasteride (disequilibrium)     Current Medications:  Tadalafil, 10 mg daily, used for BPH  Ciclopirox 1% shampoo, used as needed  Fluocinonide 0.05% solution, as needed     Habits:  Tobacco: Never  Alcohol: 2-3 servings, 5 days/week  Caffeine: 2 servings of coffee per day  Cannabis/Street drugs: None     Social History:  to [place] since 1982; father of twin children: son Renato, age 30, a  in the [employer]; and daughter Carey, also age 30, an  who works in [employer].  Ramin is a native of [place] who completed his undergraduate studies at [employer] before completing a PhD in economics at the [employer].  His  career has included positions at the [employer], [employer], and [employer].  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  He enjoys travel, training for and participating in triathlons, and hopes to spend time with the grandchild his daughter is expecting.  He exercises for 60 minutes, 6 days/week, typically alternating among rowing, swimming, strength training, and spinning on a stationary bike.     Family History: Father  at age 86, with history of obstructive sleep apnea, heart failure, depression, and migraines.  Mother  at age 84 from pancreatic cancer, with history of rheumatic valvular heart disease and adverse reaction to anesthesia.  A brother 1 year his bran has hypertension, obstructive sleep apnea, and history of neutropenia.  A brother 3 years his bran has a history of hypertension.  Brother 5 years his bran has a history of hypertension.  Son was incidentally noted to have aortic valvular heart disease (details unavailable), for which he underwent aortic valve repair or replacement.  Daughter is in good health.  Maternal grandmother  at age 65 from cancer of the pancreas or kidney.  Maternal grandfather  at age 40 from myocardial infarction.  Paternal grandmother  at advanced age.  Paternal paternal grandfather  at advanced age.     Review of Systems: Recent 10-pound weight gain which Ramin attributes to increased alcohol use and diminished attention to diet.  Nocturia x 1-2, improved from 3-4 prior to tadalafil treatment.  Chronic diminished urinary stream with sense of incomplete emptying.  Infrequent self-limited lower back discomfort, typically after running.  Colonoscopy was completed in , at which time 5-year follow-up was recommended.  COVID (Pfizer) vaccinations were administered on , , and 2021; 2022;  "and 10/19/2023.  Hepatitis A vaccination series was completed in 11/1998.  PPSV23 was administered in 11/2020.  IPV was administered in 10/2010.  Live inactivated zoster vaccination was administered in 4/2015.  Recombinant zoster vaccination x 1 was administered in 11/2020.  Tetanus (Td) was administered in 11/2020; Tdap was administered in 7/2008.  Oral typhoid vaccination was administered in 11/2010.  Remainder of complete review of systems was negative.     OBJECTIVE:     Vital signs: Height 74 inches.  Weight 200 pounds.  Blood pressure 128/80 on average of 3 automated readings.  Heart rate 71.  Respiratory rate 18.  Temperature 97.7 degrees.  O2 saturation 95% on room air.  General: Alert, neatly dressed and groomed, in no acute distress.  HEENT: Atraumatic and normocephalic. Eyelids, pupils, and conjunctivae appeared normal. Lips, teeth and gums appear normal.  Oropharynx showed moist mucous membranes, without exudate or erythema.  \"Crowded\" soft palate with relatively narrow airway.  Neck: Supple, without thyromegaly, mass, or bruit. No cervical or supraclavicular lymphadenopathy.  Back: No spinal or costovertebral angle tenderness.  Chest: Clear to auscultation and percussion. Normal respiratory effort.  Cardiovascular: No jugular venous distention. Regular rate and rhythm, normal S1, S2 without murmur.  Abdomen: Bowel sounds positive; soft, nontender, without rebound, guarding, hepatosplenomegaly or mass.  Extremities: No cyanosis or edema.  Genitalia: Normal male genitalia, without scrotal mass or hernia. No inguinal lymphadenopathy.  Rectal: Normal tone, with smooth, nontender, moderately enlarged prostate. No rectal mass.  Skin: Examination was deferred; full evaluation was completed earlier in the day through dermatology clinic.  Neurologic: Cranial nerves II-XII were grossly intact. Sensory and motor examinations were normal. Normal gait.  Mini-cog score was 4/5; 5/5 with minimal " prompting.  Psychiatric: Alert and oriented ×3. Normal affect. Judgment and insight intact.  PHQ-2 score was 0.  YONY-7 score was 1.    Creatinine 0.99, alkaline phosphatase 47, ALT 18, cholesterol 182, triglycerides 44, HDL 91, LDL 82, cholesterol/HDL 2.0, glucose 107, PSA 4.63, TSH 2.36, white blood cell count 4500, hemoglobin 14.9, platelets 260,000, HIV and hepatitis C screens nonreactive, urinalysis notable for 3 RBC/hpf.  Additional tests arranged to evaluate low bone density included normal levels of calcium (9.6) and phosphorus (3.1); testosterone and SPEP results pending.    EKG was notable for sinus rhythm with premature supraventricular complexes; minimal voltage criteria for LVH, may be normal variant.  Spirometry showed an FEV1 of 3.57, with an FVC of 5.75; readings were 101% and 124% of predicted values; FEV1/FVC was 62% (predicted was 76%).    An audiology evaluation was notable for bilateral mild sloping to severe rising to moderately-to severe mixed hearing loss on the right with normal sloping to severe mixed hearing loss with 25 dB asymmetry at 8000 Hz in the left ear.  Otoscopy was notable for findings suggestive of osteoma in both ears; ENT consultation was recommended.    DEXA showed low bone density, with most negative and valid T-score -2.4 at the level of the left femoral neck.  Body composition analysis showed 25.6% fat (50th percentile); body mass index was 25.7.       ASSESSMENT:    1.  Elevated PSA.  Urology consultation was recommended, with plan to arrange repeat PSA measurement prior to the appointment.    2.  Microscopic hematuria.  Ramin was advised to submit 2 additional urine samples for analysis, at least 1 week.  Further evaluation will be recommended if 3/3 specimens show evidence of blood.    3.  Hearing loss with apparent osteoma.  ENT consultation was recommended.    4.  Abnormal spirometry.  No symptoms to suggest significant asthma.  I recommend formal pulmonary function  test with methacholine challenge to verify the spirometry suggestion of obstructive lung disease.    5.  History of aortic regurgitation and dilated ascending aorta.  Echocardiogram was recommended, at which time the question of LVH on EKG will be evaluated.    6.  Family history of pancreatic cancer.  In the setting of a first-degree relative and possible second-degree relative with pancreatic cancer, I recommend that he consider genetic counseling to discuss genomic or genetic testing.    7.  Overweight.  We reviewed diet and exercise strategies that can facilitate reduction of body fat and weight.  I recommend that he reduce his consumption of alcohol to current guidelines of no more than 1 (occasionally 2) servings per day.  We reviewed the potential benefit of 16/8 intermittent fasting.    8.  Low bone density.  We discussed the nature of this finding, along with the importance of regular weight-bearing exercise; supplemental vitamin D3, 50 mcg daily; and daily calcium intake of 1200 mg, preferably from dietary sources.  Additional tests were arranged to screen for secondary causes, with results pending.  He was advised to submit a 24-hour urine specimen for analysis of calcium excretion.  Further recommendations will be based on these results.    9.  Preventive care.  As noted above, he was advised to reduce consumption of alcohol.  I recommended recombinant zoster vaccination x 1 and will recommend consideration of RSV vaccination; he prefers to arrange vaccinations on his own.  Using AHA guidelines, his estimated 10-year risks are as follows: CVD 9.9%; ASCVD 5.0%; heart failure 6.3%.  I recommend that he discuss with his primary physician the possibility of using low-strength (81 mg) aspirin on a daily basis until age 69.  I recommend treatment with a moderate-intensity statin, which he will consider; a summary of a 2016 Lancet article by Brody Lora reviewing risks and benefits of statin treatment was  provided.  Genetic counseling was recommended, as noted above.  Colonoscopy will be due in 2027.  PCV 20 will be due in 11/2025.     PLAN: See above.     ~SRT      Again, thank you for allowing me to participate in the care of your patient.      Sincerely,    Gerard Guo MD

## 2024-03-19 NOTE — PROGRESS NOTES
Chief Complaint(s) and History of Present Illness(es)     COMPREHENSIVE EYE EXAM            Laterality: both eyes    Associated symptoms: redness (no concern, states eyes are red often   denies Seasonal allergies) and floaters (longstanding, no changes in years   per pt.).  Negative for dryness, eye pain, tearing and flashes    Treatments tried: no treatments    Pain scale: 0/10          Comments    Pt notes he is doing well today, here for routine check, no concerns   today, s/p LASIK OU 20+ years ago, uses OTC reading gls for NVA +1.50    Erlinda Stake COT March 19, 2024 7:46 AM    POHx: laser in situ keratomileusis (LASIK) as above and an episode of Herpes zoster ophthalmicus years ago and was told had retinal involvement in the right eye. No issues since     Assessment & Plan     Ramin Murcia is a 68 year old male with the following diagnoses:   Encounter Diagnoses   Name Primary?    Nuclear senile cataract of both eyes Yes    Posterior vitreous detachment of both eyes     Presbyopia      Over the counter readers  Follow-up one year          Patient disposition:   No follow-ups on file.        Attending Physician Attestation: Complete documentation of historical and exam elements from today's encounter can be found in the full encounter summary report (not reduplicated in this progress note). I personally obtained the chief complaint(s) and history of present illness. I confirmed and edited as necessary the review of systems, past medical/surgical history, family history, social history, and examination findings as documented by others; and I examined the patient myself. I personally reviewed the relevant tests, images, and reports as documented above. I formulated and edited as necessary the assessment and plan and discussed the findings and management plan with the patient.  -Keila Maharaj MD       Ganesh Jones(Attending)

## 2024-03-19 NOTE — NURSING NOTE
Chief Complaint   Patient presents with    Physical     Patient is here for annual physical     Dannielle Tidwell CMA 8:30 AM on 3/19/2024

## 2024-03-19 NOTE — NURSING NOTE
AHA BP    1st    132/78  2nd   126/80  3rd    126/83    Average   128/80  Dannielle Tidwell CMA 9:40 AM on 3/19/2024

## 2024-03-19 NOTE — Clinical Note
Hi Dr. Guo,  I saw Ramin for a hearing evaluation and found mixed hearing loss in both ears. 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 I think it is possible that the osteoma in the right ear may be affecting his hearing. I don't have a previous audiogram to compare today's test to, but I recommend that he follow-up with an ENT re: the abnormal otoscopy and mixed hearing loss. I would appreciate it if you could please explain this to the patient. Please don't hesitate to reach out to me with any questions.  Thank you,  Giovani Fountain, CCC-A Audiologist

## 2024-03-19 NOTE — PROGRESS NOTES
Ramin MARIETTA Razoe comes into clinic today at the request of Dr. DALIA Guo Ordering Provider for EKG.    This service provided today was under the supervising provider of the day Dr. DALIA Guo, who was available if needed.    Dannielle Santos, [facility]

## 2024-03-19 NOTE — PROGRESS NOTES
[facility] Dermatology Note    Encounter Date: Mar 19, 2024    Dermatology Problem List:  #Hx MM L chest 2015; BD 0.6 mm s/p WLE      Major PMHx  -   ______________________________________    Impression/Plan:  Ramin was seen today for derm problem.    Diagnoses and all orders for this visit:    Multiple benign nevi  - Reviewed the compounding benefits of incremental changes to sun protective clothing behaviors including increased frequency of sunscreen and sun protective clothing like broad brimmed hats and longsleeved UPF containing clothing    Seborrheic keratoses  - benign    Dermatitis, seborrheic  -Uses ketoconazole shampoo regularly, has trialed a topical steroid in the past has not helped significantly  - Not interested in further treatment, symptoms are mild    History of malignant melanoma of skin  -Left chest 2015  - No evidence of recurrence  - No palpable lymph nodes in the cervical, axillary, inguinal beds    Hx of squamous cell carcinoma of skin  - L thigh  - No obvious evidence of recurrence at site of previous malignancy    Tinea pedis of left foot  -Pustules on the border, currently using over-the-counter topicals  - Offered prescription topicals which she declines, noted that sometimes when they are pustules it indicates involvement of the hair follicle, and creams may not penetrate fully, discussed that if he does not experience improvement with the over-the-counter topical he can reach out to me for some oral antifungals or prescription topicals            Follow-up in 1 year .       Staff Involved:  Staff Only    Edwar Barreto MD   of Dermatology  Department of Dermatology  [facility]      CC:   Chief Complaint   Patient presents with    Derm Problem     FBSE       History of Present Illness:  Mr. Ramin Murcia is a 68 year old male who presents as a new patient.    Patient presents today for examination of spots on his trunk  and extremities.  He has a history of melanoma taken of the left chest in 2015 as well as squamous cell on his left thigh.  He is also managing some seborrheic dermatitis with prescription antifungal shampoo, as well as some athlete's foot on his left foot which she is using over-the-counter topical antifungals with.    He is a triathlete, he wears sunscreen regularly when he is outdoors, but thinks that he can do a better job of wearing hats and sun protective clothing    Labs:      Physical exam:  Vitals: There were no vitals taken for this visit.  GEN: well developed, well-nourished, in no acute distress, in a pleasant mood.     SKIN: Galaviz phototype 1  - Full skin, which includes the head/face, both arms, chest, back, abdomen,both legs, genitalia and/or groin buttocks, digits and/or nails, was examined.  - scattered brown papules on trunk and extremities   - Stuck on brown papules on trunk and extremities   - No obvious evidence of recurrence at site of previous malignancy  - no detectable cervical, axillary, or inguinal LAD  -Annular erythematous rash left foot started with pustules on the border  - No other lesions of concern on areas examined.     Past Medical History:   History reviewed. No pertinent past medical history.  Past Surgical History:   Procedure Laterality Date    LASIK         Social History:   reports that he has never smoked. He has never used smokeless tobacco.    Family History:  Family History   Problem Relation Age of Onset    Skin Cancer Mother     Melanoma Mother     Skin Cancer Father     Melanoma Father     Glaucoma No family hx of     Macular Degeneration No family hx of        Medications:  Current Outpatient Medications   Medication Sig Dispense Refill    ciclopirox 1 % SHAM Apply 1 Application topically      fluocinonide (LIDEX) 0.05 % external solution Apply 1 Application topically      tadalafil (CIALIS) 5 MG tablet Take 1 tablet by mouth daily       Allergies   Allergen  Reactions    Finasteride Dizziness

## 2024-03-19 NOTE — PROGRESS NOTES
AUDIOLOGY REPORT- [facility]    SUMMARY: Audiology visit completed. See audiogram for results.      RECOMMENDATIONS: Follow-up with ENT regarding the abnormal otoscopy and mixed hearing loss. Return if changes are noted.    Ruperto Fountain, CCC-A  Clinical Audiologist  [place] #[id number]     CC: Gerard Guo MD

## 2024-03-19 NOTE — NURSING NOTE
Chief Complaints and History of Present Illnesses   Patient presents with    COMPREHENSIVE EYE EXAM     Chief Complaint(s) and History of Present Illness(es)       COMPREHENSIVE EYE EXAM              Laterality: both eyes    Associated symptoms: redness (no concern, states eyes are red often denies Seasonal allergies) and floaters (longstanding, no changes in years per pt.).  Negative for dryness, eye pain, tearing and flashes    Treatments tried: no treatments    Pain scale: 0/10              Comments    Pt notes he is doing well today, here for routine check, no concerns today, s/p LASIK OU 20+ years ago, uses OTC reading gls for NVA +1.50    Erlinda Stake COT March 19, 2024 7:46 AM

## 2024-03-20 LAB
ATRIAL RATE - MUSE: 67 BPM
DIASTOLIC BLOOD PRESSURE - MUSE: NORMAL MMHG
INTERPRETATION ECG - MUSE: NORMAL
P AXIS - MUSE: 73 DEGREES
PR INTERVAL - MUSE: 178 MS
QRS DURATION - MUSE: 100 MS
QT - MUSE: 414 MS
QTC - MUSE: 437 MS
R AXIS - MUSE: 54 DEGREES
SYSTOLIC BLOOD PRESSURE - MUSE: NORMAL MMHG
T AXIS - MUSE: 47 DEGREES
VENTRICULAR RATE- MUSE: 67 BPM
VIT D+METAB SERPL-MCNC: 31 NG/ML (ref 20–50)

## 2024-03-21 ENCOUNTER — VIRTUAL VISIT (OUTPATIENT)
Dept: PSYCHOLOGY | Facility: CLINIC | Age: 69
End: 2024-03-21
Payer: COMMERCIAL

## 2024-03-21 ENCOUNTER — PATIENT OUTREACH (OUTPATIENT)
Dept: GASTROENTEROLOGY | Facility: CLINIC | Age: 69
End: 2024-03-21
Payer: COMMERCIAL

## 2024-03-21 ENCOUNTER — OFFICE VISIT (OUTPATIENT)
Dept: PSYCHOLOGY | Facility: CLINIC | Age: 69
End: 2024-03-21
Payer: COMMERCIAL

## 2024-03-21 DIAGNOSIS — F91.8 CONDUCT DISORDER, UNDIFFERENTIATED TYPE: Primary | ICD-10-CM

## 2024-03-21 DIAGNOSIS — F41.9 ANXIETY DISORDER, UNSPECIFIED TYPE: ICD-10-CM

## 2024-03-21 DIAGNOSIS — Z78.9 ALCOHOL USE: ICD-10-CM

## 2024-03-21 LAB
ALBUMIN SERPL ELPH-MCNC: 4.2 G/DL (ref 3.7–5.1)
ALPHA1 GLOB SERPL ELPH-MCNC: 0.3 G/DL (ref 0.2–0.4)
ALPHA2 GLOB SERPL ELPH-MCNC: 0.5 G/DL (ref 0.5–0.9)
B-GLOBULIN SERPL ELPH-MCNC: 0.7 G/DL (ref 0.6–1)
EXPTIME-PRE: 9.76 SEC
FEF2575-%PRED-POST: 76 %
FEF2575-%PRED-PRE: 80 %
FEF2575-POST: 2 L/SEC
FEF2575-PRE: 2.11 L/SEC
FEF2575-PRED: 2.63 L/SEC
FEFMAX-%PRED-PRE: 89 %
FEFMAX-PRE: 8.57 L/SEC
FEFMAX-PRED: 9.56 L/SEC
FEV1-%PRED-PRE: 101 %
FEV1-PRE: 3.57 L
FEV1FEV6-PRE: 65 %
FEV1FEV6-PRED: 78 %
FEV1FVC-PRE: 62 %
FEV1FVC-PRED: 76 %
FIFMAX-PRE: 7.9 L/SEC
FVC-%PRED-PRE: 124 %
FVC-PRE: 5.75 L
FVC-PRED: 4.64 L
GAMMA GLOB SERPL ELPH-MCNC: 0.9 G/DL (ref 0.7–1.6)
M PROTEIN SERPL ELPH-MCNC: 0 G/DL
PROT PATTERN SERPL ELPH-IMP: NORMAL
TESTOST SERPL-MCNC: 570 NG/DL (ref 240–950)

## 2024-03-21 PROCEDURE — 90853 GROUP PSYCHOTHERAPY: CPT | Mod: 95 | Performed by: STUDENT IN AN ORGANIZED HEALTH CARE EDUCATION/TRAINING PROGRAM

## 2024-03-21 PROCEDURE — 90847 FAMILY PSYTX W/PT 50 MIN: CPT | Mod: 59 | Performed by: STUDENT IN AN ORGANIZED HEALTH CARE EDUCATION/TRAINING PROGRAM

## 2024-03-21 NOTE — PROGRESS NOTES
[facility] - Progress Note    Date of Service: 3/21/24   Name: Ramin Murcia  : 1955  Medical Record Number: [id number]  Treating Provider: Mason Adkins, PhD  Type of Session: Couple  Present in Session: Elizabeth Faustin, therapist  Session Start and Stop Time: 10:00am-10:55am  Number of Minutes:  55    SERVICE MODALITY:  In-person    DSM-5 Diagnoses:  Encounter Diagnoses   Name Primary?     Unspecified disruptive, impulse control, and conduct disorder (hypersexuality) Yes     Anxiety disorder, unspecified type      Alcohol use          Current Reported Symptoms and Status update:  Changes since last session includes no boundary violations, recognizing alcohol use patterns that are affecting mood, some anxiety. Working on relationship repair with Elizabeth.    Patient's problems with out of control, driven, impulsive, compulsive sexual behavior began in early . He has been in a stable loving marriage for 40 years then began having covert hook-ups with young men.  Efforts to change problematic behaviors have included several years of psychotherapy and attenance in [facility]. He continues to struggle with recurrent and intense urges which cause him distress.     Client reports experiencing some anxiety/worry and associated distress. He attributes distress to ineffective coping, patterns of avoidance/numbing, and difficulties with functioning in relationships.      Progress Toward Treatment Goals:   Satisfactory progress     Therapeutic Interventions/Treatment Strategies:    Area(s) of treatment focus addressed in this session included Symptom Management, Interpersonal Relationship Skills and Sexual Health and Wellness    Ramin and Elizabeth processed recent conversations about Ramin's sexual interests and drive. Both recognized differences in sexual arousal. Elizabeth shared history of feeling pain/hurt about not feeling desired by Ramin compounded by compulsive sexual behavior concerns.  Therapist supported Ramin to connect with Elizabeth's feelings. Therapist shared recommendations for Ramin to address alcohol use patterns and Elizabeth to seek individual therapy to address past/current hurt in the relationship. We discussed having another couples session to review status on recommendations and then pausing relationship therapy afterwards to focus on individual treatment. Both agreed to these plans. Therapist will share referrals with Elizabeth.    Psychotherapist offered support, feedback and validation and reinforced use of skills Treatment modalities used include Cognitive Behavioral Therapy Interpersonal  Support and Feedback    Patient Response:   Patient responded to session by accepting feedback, giving feedback, listening, focusing on goals and accepting support  Possible barriers to participation / learning include: N/A    Current Mental Status Exam:   Appearance:  Appropriate   Eye Contact:  Good   Attitude / Demeanor: Cooperative   Speech      Rate / Production: Normal/ Responsive      Volume:  Normal  volume  Orientation:  All  Mood:   Normal  Affect:   Appropriate   Thought Content: Clear   Insight:   Good       Plan/Need for Future Services:  Return for therapy in 2 weeks to treat diagnosed problems.    Patient has a current master individualized treatment plan.  See Epic treatment plan for more information.    Referral / Collaboration:  Referral to another professional/service is not indicated at this time..  Emergency Services Needed?  No    Assignment:  Consider treatment needs for alcohol use    Interactive Complexity:  There are four specific communication difficulties that complicate the work of the primary psychiatric procedure.  Interactive complexity (+[id number]) may be reported when at least one of these difficulties is present.    Communication difficulties present during current the psychiatric procedure include:  1. None.      Signature/Title:      Mason Adkins, PhD,  RADHA    [facility], [facility]  Department of Family Medicine and Community Health  [facility]

## 2024-03-21 NOTE — PROGRESS NOTES
History and Physical Examination     SUBJECTIVE: Chief concern: preventive health review.     Past Medical History:  1.  Impaired fasting glucose  2.  Obstructive sleep apnea  3.  History of adenomatous colonic polyps  4.  Low bone density  5.  History of spinal stenosis, status post L3-5 decompression laminectomy, 7/2017  6.  History of depression, anxiety, and alcohol misuse  7.  Family history of adverse reaction to anesthesia  8.  History of mild aortic regurgitation  9.  History of dilated ascending aorta  10.  Bilateral sensorineural hearing loss  11.  History of malignant melanoma, status post excision from anterior chest, circa 2015  12.  History of basal carcinoma, left leg and right abdomen, status post excisions  13.  History of BPH and elevated PSA, 8/2022 with favorable prostate health index; 1-year follow-up recommended  14.  Mild carotid artery stenosis     Adverse Drug Reactions: Finasteride (disequilibrium)     Current Medications:  Tadalafil, 10 mg daily, used for BPH  Ciclopirox 1% shampoo, used as needed  Fluocinonide 0.05% solution, as needed     Habits:  Tobacco: Never  Alcohol: 2-3 servings, 5 days/week  Caffeine: 2 servings of coffee per day  Cannabis/Street drugs: None     Social History:  to [employer] since 1982; father of twin children: son Renato, age 30, a  in the [facility]; and daughter Carey, also age 30, an  who works in [employer].  Ramin is a native of [place] and [place] who completed his undergraduate studies at [employer] before completing a PhD in economics at the [employer].  His career has included positions at the [employer], [employer], [employer], and [employer].  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  He enjoys travel, training for and participating in triathlons, and  hopes to spend time with the grandchild his daughter is expecting.  He exercises for 60 minutes, 6 days/week, typically alternating among rowing, swimming, strength training, and spinning on a stationary bike.     Family History: Father  at age 86, with history of obstructive sleep apnea, heart failure, depression, and migraines.  Mother  at age 84 from pancreatic cancer, with history of rheumatic valvular heart disease and adverse reaction to anesthesia.  A brother 1 year his bran has hypertension, obstructive sleep apnea, and history of neutropenia.  A brother 3 years his bran has a history of hypertension.  Brother 5 years his bran has a history of hypertension.  Son was incidentally noted to have aortic valvular heart disease (details unavailable), for which he underwent aortic valve repair or replacement.  Daughter is in good health.  Maternal grandmother  at age 65 from cancer of the pancreas or kidney.  Maternal grandfather  at age 40 from myocardial infarction.  Paternal grandmother  at advanced age.  Paternal paternal grandfather  at advanced age.     Review of Systems: Recent 10-pound weight gain which Ramin attributes to increased alcohol use and diminished attention to diet.  Nocturia x 1-2, improved from 3-4 prior to tadalafil treatment.  Chronic diminished urinary stream with sense of incomplete emptying.  Infrequent self-limited lower back discomfort, typically after running.  Colonoscopy was completed in , at which time 5-year follow-up was recommended.  COVID (Pfizer) vaccinations were administered on , , and 2021; 2022; and 10/19/2023.  Hepatitis A vaccination series was completed in 1998.  PPSV23 was administered in 2020.  IPV was administered in 10/2010.  Live inactivated zoster vaccination was administered in 2015.  Recombinant zoster vaccination x 1 was administered in 2020.  Tetanus (Td) was administered in 2020; Tdap was  "administered in 7/2008.  Oral typhoid vaccination was administered in 11/2010.  Remainder of complete review of systems was negative.     OBJECTIVE:     Vital signs: Height 74 inches.  Weight 200 pounds.  Blood pressure 128/80 on average of 3 automated readings.  Heart rate 71.  Respiratory rate 18.  Temperature 97.7 degrees.  O2 saturation 95% on room air.  General: Alert, neatly dressed and groomed, in no acute distress.  HEENT: Atraumatic and normocephalic. Eyelids, pupils, and conjunctivae appeared normal. Lips, teeth and gums appear normal.  Oropharynx showed moist mucous membranes, without exudate or erythema.  \"Crowded\" soft palate with relatively narrow airway.  Neck: Supple, without thyromegaly, mass, or bruit. No cervical or supraclavicular lymphadenopathy.  Back: No spinal or costovertebral angle tenderness.  Chest: Clear to auscultation and percussion. Normal respiratory effort.  Cardiovascular: No jugular venous distention. Regular rate and rhythm, normal S1, S2 without murmur.  Abdomen: Bowel sounds positive; soft, nontender, without rebound, guarding, hepatosplenomegaly or mass.  Extremities: No cyanosis or edema.  Genitalia: Normal male genitalia, without scrotal mass or hernia. No inguinal lymphadenopathy.  Rectal: Normal tone, with smooth, nontender, moderately enlarged prostate. No rectal mass.  Skin: Examination was deferred; full evaluation was completed earlier in the day through dermatology clinic.  Neurologic: Cranial nerves II-XII were grossly intact. Sensory and motor examinations were normal. Normal gait.  Mini-cog score was 4/5; 5/5 with minimal prompting.  Psychiatric: Alert and oriented ×3. Normal affect. Judgment and insight intact.  PHQ-2 score was 0.  YONY-7 score was 1.    Creatinine 0.99, alkaline phosphatase 47, ALT 18, cholesterol 182, triglycerides 44, HDL 91, LDL 82, cholesterol/HDL 2.0, glucose 107, PSA 4.63, TSH 2.36, white blood cell count 4500, hemoglobin 14.9, platelets " 260,000, HIV and hepatitis C screens nonreactive, urinalysis notable for 3 RBC/hpf.  Additional tests arranged to evaluate low bone density included normal levels of calcium (9.6) and phosphorus (3.1); testosterone and SPEP results pending.    EKG was notable for sinus rhythm with premature supraventricular complexes; minimal voltage criteria for LVH, may be normal variant.  Spirometry showed an FEV1 of 3.57, with an FVC of 5.75; readings were 101% and 124% of predicted values; FEV1/FVC was 62% (predicted was 76%).    An audiology evaluation was notable for bilateral mild sloping to severe rising to moderately-to severe mixed hearing loss on the right with normal sloping to severe mixed hearing loss with 25 dB asymmetry at 8000 Hz in the left ear.  Otoscopy was notable for findings suggestive of osteoma in both ears; ENT consultation was recommended.    DEXA showed low bone density, with most negative and valid T-score -2.4 at the level of the left femoral neck.  Body composition analysis showed 25.6% fat (50th percentile); body mass index was 25.7.       ASSESSMENT:    1.  Elevated PSA.  Urology consultation was recommended, with plan to arrange repeat PSA measurement prior to the appointment.    2.  Microscopic hematuria.  Ramin was advised to submit 2 additional urine samples for analysis, at least 1 week.  Further evaluation will be recommended if 3/3 specimens show evidence of blood.    3.  Hearing loss with apparent osteoma.  ENT consultation was recommended.    4.  Impaired fasting glucose.  Mildly elevated glucose level, in the wake of recent weight gain.  We reviewed the importance of regular exercise, maintenance of ideal weight, and adherence to a healthful diet.  I will recommend measurement of fasting glucose levels at least annually.    5.  History of aortic regurgitation and dilated ascending aorta.  Echocardiogram was recommended, at which time the question of LVH on EKG will be evaluated.    6.   Family history of pancreatic cancer.  In the setting of a first-degree relative and possible second-degree relative with pancreatic cancer, I recommend that he consider genetic counseling to discuss genomic or genetic testing.    7.  Overweight.  We reviewed diet and exercise strategies that can facilitate reduction of body fat and weight.  I recommend that he reduce his consumption of alcohol to current guidelines of no more than 1 (occasionally 2) servings per day.  We reviewed the potential benefit of 16/8 intermittent fasting.    8.  Low bone density.  We discussed the nature of this finding, along with the importance of regular weight-bearing exercise; supplemental vitamin D3, 50 mcg daily; and daily calcium intake of 1200 mg, preferably from dietary sources.  Additional tests were arranged to screen for secondary causes, with results pending.  He was advised to submit a 24-hour urine specimen for analysis of calcium excretion.  Further recommendations will be based on these results.    9.  Abnormal spirometry.  No symptoms to suggest significant asthma.  I recommend formal pulmonary function test with methacholine challenge to verify the spirometry suggestion of obstructive lung disease.    10.  Preventive care.  As noted above, he was advised to reduce consumption of alcohol.  I recommended recombinant zoster vaccination x 1 and will recommend consideration of RSV vaccination; he prefers to arrange vaccinations on his own.  I will recommend supplemental vitamin D3, 50 mcg daily, while maintaining daily calcium intake of 1200 mg, preferably from dietary sources.  Using AHA guidelines, his estimated 10-year risks are as follows: CVD 9.9%; ASCVD 5.0%; heart failure 6.3%.  I recommend that he discuss with his primary physician the possibility of using low-strength (81 mg) aspirin on a daily basis until age 69.  I recommend treatment with a moderate-intensity statin, which he will consider; a summary of a 2016  Lancet article by Brody Lora reviewing risks and benefits of statin treatment was provided.  Genetic counseling was recommended, as noted above.  Colonoscopy will be due in 2027.  PCV 20 will be due in 11/2025.     PLAN: See above.     ~SRT

## 2024-03-21 NOTE — GROUP NOTE
[facility]  [address], [address]  [place]  [address]    Group Progress Note  [facility] - Progress Note    Date of Service: 3/21/24   Name: Ramin Murcia  : 1955  Medical Record Number: [id number]  START TIME:  4:00 PM  END TIME:  6:00 PM  FACILITATOR(S): Mason Adkins, PhD; Dior Baltazar  TOPIC: [facility] Group Therapy  Type of Session: Group  :  Number of Attendees:  5  Number of Minutes:  120    SERVICE MODALITY:  Video Visit:      Provider verified identity through the following two step process.  Patient provided:  Patient is known previously to provider    Telemedicine Visit: The patient's condition can be safely assessed and treated via synchronous audio and visual telemedicine encounter.      Reason for Telemedicine Visit: Services only offered telehealth    Originating Site (Patient Location): Patient's home    Distant Site (Provider Location): [facility] Setting: [facility]    Consent:  The patient/guardian has verbally consented to: the potential risks and benefits of telemedicine (video visit) versus in person care; bill my insurance or make self-payment for services provided; and responsibility for payment of non-covered services.     Patient would like the video invitation sent by:  Other e-mail: see chart    Mode of Communication:  Video Conference via Medical Zoom    Distant Location (Provider):  On-site    As the provider I attest to compliance with applicable laws and regulations related to telemedicine.    Trainee Kim Parra attended group to observe with group consent      DSM-5 Diagnoses:  Encounter Diagnoses   Name Primary?     Unspecified disruptive, impulse control, and conduct disorder (hypersexuality) Yes     Anxiety disorder, unspecified type      Alcohol use          Current Reported Symptoms and Status update:  Changes since last session includes no boundary violations, working on  relationship repair, some anxiety, plans to stop alcohol use.    Patient's problems with out of control, driven, impulsive, compulsive sexual behavior began in early 2020. He has been in a stable loving marriage for 40 years then began having covert hook-ups with young men.  Efforts to change problematic behaviors have included several years of psychotherapy and attenance in [facility]. He continues to struggle with recurrent and intense urges which cause him distress.     Client reports experiencing some anxiety/worry and associated distress. He attributes distress to ineffective coping, patterns of avoidance/numbing, and difficulties with functioning in relationships.      Progress Toward Treatment Goals:   Satisfactory progress     Therapeutic Interventions/Treatment Strategies:    Area(s) of treatment focus addressed in this session included Symptom Management, Interpersonal Relationship Skills and Sexual Health and Wellness    CBT and Interpersonal Interventions were implemented in the group process. Members provided updates on progress towards treatment goals, coping, sexual and relationship functioning, mental health, and any substance use concerns. Members shared progress on homework and relationship repair. Group members provided each other supportive and constructive feedback to facilitate further treatment progress and foster connection. Client shared plans to stop alcohol use. He also described ways in which he intends to proactively contribute to his relationship.  Psychotherapist offered support, feedback and validation and reinforced use of skills Treatment modalities used include [facility] THERAPY INTERVENTIONS: Cognitive Behavioral Therapy Interpersonal  Support and Feedback    Patient Response:   Patient responded to session by accepting feedback, giving feedback, listening, focusing on goals and accepting support  Possible barriers to participation / learning include: N/A    Current Mental Status Exam:    Appearance:  Appropriate   Eye Contact:  Good   Attitude / Demeanor: Cooperative   Speech      Rate / Production: Normal/ Responsive      Volume:  Normal  volume  Orientation:  All  Mood:   Normal  Affect:   Appropriate   Thought Content: Clear   Insight:   Good       Plan/Need for Future Services:  Return for therapy in 1 week to treat diagnosed problems.    Patient has a current master individualized treatment plan.  See Epic treatment plan for more information.    Referral / Collaboration:  Referral to another professional/service is not indicated at this time..  Emergency Services Needed?  No    Assignment:  Explore support for alcohol use    Interactive Complexity:  There are four specific communication difficulties that complicate the work of the primary psychiatric procedure.  Interactive complexity (+67918) may be reported when at least one of these difficulties is present.    Communication difficulties present during current the psychiatric procedure include:  1. None.      Signature/Title:    Mason Adkins, PhD, LP    [place] [facility], Sexual [facility] Clinic  Department of Family Medicine and Community Health  [facility]         normal

## 2024-03-28 ENCOUNTER — VIRTUAL VISIT (OUTPATIENT)
Dept: PSYCHOLOGY | Facility: CLINIC | Age: 69
End: 2024-03-28
Payer: COMMERCIAL

## 2024-03-28 DIAGNOSIS — F91.8 CONDUCT DISORDER, UNDIFFERENTIATED TYPE: Primary | ICD-10-CM

## 2024-03-28 DIAGNOSIS — F41.9 ANXIETY DISORDER, UNSPECIFIED TYPE: ICD-10-CM

## 2024-03-28 DIAGNOSIS — Z78.9 ALCOHOL USE: ICD-10-CM

## 2024-03-28 PROCEDURE — 90853 GROUP PSYCHOTHERAPY: CPT | Mod: 95 | Performed by: STUDENT IN AN ORGANIZED HEALTH CARE EDUCATION/TRAINING PROGRAM

## 2024-03-28 NOTE — GROUP NOTE
[facility]  [address], [address]  [place]  [address]    Group Progress Note  [facility] - Progress Note    Date of Service: 3/28/24   Name: Ramin Murcia  : 1955  Medical Record Number: [id number]  START TIME:  4:00 PM  END TIME:  6:00 PM  FACILITATOR(S): Mason Adkins, PhD; Dior Baltazar  TOPIC: [facility] Group Therapy  Type of Session: Group  :  Number of Attendees:  5  Number of Minutes:  120    SERVICE MODALITY:  Video Visit:      Provider verified identity through the following two step process.  Patient provided:  Patient is known previously to provider    Telemedicine Visit: The patient's condition can be safely assessed and treated via synchronous audio and visual telemedicine encounter.      Reason for Telemedicine Visit: Services only offered telehealth    Originating Site (Patient Location): Patient's home    Distant Site (Provider Location): [facility] Clinics: Sexual and Gender Health Clinic    Consent:  The patient/guardian has verbally consented to: the potential risks and benefits of telemedicine (video visit) versus in person care; bill my insurance or make self-payment for services provided; and responsibility for payment of non-covered services.     Patient would like the video invitation sent by:  Other e-mail: see chart    Mode of Communication:  Video Conference via Medical Zoom    Distant Location (Provider):  On-site    As the provider I attest to compliance with applicable laws and regulations related to telemedicine.      DSM-5 Diagnoses:  Encounter Diagnoses   Name Primary?     Unspecified disruptive, impulse control, and conduct disorder (hypersexuality) Yes     Anxiety disorder, unspecified type      Alcohol use          Current Reported Symptoms and Status update:  Changes since last session includes strong urges, no boundary violations, no alcohol use, some anxiety and worry.    Progress Toward Treatment Goals:    Satisfactory progress     Therapeutic Interventions/Treatment Strategies:    Area(s) of treatment focus addressed in this session included Personal Safety/Harm Reduction, Interpersonal Relationship Skills and Sexual Health and Wellness    Learner Kim Parra participated with group members  consent. Group members shared updates regarding coping, self-care, mental health, sexual health, and relationships. Members also provided updates about homework progress. Members were supported to give each other process and content related feedback to help each other work toward goals. Client reflected on his sense of self and how that impacts his relationship. He shared changes he is starting to make to work on his dynamic with his wife, including demonstrating more respect.  Psychotherapist offered support, feedback and validation and reinforced use of skills Treatment modalities used include [facility] THERAPY INTERVENTIONS: Cognitive Behavioral Therapy Interpersonal  Support and Feedback    Patient Response:   Patient responded to session by accepting feedback, giving feedback, listening, focusing on goals and accepting support  Possible barriers to participation / learning include: N/A    Current Mental Status Exam:   Appearance:  Appropriate   Eye Contact:  Good   Attitude / Demeanor: Cooperative   Speech      Rate / Production: Normal/ Responsive      Volume:  Normal  volume  Orientation:  All  Mood:   Normal  Affect:   Appropriate   Thought Content: Clear   Insight:   Good       Plan/Need for Future Services:  Return for therapy in 2 weeks to treat diagnosed problems.    Patient has a current master individualized treatment plan.  See Epic treatment plan for more information.    Referral / Collaboration:  Referral to another professional/service is not indicated at this time..  Emergency Services Needed?  No    Assignment:  Continue working on communication    Interactive Complexity:  There are four specific communication  difficulties that complicate the work of the primary psychiatric procedure.  Interactive complexity (+16446) may be reported when at least one of these difficulties is present.    Communication difficulties present during current the psychiatric procedure include:  1. None.      Signature/Title:      Mason Adkins, PhD, LP    [place] [facility], Sexual and Gender Health Clinic  Department of Family Medicine and Community Health  [facility]

## 2024-04-01 ENCOUNTER — LAB (OUTPATIENT)
Dept: LAB | Facility: CLINIC | Age: 69
End: 2024-04-01
Payer: COMMERCIAL

## 2024-04-01 DIAGNOSIS — M85.9 LOW BONE DENSITY: ICD-10-CM

## 2024-04-01 LAB
CALCIUM 24H UR-MRATE: 0.21 G/SPEC (ref 0.1–0.3)
CALCIUM UR-MCNC: 14.5 MG/DL
COLLECT DURATION TIME UR: 24 H
SPECIMEN VOL UR: 1437 ML

## 2024-04-01 PROCEDURE — 99000 SPECIMEN HANDLING OFFICE-LAB: CPT | Performed by: PATHOLOGY

## 2024-04-01 PROCEDURE — 81050 URINALYSIS VOLUME MEASURE: CPT | Performed by: INTERNAL MEDICINE

## 2024-04-03 ENCOUNTER — OFFICE VISIT (OUTPATIENT)
Dept: PSYCHOLOGY | Facility: CLINIC | Age: 69
End: 2024-04-03
Payer: COMMERCIAL

## 2024-04-03 DIAGNOSIS — F91.8 CONDUCT DISORDER, UNDIFFERENTIATED TYPE: Primary | ICD-10-CM

## 2024-04-03 DIAGNOSIS — Z78.9 ALCOHOL USE: ICD-10-CM

## 2024-04-03 PROCEDURE — 90847 FAMILY PSYTX W/PT 50 MIN: CPT | Performed by: STUDENT IN AN ORGANIZED HEALTH CARE EDUCATION/TRAINING PROGRAM

## 2024-04-03 NOTE — PROGRESS NOTES
"[place] [facility] Sexual [facility] - Progress Note    Date of Service: 24   Name: Ramin Murcia  : 1955  Medical Record Number: [id number]  Treating Provider: Masno Adkins, PhD  Type of Session: Couple  Present in Session: Elizabeth Faustin, Therapist  Session Start and Stop Time: 4:00pm-4:55pm  Number of Minutes:  55    SERVICE MODALITY:  In-person    DSM-5 Diagnoses:  Encounter Diagnoses   Name Primary?     Unspecified disruptive, impulse control, and conduct disorder (hypersexuality) Yes     Alcohol use          Current Reported Symptoms and Status update:  Changes since last session includes no recent boundary violations, disconnect with his wife, relationship problems secondary to history of compulsive sexual behavior, recent alcohol use.    Patient's problems with out of control, driven, impulsive, compulsive sexual behavior began in early . He has been in a stable loving marriage for 40 years then began having covert hook-ups with young men.  Efforts to change problematic behaviors have included several years of psychotherapy and attenance in [facility]. He continues to struggle with recurrent and intense urges which cause him distress.      Progress Toward Treatment Goals:   Satisfactory progress     Therapeutic Interventions/Treatment Strategies:    Area(s) of treatment focus addressed in this session included Symptom Management and Interpersonal Relationship Skills    Ramin and [place] each recounted a recent argument about rowing and Ramin's commitments, and how this reflects their overall connection, time together, and attention. They also processed a recent attempt of Ramin's to initiate sex and his reaction to being denied. Ramin expressed feeling like the argument was a rupture; Elizabeth expressed feeling like it was a normal argument. They agreed on changing the \"style\" of interaction and conflict. We reflected on the \"substance\" of their conflict - differences in needs for time together, " connection, sex, and concerns about compulsive behaviors. Elizabeth thanked therapist for referrals to individual therapists and stated she will pursue.    Psychotherapist offered support, feedback and validation and reinforced use of skills Treatment modalities used include Cognitive Behavioral Therapy Interpersonal  Support and Feedback    Patient Response:   Patient responded to session by accepting feedback, giving feedback, listening, focusing on goals and accepting support  Possible barriers to participation / learning include: N/A    Current Mental Status Exam:   Appearance:  Appropriate   Eye Contact:  Good   Attitude / Demeanor: Cooperative   Speech      Rate / Production: Normal/ Responsive      Volume:  Normal  volume  Orientation:  All  Mood:   Normal  Affect:   Appropriate   Thought Content: Clear   Insight:   Good       Plan/Need for Future Services:  Return for therapy in 2 weeks to treat diagnosed problems.    Patient has a current master individualized treatment plan.  See Epic treatment plan for more information.    Referral / Collaboration:  Was/were discussed and patient will pursue.  Emergency Services Needed?  No    Assignment:  Identify changes in communication to make    Interactive Complexity:  There are four specific communication difficulties that complicate the work of the primary psychiatric procedure.  Interactive complexity (+05913) may be reported when at least one of these difficulties is present.    Communication difficulties present during current the psychiatric procedure include:  1. None.      Signature/Title:    Mason Adkins, PhD, LP    [place] [facility] Sexual and [facility], Sexual and Gender Health Clinic  Department of Family Medicine and Community Health  [employer]

## 2024-04-11 ENCOUNTER — VIRTUAL VISIT (OUTPATIENT)
Dept: PSYCHOLOGY | Facility: CLINIC | Age: 69
End: 2024-04-11
Payer: COMMERCIAL

## 2024-04-11 DIAGNOSIS — F41.9 ANXIETY DISORDER, UNSPECIFIED TYPE: ICD-10-CM

## 2024-04-11 DIAGNOSIS — Z78.9 ALCOHOL USE: ICD-10-CM

## 2024-04-11 DIAGNOSIS — F91.8 CONDUCT DISORDER, UNDIFFERENTIATED TYPE: Primary | ICD-10-CM

## 2024-04-11 PROCEDURE — 90853 GROUP PSYCHOTHERAPY: CPT | Mod: 95 | Performed by: STUDENT IN AN ORGANIZED HEALTH CARE EDUCATION/TRAINING PROGRAM

## 2024-04-11 NOTE — GROUP NOTE
[facility]  [address], [address]  [place]  [address]    Group Progress Note  [facility] - Progress Note    Date of Service: 24   Name: Ramin Murcia  : 1955  Medical Record Number: [id number]  START TIME:  4:00 PM  END TIME:  5:00 PM  FACILITATOR(S): Mason Adkins, PhD  TOPIC: [facility] Group Therapy  Type of Session: Group  :  Number of Attendees:  7  Number of Minutes:  60; client left group early    SERVICE MODALITY:  Video Visit:      Provider verified identity through the following two step process.  Patient provided:  Patient is known previously to provider    Telemedicine Visit: The patient's condition can be safely assessed and treated via synchronous audio and visual telemedicine encounter.      Reason for Telemedicine Visit: Services only offered telehealth    Originating Site (Patient Location): Patient's home    Distant Site (Provider Location): [facility]: Sexual and Gender Health Clinic    Consent:  The patient/guardian has verbally consented to: the potential risks and benefits of telemedicine (video visit) versus in person care; bill my insurance or make self-payment for services provided; and responsibility for payment of non-covered services.     Patient would like the video invitation sent by:  Other e-mail: see chart    Mode of Communication:  Video Conference via Medical Zoom    Distant Location (Provider):  On-site    As the provider I attest to compliance with applicable laws and regulations related to telemedicine.    Learner Kim Parra observed with group consent      DSM-5 Diagnoses:  Encounter Diagnoses   Name Primary?     Unspecified disruptive, impulse control, and conduct disorder (hypersexuality) Yes     Alcohol use      Anxiety disorder, unspecified type          Current Reported Symptoms and Status update:  Changes since last session includes no boundary violations, some anxiety and worry, working  on his relationship with alcohol, conflict with his wife.    Progress Toward Treatment Goals:   Satisfactory progress     Therapeutic Interventions/Treatment Strategies:    Area(s) of treatment focus addressed in this session included Symptom Management, Interpersonal Relationship Skills and Sexual Health and Wellness    Group members provided status updates on mental health, sexual wellness, relationships, conflict, coping skills, and treatment progress. Members also reflected on how treatment is improving relationships. Members were supported to provide each other process-related feedback. Client processed conflict with his wife and how they spend time together. He shared ways in which he feels his wife holds anger at him for compulsive sexual behavior. He also reflected on differences in relationship goals between himself and his wife. Client provided feedback to others and was supported to receive feedback from others.  Psychotherapist offered support, feedback and validation and reinforced use of skills Treatment modalities used include [facility] THERAPY INTERVENTIONS: Cognitive Behavioral Therapy  Support and Feedback    Patient Response:   Patient responded to session by accepting feedback, giving feedback, listening, focusing on goals and accepting support  Possible barriers to participation / learning include: N/A    Current Mental Status Exam:   Appearance:  Appropriate   Eye Contact:  Good   Attitude / Demeanor: Cooperative   Speech      Rate / Production: Normal/ Responsive      Volume:  Normal  volume  Orientation:  All  Mood:   Normal  Affect:   Appropriate   Thought Content: Clear   Insight:   Good       Plan/Need for Future Services:  Return for therapy in 1 week to treat diagnosed problems.    Patient has a current master individualized treatment plan.  See Epic treatment plan for more information.    Referral / Collaboration:  Referral to another professional/service is not indicated at this  time..  Emergency Services Needed?  No    Assignment:  Work on positive cycle    Interactive Complexity:  There are four specific communication difficulties that complicate the work of the primary psychiatric procedure.  Interactive complexity (+06160) may be reported when at least one of these difficulties is present.    Communication difficulties present during current the psychiatric procedure include:  1. None.      Signature/Title:      Mason Adkins, PhD, LP    [place] [facility], Sexual [facility] Clinic  Department of Family Medicine and Community Health  [facility] [employer]

## 2024-04-16 ENCOUNTER — OFFICE VISIT (OUTPATIENT)
Dept: PSYCHOLOGY | Facility: CLINIC | Age: 69
End: 2024-04-16
Payer: COMMERCIAL

## 2024-04-16 DIAGNOSIS — F41.9 ANXIETY DISORDER, UNSPECIFIED TYPE: ICD-10-CM

## 2024-04-16 DIAGNOSIS — Z78.9 ALCOHOL USE: ICD-10-CM

## 2024-04-16 DIAGNOSIS — F91.8 CONDUCT DISORDER, UNDIFFERENTIATED TYPE: Primary | ICD-10-CM

## 2024-04-16 PROCEDURE — 90847 FAMILY PSYTX W/PT 50 MIN: CPT | Performed by: STUDENT IN AN ORGANIZED HEALTH CARE EDUCATION/TRAINING PROGRAM

## 2024-04-16 NOTE — PROGRESS NOTES
[place] [facility] - Progress Note    Date of Service: 24   Name: Ramin Murcia  : 1955  Medical Record Number: [id number]  Treating Provider: Mason Adkins, PhD  Type of Session: Couple  Present in Session: Elizabeth Faustin, therapist  Session Start and Stop Time: 11:00am-11:50am  Number of Minutes:  50    SERVICE MODALITY:  In-person    DSM-5 Diagnoses:  Encounter Diagnoses   Name Primary?     Unspecified disruptive, impulse control, and conduct disorder (hypersexuality) Yes     Alcohol use      Anxiety disorder, unspecified type          Current Reported Symptoms and Status update:  Changes since last session includes no boundary violations, working on intimacy and relationship repair, recent alcohol use, anxiety reasonably controlled with treatment.    Patient's problems with out of control, driven, impulsive, compulsive sexual behavior began in early . He has been in a stable loving marriage for 40 years then began having covert hook-ups with young men.  Efforts to change problematic behaviors have included several years of psychotherapy and attenance in [facility]. He continues to struggle with recurrent and intense urges which cause him distress.     Client reports experiencing some anxiety/worry and associated distress. He attributes distress to ineffective coping, patterns of avoidance/numbing, and difficulties with functioning in relationships.      Progress Toward Treatment Goals:   Satisfactory progress     Therapeutic Interventions/Treatment Strategies:    Area(s) of treatment focus addressed in this session included Symptom Management, Interpersonal Relationship Skills and Sexual Health and Wellness    Ramin and Elizabeth discussed current relationship dynamics, both agreeing that they are co-existing but not particularly close. Both addressed Ramin's recent alcohol use. They identified goals of wanting to feel more intimacy and loving towards each other and started to identify  recent conflicts and barriers. Therapist supported both to identify ways they could currently accept each other while continuing to work on individual and relationship goals. Provided pscyhoeducation on positive interactions to foster intimacy. We discussed plans for Elizabeth to start individual therapy and Ramin to resume individual therapy, and to check-in as a couple as-needed.    Psychotherapist offered support, feedback and validation and reinforced use of skills Treatment modalities used include Cognitive Behavioral Therapy  Support and Feedback    Patient Response:   Patient responded to session by accepting feedback, giving feedback, listening, focusing on goals and accepting support  Possible barriers to participation / learning include: N/A    Current Mental Status Exam:   Appearance:  Appropriate   Eye Contact:  Good   Attitude / Demeanor: Cooperative   Speech      Rate / Production: Normal/ Responsive      Volume:  Normal  volume  Orientation:  All  Mood:   Normal  Affect:   Appropriate   Thought Content: Clear   Insight:   Good       Plan/Need for Future Services:  Return for therapy in 2 weeks to treat diagnosed problems.    Patient has a current master individualized treatment plan.  See Epic treatment plan for more information.    Referral / Collaboration:  Referral to another professional/service is not indicated at this time..  Emergency Services Needed?  No    Assignment:  Start meeting with Ramin 1:1       Interactive Complexity:  There are four specific communication difficulties that complicate the work of the primary psychiatric procedure.  Interactive complexity (+19851) may be reported when at least one of these difficulties is present.    Communication difficulties present during current the psychiatric procedure include:  1. None.      Signature/Title:      Mason Adkins, PhD, LP    [place] [facility], Sexual and Gender Health Clinic  Department of  Family Medicine and Community Health  [facility]

## 2024-04-18 ENCOUNTER — VIRTUAL VISIT (OUTPATIENT)
Dept: PSYCHOLOGY | Facility: CLINIC | Age: 69
End: 2024-04-18
Payer: COMMERCIAL

## 2024-04-18 DIAGNOSIS — F41.9 ANXIETY DISORDER, UNSPECIFIED TYPE: ICD-10-CM

## 2024-04-18 DIAGNOSIS — F91.8 CONDUCT DISORDER, UNDIFFERENTIATED TYPE: Primary | ICD-10-CM

## 2024-04-18 DIAGNOSIS — Z78.9 ALCOHOL USE: ICD-10-CM

## 2024-04-18 PROCEDURE — 90853 GROUP PSYCHOTHERAPY: CPT | Mod: 95 | Performed by: STUDENT IN AN ORGANIZED HEALTH CARE EDUCATION/TRAINING PROGRAM

## 2024-04-18 NOTE — GROUP NOTE
[facility]  [address], [address]  [place]  [address]    Group Progress Note  [facility] - Progress Note    Date of Service: 24   Name: Ramin Murcia  : 1955  Medical Record Number: [id number]  START TIME:  4:00 PM  END TIME:  5:30 PM  FACILITATOR(S): Mason Adkins, PhD; Dior Baltazar  TOPIC: [facility] Group Therapy  Type of Session: Group  :  Number of Attendees:  6  Number of Minutes:  90    SERVICE MODALITY:  Video Visit:      Provider verified identity through the following two step process.  Patient provided:  Patient is known previously to provider    Telemedicine Visit: The patient's condition can be safely assessed and treated via synchronous audio and visual telemedicine encounter.      Reason for Telemedicine Visit: Services only offered telehealth    Originating Site (Patient Location): Patient's home    Distant Site (Provider Location): [facility] Clinics: Sexual and Gender Health Clinic    Consent:  The patient/guardian has verbally consented to: the potential risks and benefits of telemedicine (video visit) versus in person care; bill my insurance or make self-payment for services provided; and responsibility for payment of non-covered services.     Patient would like the video invitation sent by:  Other e-mail: see chart    Mode of Communication:  Video Conference via Medical Zoom    Distant Location (Provider):  On-site    As the provider I attest to compliance with applicable laws and regulations related to telemedicine.    Learners Kim Parra and John Natarajan observed with group consent      DSM-5 Diagnoses:  Encounter Diagnoses   Name Primary?     Unspecified disruptive, impulse control, and conduct disorder (hypersexuality) Yes     Anxiety disorder, unspecified type      Alcohol use          Current Reported Symptoms and Status update:  Changes since last session includes no boundary violations, strong urges, recent  alcohol use, some anxiety, relationship problems.    Progress Toward Treatment Goals:   Satisfactory progress     Therapeutic Interventions/Treatment Strategies:    Area(s) of treatment focus addressed in this session included Symptom Management, Interpersonal Relationship Skills and Sexual Health and Wellness    Group members shared status updates regarding mental health, sexual compulsivity, and relationships. Members shared how they are coping and working on positive cycles. We discussed progress toward treatment goals and homework assignments. Members were encouraged to provide process- and content- oriented feedback. Client processed reactions to others planning on ending group treatment. He reflected on his own treatment progress, including the work he wants to do on alcohol and relationship repair with his wife.  Psychotherapist offered support, feedback and validation and reinforced use of skills Treatment modalities used include [facility] THERAPY INTERVENTIONS: Cognitive Behavioral Therapy  Support and Feedback    Patient Response:   Patient responded to session by accepting feedback, giving feedback, listening, focusing on goals and accepting support  Possible barriers to participation / learning include: N/A    Current Mental Status Exam:   Appearance:  Appropriate   Eye Contact:  Good   Attitude / Demeanor: Cooperative   Speech      Rate / Production: Normal/ Responsive      Volume:  Normal  volume  Orientation:  All  Mood:   Normal  Affect:   Appropriate   Thought Content: Clear   Insight:   Good       Plan/Need for Future Services:  Return for therapy in 1 week to treat diagnosed problems.    Patient has a current master individualized treatment plan.  See Epic treatment plan for more information.    Referral / Collaboration:  Referral to another professional/service is not indicated at this time..  Emergency Services Needed?  No    Assignment:  Continue working on connection with his wife    Interactive  Complexity:  There are four specific communication difficulties that complicate the work of the primary psychiatric procedure.  Interactive complexity (+70573) may be reported when at least one of these difficulties is present.    Communication difficulties present during current the psychiatric procedure include:  1. None.      Signature/Title:      Mason Adkins, PhD, LP    [place] for Sexual and Gender [facility], Sexual and Gender Health Clinic  Department of Family Medicine and Community Health  [facility]

## 2024-04-25 ENCOUNTER — VIRTUAL VISIT (OUTPATIENT)
Dept: PSYCHOLOGY | Facility: CLINIC | Age: 69
End: 2024-04-25
Payer: COMMERCIAL

## 2024-04-25 DIAGNOSIS — F41.9 ANXIETY DISORDER, UNSPECIFIED TYPE: ICD-10-CM

## 2024-04-25 DIAGNOSIS — F91.8 CONDUCT DISORDER, UNDIFFERENTIATED TYPE: Primary | ICD-10-CM

## 2024-04-25 DIAGNOSIS — Z78.9 ALCOHOL USE: ICD-10-CM

## 2024-04-25 PROCEDURE — 90853 GROUP PSYCHOTHERAPY: CPT | Mod: 95 | Performed by: STUDENT IN AN ORGANIZED HEALTH CARE EDUCATION/TRAINING PROGRAM

## 2024-04-25 NOTE — GROUP NOTE
[facility]  [address], [address]  [place]  [address]    Group Progress Note  [facility] - Progress Note    Date of Service: 24   Name: Ramin Murcia  : 1955  Medical Record Number: [id number]  START TIME:  4:00 PM  END TIME:  5:45 PM  FACILITATOR(S): Mason Adkins, PhD; Dior Baltazar  TOPIC: [facility] Group Therapy  Type of Session: Group  :  Number of Attendees:  5  Number of Minutes:  105    SERVICE MODALITY:  Video Visit:      Provider verified identity through the following two step process.  Patient provided:  Patient is known previously to provider    Telemedicine Visit: The patient's condition can be safely assessed and treated via synchronous audio and visual telemedicine encounter.      Reason for Telemedicine Visit: Services only offered telehealth    Originating Site (Patient Location): Patient's home    Distant Site (Provider Location): [facility] Clinics: Sexual and Gender Health Clinic    Consent:  The patient/guardian has verbally consented to: the potential risks and benefits of telemedicine (video visit) versus in person care; bill my insurance or make self-payment for services provided; and responsibility for payment of non-covered services.     Patient would like the video invitation sent by:  Other e-mail: see chart    Mode of Communication:  Video Conference via Medical Zoom    Distant Location (Provider):  On-site    As the provider I attest to compliance with applicable laws and regulations related to telemedicine.    Medical student Stiven Natarajan and prac student Kim Parra joined with group consent      DSM-5 Diagnoses:  Encounter Diagnoses   Name Primary?     Unspecified disruptive, impulse control, and conduct disorder (hypersexuality) Yes     Anxiety disorder, unspecified type      Alcohol use          Current Reported Symptoms and Status update:  Changes since last session includes no boundary violations,  improvement in anxiety, increased intimacy with his wife, alcohol use under control.     Progress Toward Treatment Goals:   Satisfactory progress     Therapeutic Interventions/Treatment Strategies:    Area(s) of treatment focus addressed in this session included Symptom Management, Interpersonal Relationship Skills and Sexual Health and Wellness    Group members shared status updates regarding treatment progress, use of effective coping, sexual health, and relationship functioning. Members shared updates on homework and in working on relationships. Members were supported to provide each other constructive feedback. Client reflected on progress he and his wife have made in their connection. He shared that they recently had sexual intimacy and client reflected on the importance of sexuality in his relationship.   Psychotherapist offered support, feedback and validation and reinforced use of skills Treatment modalities used include [facility] THERAPY INTERVENTIONS: Cognitive Behavioral Therapy  Support and Feedback    Patient Response:   Patient responded to session by accepting feedback, giving feedback, listening, focusing on goals and accepting support  Possible barriers to participation / learning include: N/A    Current Mental Status Exam:   Appearance:  Appropriate   Eye Contact:  Good   Attitude / Demeanor: Cooperative   Speech      Rate / Production: Normal/ Responsive      Volume:  Normal  volume  Orientation:  All  Mood:   Normal  Affect:   Appropriate   Thought Content: Clear   Insight:   Good       Plan/Need for Future Services:  Return for therapy in 1 week to treat diagnosed problems.    Patient has a current master individualized treatment plan.  See Epic treatment plan for more information.    Referral / Collaboration:  Referral to another professional/service is not indicated at this time..  Emergency Services Needed?  No    Assignment:  Continue working on positive cycle    Interactive Complexity:  There are  four specific communication difficulties that complicate the work of the primary psychiatric procedure.  Interactive complexity (+28897) may be reported when at least one of these difficulties is present.    Communication difficulties present during current the psychiatric procedure include:  1. None.      Signature/Title:    Mason Adkins, PhD, LP    [place] for Sexual and Gender [facility], Sexual and Gender Health Clinic  Department of Family Medicine and Community Health  [facility]

## 2024-04-30 ENCOUNTER — OFFICE VISIT (OUTPATIENT)
Dept: PSYCHOLOGY | Facility: CLINIC | Age: 69
End: 2024-04-30
Payer: COMMERCIAL

## 2024-04-30 DIAGNOSIS — F91.8 CONDUCT DISORDER, UNDIFFERENTIATED TYPE: Primary | ICD-10-CM

## 2024-04-30 DIAGNOSIS — Z78.9 ALCOHOL USE: ICD-10-CM

## 2024-04-30 DIAGNOSIS — F41.9 ANXIETY DISORDER, UNSPECIFIED TYPE: ICD-10-CM

## 2024-04-30 PROCEDURE — 90834 PSYTX W PT 45 MINUTES: CPT | Performed by: STUDENT IN AN ORGANIZED HEALTH CARE EDUCATION/TRAINING PROGRAM

## 2024-04-30 NOTE — PROGRESS NOTES
"[facility] Sexual [facility] - Progress Note    Date of Service: 24   Name: Ramin Murcia  : 1955  Medical Record Number: [id number]  Treating Provider: Mason Adkins, PhD  Type of Session: Individual  Present in Session: Client and therapist  Session Start and Stop Time: 2:00pm-2:45pm  Number of Minutes:  45    SERVICE MODALITY:  In-person    DSM-5 Diagnoses:  Encounter Diagnoses   Name Primary?     Unspecified disruptive, impulse control, and conduct disorder (hypersexuality) Yes     Anxiety disorder, unspecified type      Alcohol use          Current Reported Symptoms and Status update:  Changes since last session includes no boundary violations, increased urges and fantasy, working on relationship repair, anxiety reasonably managed with treatment, moderate alcohol use.    Patient's problems with out of control, driven, impulsive, compulsive sexual behavior began in early . He has been in a stable loving marriage for 40 years then began having covert hook-ups with young men.  Efforts to change problematic behaviors have included several years of psychotherapy and attenance in [facility]. He continues to struggle with recurrent and intense urges which cause him distress.     Client reports experiencing some anxiety/worry and associated distress. He attributes distress to ineffective coping, patterns of avoidance/numbing, and difficulties with functioning in relationships.      Progress Toward Treatment Goals:   Satisfactory progress     Therapeutic Interventions/Treatment Strategies:    Area(s) of treatment focus addressed in this session included Symptom Management, Interpersonal Relationship Skills and Sexual Health and Wellness    Client processed recent interactions/conflict with Elizabeth. We discussed his treatment progress. We identified thinking errors/challenges client experiences (minimizing, \"narrative\") and discussed alternative viewpoints. Assigned client to practice active " listening with Elizabeth. Also assigned identifying factors contributing to intimacy beyond power and control.     Psychotherapist offered support, feedback and validation and reinforced use of skills Treatment modalities used include Cognitive Behavioral Therapy  Support, Redirection and Feedback    Patient Response:   Patient responded to session by accepting feedback, giving feedback, listening, focusing on goals and accepting support  Possible barriers to participation / learning include: N/A    Current Mental Status Exam:   Appearance:  Appropriate   Eye Contact:  Good   Attitude / Demeanor: Cooperative  Interested  Speech      Rate / Production: Normal/ Responsive      Volume:  Normal  volume  Orientation:  All  Mood:   Normal  Affect:   Appropriate   Thought Content: Clear   Insight:   Good       Plan/Need for Future Services:  Return for therapy in 2 weeks to treat diagnosed problems.    Patient has a current master individualized treatment plan.  See Epic treatment plan for more information.    Referral / Collaboration:  Referral to another professional/service is not indicated at this time..  Emergency Services Needed?  No    Assignment:  Active listening  List of factors affecting intimacy    Interactive Complexity:  There are four specific communication difficulties that complicate the work of the primary psychiatric procedure.  Interactive complexity (+04930) may be reported when at least one of these difficulties is present.    Communication difficulties present during current the psychiatric procedure include:  1. None.      Signature/Title:      Mason Adkins, PhD, LP    [facility], Sexual and Gender Health Clinic  Department of Family Medicine and Community Health  [facility] [employer]

## 2024-05-02 ENCOUNTER — VIRTUAL VISIT (OUTPATIENT)
Dept: PSYCHOLOGY | Facility: CLINIC | Age: 69
End: 2024-05-02
Payer: COMMERCIAL

## 2024-05-02 ENCOUNTER — MYC MEDICAL ADVICE (OUTPATIENT)
Dept: INTERNAL MEDICINE | Facility: CLINIC | Age: 69
End: 2024-05-02
Payer: COMMERCIAL

## 2024-05-02 DIAGNOSIS — F91.8 CONDUCT DISORDER, UNDIFFERENTIATED TYPE: Primary | ICD-10-CM

## 2024-05-02 DIAGNOSIS — Z78.9 ALCOHOL USE: ICD-10-CM

## 2024-05-02 DIAGNOSIS — F41.9 ANXIETY DISORDER, UNSPECIFIED TYPE: ICD-10-CM

## 2024-05-02 PROCEDURE — 90853 GROUP PSYCHOTHERAPY: CPT | Mod: 95 | Performed by: STUDENT IN AN ORGANIZED HEALTH CARE EDUCATION/TRAINING PROGRAM

## 2024-05-02 NOTE — GROUP NOTE
[facility]  [address], [address]  [place]  [address]    Group Progress Note  [facility] - Progress Note    Date of Service: 24   Name: Ramin Murcia  : 1955  Medical Record Number: [id number]  START TIME:  4:00 PM  END TIME:  5:30 PM  FACILITATOR(S): Mason Adkins, PhD; Dior Baltazar  TOPIC: [facility] Group Therapy  Type of Session: Group  :  Number of Attendees:  5  Number of Minutes:  90    SERVICE MODALITY:  Video Visit:      Provider verified identity through the following two step process.  Patient provided:  Patient is known previously to provider    Telemedicine Visit: The patient's condition can be safely assessed and treated via synchronous audio and visual telemedicine encounter.      Reason for Telemedicine Visit: Services only offered telehealth    Originating Site (Patient Location): Patient's home    Distant Site (Provider Location): [facility]: Sexual and Gender Health Clinic    Consent:  The patient/guardian has verbally consented to: the potential risks and benefits of telemedicine (video visit) versus in person care; bill my insurance or make self-payment for services provided; and responsibility for payment of non-covered services.     Patient would like the video invitation sent by:  Other e-mail: see chart    Mode of Communication:  Video Conference via Medical Zoom    Distant Location (Provider):  On-site    As the provider I attest to compliance with applicable laws and regulations related to telemedicine.      DSM-5 Diagnoses:  Encounter Diagnoses   Name Primary?     Unspecified disruptive, impulse control, and conduct disorder (hypersexuality) Yes     Anxiety disorder, unspecified type      Alcohol use          Current Reported Symptoms and Status update:  Changes since last session includes no boundary violations, increased sexual urges, anxiety reasonably managed with treatment, using alcohol in  moderation.    Progress Toward Treatment Goals:   Satisfactory progress     Therapeutic Interventions/Treatment Strategies:    Area(s) of treatment focus addressed in this session included Symptom Management, Interpersonal Relationship Skills and Sexual Health and Wellness    CBT interventions were used in support of group members treatment. Group members provided updates on progress toward treatment goals, use of effective coping skills, engaging social support, managing compulsive sexual behavior concerns, and relationships. Member were supported to provide each other feedback and express compassion. Client processed ways in which he and his wife have navigated recent conflict. He is considering ways in which his relationship experiences cycles. We discussed how he contributes to those cycles.   Psychotherapist offered support, feedback and validation and reinforced use of skills Treatment modalities used include [facility] THERAPY INTERVENTIONS: Cognitive Behavioral Therapy  Support and Feedback    Patient Response:   Patient responded to session by accepting feedback, giving feedback, listening, focusing on goals and accepting support  Possible barriers to participation / learning include: N/A    Current Mental Status Exam:   Appearance:  Appropriate   Eye Contact:  Good   Attitude / Demeanor: Cooperative   Speech      Rate / Production: Normal/ Responsive      Volume:  Normal  volume  Orientation:  All  Mood:   Normal  Affect:   Appropriate   Thought Content: Clear   Insight:   Good       Plan/Need for Future Services:  Return for therapy in 1 week to treat diagnosed problems.    Patient has a current master individualized treatment plan.  See Epic treatment plan for more information.    Referral / Collaboration:  Referral to another professional/service is not indicated at this time..  Emergency Services Needed?  No    Assignment:  Continue identifying factors affecting the relationship    Interactive  Complexity:  There are four specific communication difficulties that complicate the work of the primary psychiatric procedure.  Interactive complexity (+58396) may be reported when at least one of these difficulties is present.    Communication difficulties present during current the psychiatric procedure include:  1. None.      Signature/Title:    Mason Adkins, PhD, LP    [facility], Sexual [facility] Health Clinic  Department of Family Medicine and Community Health  [facility]

## 2024-05-02 NOTE — CONFIDENTIAL NOTE
Ramin,    With regard to your question regarding [facility] scheduling, I discussed your situation with their medical director. My understanding is that their schedulers likely attempted to reach you without success.    The medical director suggested that you and Elizabeth call the clinic directly to schedule an appointment with one of their doctors.  For you, I would suggest scheduling an appointment with Gael Alba M.D., who currently is accepting new patients.    For Elizabeth, I would suggest establishing care with Ling Bobo M.D. or Lissett Castro M.D., both of whom are accepting new patients.      The phone number for [facility] is [phone].    If you experience any difficulties or have any questions, please contact me.      Best wishes,    Arley Guo

## 2024-05-09 ENCOUNTER — VIRTUAL VISIT (OUTPATIENT)
Dept: PSYCHOLOGY | Facility: CLINIC | Age: 69
End: 2024-05-09
Payer: COMMERCIAL

## 2024-05-09 DIAGNOSIS — F41.9 ANXIETY DISORDER, UNSPECIFIED TYPE: ICD-10-CM

## 2024-05-09 DIAGNOSIS — F91.8 CONDUCT DISORDER, UNDIFFERENTIATED TYPE: Primary | ICD-10-CM

## 2024-05-09 DIAGNOSIS — Z78.9 ALCOHOL USE: ICD-10-CM

## 2024-05-09 PROCEDURE — 90853 GROUP PSYCHOTHERAPY: CPT | Mod: 95 | Performed by: STUDENT IN AN ORGANIZED HEALTH CARE EDUCATION/TRAINING PROGRAM

## 2024-05-09 NOTE — GROUP NOTE
[facility]  [address], [address]  [place]  [address]    Group Progress Note  [facility] - Progress Note    Date of Service: 24   Name: Ramin Murcia  : 1955  Medical Record Number: [id number]  START TIME:  4:00 PM  END TIME:  5:50 PM  FACILITATOR(S): Mason Adkins, PhD; Dior Baltazar  TOPIC: [facility] Group Therapy  Type of Session: Group  :  Number of Attendees:  6  Number of Minutes:  110    SERVICE MODALITY:  Video Visit:      Provider verified identity through the following two step process.  Patient provided:  Patient is known previously to provider    Telemedicine Visit: The patient's condition can be safely assessed and treated via synchronous audio and visual telemedicine encounter.      Reason for Telemedicine Visit: Services only offered telehealth    Originating Site (Patient Location): Patient's home    Distant Site (Provider Location): [facility] Clinics: Sexual and Gender Health Clinic    Consent:  The patient/guardian has verbally consented to: the potential risks and benefits of telemedicine (video visit) versus in person care; bill my insurance or make self-payment for services provided; and responsibility for payment of non-covered services.     Patient would like the video invitation sent by:  Email - see chart    Mode of Communication:  Video Conference via Medical Zoom    Distant Location (Provider):  On-site    As the provider I attest to compliance with applicable laws and regulations related to telemedicine.      DSM-5 Diagnoses:  Encounter Diagnoses   Name Primary?     Unspecified disruptive, impulse control, and conduct disorder (hypersexuality) Yes     Anxiety disorder, unspecified type      Alcohol use          Current Reported Symptoms and Status update:  Changes since last session includes no boundary violations, recent alcohol use, anxiety reasonably controlled with treatment.    Patient's problems with out of  control, driven, impulsive, compulsive sexual behavior began in early 2020. He has been in a stable loving marriage for 40 years then began having covert hook-ups with young men.  Efforts to change problematic behaviors have included several years of psychotherapy and attenance in [facility]. He continues to struggle with recurrent and intense urges which cause him distress.     Client reports experiencing some anxiety/worry and associated distress. He attributes distress to ineffective coping, patterns of avoidance/numbing, and difficulties with functioning in relationships.      Progress Toward Treatment Goals:   Satisfactory progress     Therapeutic Interventions/Treatment Strategies:    Area(s) of treatment focus addressed in this session included Symptom Management, Interpersonal Relationship Skills and Sexual Health and Wellness    Group members shared updates regarding treatment progress, maintaining boundaries, and honoring commitments. They shared ways in which they are connecting with partners and social support. They also identified coping, risk situations, and concerns about mental health. Members supported each other through giving process- and content- focused feedback. Client shared ways in which he plans to be more integrated with daily life and connect with his wife. He also shared upcoming vacation plans and how he can stay in positive cycle.  Psychotherapist offered support, feedback and validation and reinforced use of skills Treatment modalities used include [facility] THERAPY INTERVENTIONS: Cognitive Behavioral Therapy  Support and Feedback    Patient Response:   Patient responded to session by accepting feedback, giving feedback, listening, focusing on goals and accepting support  Possible barriers to participation / learning include: N/A    Current Mental Status Exam:   Appearance:  Appropriate   Eye Contact:  Good   Attitude / Demeanor: Cooperative   Speech      Rate / Production: Normal/ Responsive       Volume:  Normal  volume  Orientation:  All  Mood:   Normal  Affect:   Appropriate   Thought Content: Clear   Insight:   Good       Plan/Need for Future Services:  Return for therapy in 1 week to treat diagnosed problems.    Patient has a current master individualized treatment plan.  See Epic treatment plan for more information.    Referral / Collaboration:  None needed    Emergency Services Needed?  No    Assignment:  Continue connection with his wife    Interactive Complexity:  There are four specific communication difficulties that complicate the work of the primary psychiatric procedure.  Interactive complexity (+49545) may be reported when at least one of these difficulties is present.    Communication difficulties present during current the psychiatric procedure include:  1. None.      Signature/Title:    Mason Adkins, PhD, LP    [facility], Sexual and Gender Health Clinic  Department of Family Medicine and Community Health  [employer] [facility] [employer]

## 2024-05-30 ENCOUNTER — VIRTUAL VISIT (OUTPATIENT)
Dept: PSYCHOLOGY | Facility: CLINIC | Age: 69
End: 2024-05-30
Payer: COMMERCIAL

## 2024-05-30 DIAGNOSIS — Z78.9 ALCOHOL USE: ICD-10-CM

## 2024-05-30 DIAGNOSIS — F91.8 CONDUCT DISORDER, UNDIFFERENTIATED TYPE: Primary | ICD-10-CM

## 2024-05-30 DIAGNOSIS — F41.9 ANXIETY DISORDER, UNSPECIFIED TYPE: ICD-10-CM

## 2024-05-30 PROCEDURE — 90853 GROUP PSYCHOTHERAPY: CPT | Mod: 95 | Performed by: STUDENT IN AN ORGANIZED HEALTH CARE EDUCATION/TRAINING PROGRAM

## 2024-05-30 NOTE — GROUP NOTE
[facility]  [address], [address]  [place]  [address]    Group Progress Note  [facility] - Progress Note    Date of Service: 24   Name: Ramin Murcia  : 1955  Medical Record Number: [id number]  START TIME:  4:00 PM  END TIME:  5:30 PM  FACILITATOR(S): Mason Adkins, PhD; Dior Baltazar  TOPIC: [facility] Group Therapy  Type of Session: Group  :  Number of Attendees:  5  Number of Minutes:  90    SERVICE MODALITY:  Video Visit:      Provider verified identity through the following two step process.  Patient provided:  Patient photo and Patient is known previously to provider    Telemedicine Visit: The patient's condition can be safely assessed and treated via synchronous audio and visual telemedicine encounter.      Reason for Telemedicine Visit: Services only offered telehealth    Originating Site (Patient Location): Patient's home    Distant Site (Provider Location): [facility] Clin[facility]: Sexual and Gender Health Clinic    Consent:  The patient/guardian has verbally consented to: the potential risks and benefits of telemedicine (video visit) versus in person care; bill my insurance or make self-payment for services provided; and responsibility for payment of non-covered services.     Patient would like the video invitation sent by:  Other e-mail: see chart    Mode of Communication:  Video Conference via Medical Zoom    Distant Location (Provider):  On-site    As the provider I attest to compliance with applicable laws and regulations related to telemedicine.      DSM-5 Diagnoses:  Encounter Diagnoses   Name Primary?    Unspecified disruptive, impulse control, and conduct disorder (hypersexuality) Yes    Anxiety disorder, unspecified type     Alcohol use          Current Reported Symptoms and Status update:  Changes since last session includes no boundary violations, enjoying positive sexuality, some anxiety and worry, more alcohol use while on  vacation.    Patient's problems with out of control, driven, impulsive, compulsive sexual behavior began in early 2020. He has been in a stable loving marriage for 40 years then began having covert hook-ups with young men.  Efforts to change problematic behaviors have included several years of psychotherapy and attenance in [facility]. He continues to struggle with recurrent and intense urges which cause him distress.     Client reports experiencing some anxiety/worry and associated distress. He attributes distress to ineffective coping, patterns of avoidance/numbing, and difficulties with functioning in relationships.    Progress Toward Treatment Goals:   Satisfactory progress     Therapeutic Interventions/Treatment Strategies:    Area(s) of treatment focus addressed in this session included Symptom Management, Interpersonal Relationship Skills, and Sexual Health and Wellness    Group members checked in regarding mental health, sexual health, relationship functioning, substance use, and coping/self-care. Members shared updates regarding homework progress and managing compulsive behavior. Members were supported to provide each other feedback. Client processed ways in which he is working to improve intimacy in his relationship. He also talked about ways in which he is managing urges, compulsivity, and relationships.   Psychotherapist offered support, feedback and validation and reinforced use of skills Treatment modalities used include [facility] THERAPY INTERVENTIONS: Cognitive Behavioral Therapy  Support and Feedback    Patient Response:   Patient responded to session by accepting feedback, giving feedback, listening, focusing on goals, and accepting support  Possible barriers to participation / learning include: N/A    Current Mental Status Exam:   Appearance:  Appropriate   Eye Contact:  Good   Attitude / Demeanor: Cooperative   Speech      Rate / Production: Normal/ Responsive      Volume:  Normal   volume  Orientation:  All  Mood:   Normal  Affect:   Appropriate   Thought Content: Clear   Insight:   Good       Plan/Need for Future Services:  Return for therapy in 1 week to treat diagnosed problems.    Patient has a current master individualized treatment plan.  See Epic treatment plan for more information.    Referral / Collaboration:  Referral to another professional/service is not indicated at this time..  Emergency Services Needed?  No    Assignment:  Continue working on relationship repair    Interactive Complexity:  There are four specific communication difficulties that complicate the work of the primary psychiatric procedure.  Interactive complexity (+07163) may be reported when at least one of these difficulties is present.    Communication difficulties present during current the psychiatric procedure include:  None.      Signature/Title:      Mason Adkins, PhD, LP    [facility], Sexual and Gender Health Clinic  Department of Family Medicine and Community Health  [facility] [employer]

## 2024-06-03 ENCOUNTER — OFFICE VISIT (OUTPATIENT)
Dept: PSYCHOLOGY | Facility: CLINIC | Age: 69
End: 2024-06-03
Payer: COMMERCIAL

## 2024-06-03 DIAGNOSIS — F41.9 ANXIETY DISORDER, UNSPECIFIED TYPE: ICD-10-CM

## 2024-06-03 DIAGNOSIS — F91.8 CONDUCT DISORDER, UNDIFFERENTIATED TYPE: Primary | ICD-10-CM

## 2024-06-03 PROCEDURE — 90834 PSYTX W PT 45 MINUTES: CPT | Performed by: STUDENT IN AN ORGANIZED HEALTH CARE EDUCATION/TRAINING PROGRAM

## 2024-06-03 NOTE — PROGRESS NOTES
[facility] - Progress Note    Date of Service: 24   Name: Ramin Murcia  : 1955  Medical Record Number: [id number]  Treating Provider: Mason Adkins, PhD  Type of Session: Individual  Present in Session: Client and therapist  Session Start and Stop Time: 11:02am-11:42am  Number of Minutes:  40    SERVICE MODALITY:  In-person    DSM-5 Diagnoses:  Encounter Diagnoses   Name Primary?    Unspecified disruptive, impulse control, and conduct disorder (hypersexuality) Yes    Anxiety disorder, unspecified type          Current Reported Symptoms and Status update:  Changes since last session includes no boundary violations, increased sex drive, improvement in communication with his wife, improvement in anxiety.     Patient's problems with out of control, driven, impulsive, compulsive sexual behavior began in early . He has been in a stable loving marriage for 40 years then began having covert hook-ups with young men.  Efforts to change problematic behaviors have included several years of psychotherapy and attenance in [facility]. He continues to struggle with recurrent and intense urges which cause him distress.     Client reports experiencing some anxiety/worry and associated distress. He attributes distress to ineffective coping, patterns of avoidance/numbing, and difficulties with functioning in relationships.    Progress Toward Treatment Goals:   Satisfactory progress     Therapeutic Interventions/Treatment Strategies:    Area(s) of treatment focus addressed in this session included Symptom Management, Interpersonal Relationship Skills, and Sexual Health and Wellness    Client processed feeling more connected with his wife, increased sex drive, and wanting to continue to work on their relationship. We reviewed factors related to his sexual desire. Therapist normalized client's sex drive. We practiced reframing strategies to target self-criticism and shame. We discussed impacts of  self-judgement/shame on his relationship. Assigned client to write a letter forgiving himself.    Psychotherapist offered support, feedback and validation, provided redirection, and reinforced use of skills Treatment modalities used include Cognitive Behavioral Therapy  Support and Feedback    Patient Response:   Patient responded to session by accepting feedback, giving feedback, listening, focusing on goals, and accepting support  Possible barriers to participation / learning include: N/A    Current Mental Status Exam:   Appearance:  Appropriate   Eye Contact:  Good   Attitude / Demeanor: Cooperative   Speech      Rate / Production: Normal/ Responsive      Volume:  Normal  volume  Orientation:  All  Mood:   Normal  Affect:   Appropriate   Thought Content: Clear   Insight:   Good       Plan/Need for Future Services:  Return for therapy in 2 weeks to treat diagnosed problems.    Patient has a current master individualized treatment plan.  See Epic treatment plan for more information.    Referral / Collaboration:  Referral to another professional/service is not indicated at this time..  Emergency Services Needed?  No    Assignment:  Self-forgiveness letter    Interactive Complexity:  There are four specific communication difficulties that complicate the work of the primary psychiatric procedure.  Interactive complexity (+81562) may be reported when at least one of these difficulties is present.    Communication difficulties present during current the psychiatric procedure include:  None.      Signature/Title:      Mason Adkins, PhD, LP    [place], Sexual and Gender Health Clinic  Department of Family Medicine and Community Health  [employer] [facility] [employer]

## 2024-06-06 ENCOUNTER — VIRTUAL VISIT (OUTPATIENT)
Dept: PSYCHOLOGY | Facility: CLINIC | Age: 69
End: 2024-06-06
Payer: COMMERCIAL

## 2024-06-06 DIAGNOSIS — F91.8 CONDUCT DISORDER, UNDIFFERENTIATED TYPE: Primary | ICD-10-CM

## 2024-06-06 DIAGNOSIS — F41.9 ANXIETY DISORDER, UNSPECIFIED TYPE: ICD-10-CM

## 2024-06-06 PROCEDURE — 90853 GROUP PSYCHOTHERAPY: CPT | Mod: 95 | Performed by: STUDENT IN AN ORGANIZED HEALTH CARE EDUCATION/TRAINING PROGRAM

## 2024-06-06 NOTE — GROUP NOTE
[facility]  [address], [address]  [place]  [address]    Group Progress Note  [facility] - Progress Note    Date of Service: 24   Name: Ramin Murcia  : 1955  Medical Record Number: [id number]  START TIME:  4:00 PM  END TIME:  5:20 PM  FACILITATOR(S): Mason Adkins, PhD; Dior Baltazar  TOPIC: [facility] Group Therapy  Type of Session: Group  :  Number of Attendees:  7  Number of Minutes:  80    SERVICE MODALITY:  Video Visit:      Provider verified identity through the following two step process.  Patient provided:  Patient is known previously to provider and Patient was verified at admission/transfer    Telemedicine Visit: The patient's condition can be safely assessed and treated via synchronous audio and visual telemedicine encounter.      Reason for Telemedicine Visit: Services only offered telehealth    Originating Site (Patient Location): Patient's home    Distant Site (Provider Location): [facility]: Sexual and Gender Health Clinic    Consent:  The patient/guardian has verbally consented to: the potential risks and benefits of telemedicine (video visit) versus in person care; bill my insurance or make self-payment for services provided; and responsibility for payment of non-covered services.     Patient would like the video invitation sent by:  Other e-mail: see chart    Mode of Communication:  Video Conference via Medical Zoom    Distant Location (Provider):  On-site    As the provider I attest to compliance with applicable laws and regulations related to telemedicine.      DSM-5 Diagnoses:  Encounter Diagnoses   Name Primary?    Unspecified disruptive, impulse control, and conduct disorder (hypersexuality) Yes    Anxiety disorder, unspecified type          Current Reported Symptoms and Status update:  Changes since last session includes no boundary violations, some anxiety, increased libido.    Patient's problems with out of  control, driven, impulsive, compulsive sexual behavior began in early 2020. He has been in a stable loving marriage for 40 years then began having covert hook-ups with young men.  Efforts to change problematic behaviors have included several years of psychotherapy and attenance in [facility]. He continues to struggle with recurrent and intense urges which cause him distress.     Client reports experiencing some anxiety/worry and associated distress. He attributes distress to ineffective coping, patterns of avoidance/numbing, and difficulties with functioning in relationships.    Progress Toward Treatment Goals:   Satisfactory progress     Therapeutic Interventions/Treatment Strategies:    Area(s) of treatment focus addressed in this session included Symptom Management and Interpersonal Relationship Skills    Group members checked-in about sexual health, relationship functioning, coping, social support, boundaries, and homework completion. The focus of today's session was on the ending of treatment for one of the group members. Members provided feedback and hopes for the person leaving group. Members also received feedback from the person ending. Members practiced reflecting on the relationship and identifying/expressing emotions. The group planned for next week's farewell to another group member.  Psychotherapist offered support, feedback and validation and reinforced use of skills Treatment modalities used include [facility] THERAPY INTERVENTIONS: Cognitive Behavioral Therapy  Support and Feedback    Patient Response:   Patient responded to session by accepting feedback, giving feedback, listening, focusing on goals, and accepting support  Possible barriers to participation / learning include: N/A    Current Mental Status Exam:   Appearance:  Appropriate   Eye Contact:  Good   Attitude / Demeanor: Cooperative   Speech      Rate / Production: Normal/ Responsive      Volume:  Normal   volume  Orientation:  All  Mood:   Normal  Affect:   Appropriate   Thought Content: Clear   Insight:   Good       Plan/Need for Future Services:  Return for therapy in 1 week to treat diagnosed problems.    Patient has a current master individualized treatment plan.  See Epic treatment plan for more information.    Referral / Collaboration:  Referral to another professional/service is not indicated at this time..  Emergency Services Needed?  No    Assignment:  None assigned    Interactive Complexity:  There are four specific communication difficulties that complicate the work of the primary psychiatric procedure.  Interactive complexity (+04868) may be reported when at least one of these difficulties is present.    Communication difficulties present during current the psychiatric procedure include:  None.      Signature/Title:      Mason Adkins, PhD, LP    [place] [facility], Sexual [facility] Gender Health Clinic  Department of Family Medicine and Community Health  [employer] [facility] [employer]

## 2024-06-13 ENCOUNTER — TELEPHONE (OUTPATIENT)
Dept: PSYCHOLOGY | Facility: CLINIC | Age: 69
End: 2024-06-13
Payer: COMMERCIAL

## 2024-06-13 ENCOUNTER — VIRTUAL VISIT (OUTPATIENT)
Dept: PSYCHOLOGY | Facility: CLINIC | Age: 69
End: 2024-06-13
Payer: COMMERCIAL

## 2024-06-13 DIAGNOSIS — Z78.9 ALCOHOL USE: ICD-10-CM

## 2024-06-13 DIAGNOSIS — F41.9 ANXIETY DISORDER, UNSPECIFIED TYPE: ICD-10-CM

## 2024-06-13 DIAGNOSIS — F91.8 CONDUCT DISORDER, UNDIFFERENTIATED TYPE: Primary | ICD-10-CM

## 2024-06-13 PROCEDURE — 90853 GROUP PSYCHOTHERAPY: CPT | Mod: 95 | Performed by: STUDENT IN AN ORGANIZED HEALTH CARE EDUCATION/TRAINING PROGRAM

## 2024-06-13 NOTE — GROUP NOTE
[facility]  [address], [address]  [place]  [address]    Group Progress Note  [facility] - Progress Note    Date of Service: 24   Name: Ramin Murcia  : 1955  Medical Record Number: [id number]  START TIME:  4:00 PM  END TIME:  5:30 PM  FACILITATOR(S): Mason Adkins, PhD; Dior Baltazar  TOPIC: [facility] Group Therapy  Type of Session: Group  :  Number of Attendees:  6  Number of Minutes:  90    SERVICE MODALITY:  Video Visit:      Provider verified identity through the following two step process.  Patient provided:  Patient is known previously to provider    Telemedicine Visit: The patient's condition can be safely assessed and treated via synchronous audio and visual telemedicine encounter.      Reason for Telemedicine Visit: Services only offered telehealth    Originating Site (Patient Location): Patient's home    Distant Site (Provider Location): [facility]: Sexual and Gender Health Clinic    Consent:  The patient/guardian has verbally consented to: the potential risks and benefits of telemedicine (video visit) versus in person care; bill my insurance or make self-payment for services provided; and responsibility for payment of non-covered services.     Patient would like the video invitation sent by:  Other e-mail: see chart    Mode of Communication:  Video Conference via Medical Zoom    Distant Location (Provider):  On-site    As the provider I attest to compliance with applicable laws and regulations related to telemedicine.      DSM-5 Diagnoses:  Encounter Diagnoses   Name Primary?    Unspecified disruptive, impulse control, and conduct disorder (hypersexuality) Yes    Anxiety disorder, unspecified type     Alcohol use          Current Reported Symptoms and Status update:  Changes since last session includes no boundary violations, improving relationship with his wife, connecting with family.    Patient's problems with out of  control, driven, impulsive, compulsive sexual behavior began in early 2020. He has been in a stable loving marriage for 40 years then began having covert hook-ups with young men.  Efforts to change problematic behaviors have included several years of psychotherapy and attenance in [facility]. He continues to struggle with recurrent and intense urges which cause him distress.     Client reports experiencing some anxiety/worry and associated distress. He attributes distress to ineffective coping, patterns of avoidance/numbing, and difficulties with functioning in relationships.    Progress Toward Treatment Goals:   Satisfactory progress     Therapeutic Interventions/Treatment Strategies:    Area(s) of treatment focus addressed in this session included Symptom Management and Interpersonal Relationship Skills    Group members provided updates on homework progress, relationship functioning, sexual health, mental health, and coping. The focus of today's session was on ending care for a group members. Members provided that group member with feedback and reflections on treatment progress and therapeutic relationships. The group member also provided others with similar input. We planned for the final group session of another member next week. Client reflected on how his relationship with the group member has motivated his own progress.  Psychotherapist offered support, feedback and validation and reinforced use of skills Treatment modalities used include [facility] THERAPY INTERVENTIONS: Cognitive Behavioral Therapy  Support and Feedback    Patient Response:   Patient responded to session by accepting feedback, giving feedback, listening, focusing on goals, and accepting support  Possible barriers to participation / learning include: N/A    Current Mental Status Exam:   Appearance:  Appropriate   Eye Contact:  Good   Attitude / Demeanor: Cooperative   Speech      Rate / Production: Normal/ Responsive      Volume:  Normal   volume  Orientation:  All  Mood:   Normal  Affect:   Appropriate   Thought Content: Clear   Insight:   Good       Plan/Need for Future Services:  Return for therapy in 1 week to treat diagnosed problems.    Patient has a current master individualized treatment plan.  See Epic treatment plan for more information.    Referral / Collaboration:  Referral to another professional/service is not indicated at this time..  Emergency Services Needed?  No    Assignment:  Continue self-care    Interactive Complexity:  There are four specific communication difficulties that complicate the work of the primary psychiatric procedure.  Interactive complexity (+11928) may be reported when at least one of these difficulties is present.    Communication difficulties present during current the psychiatric procedure include:  None.      Signature/Title:      Mason Adkins, PhD, LP    [place] [facility], Sexual and Gender Health Clinic  Department of Family Medicine and Community Health  [employer] [facility] [employer]

## 2024-06-13 NOTE — TELEPHONE ENCOUNTER
[facility] Call Center    Phone Message    May a detailed message be left on voicemail: yes     Reason for Call: Appointment  Referring Provider Name: Mason Adkins    Pt's group session on 7/11 will need to be canceled or rescheduled due to his provider being on vacation that day.     Action Taken: Other: Lvm for pt informing him about group cancellation    Travel Screening: Not Applicable     Date of Service: 6/13/2024 CF

## 2024-06-17 ENCOUNTER — OFFICE VISIT (OUTPATIENT)
Dept: PSYCHOLOGY | Facility: CLINIC | Age: 69
End: 2024-06-17
Payer: COMMERCIAL

## 2024-06-17 DIAGNOSIS — F91.8 CONDUCT DISORDER, UNDIFFERENTIATED TYPE: Primary | ICD-10-CM

## 2024-06-17 DIAGNOSIS — Z78.9 ALCOHOL USE: ICD-10-CM

## 2024-06-17 PROCEDURE — 90834 PSYTX W PT 45 MINUTES: CPT | Performed by: STUDENT IN AN ORGANIZED HEALTH CARE EDUCATION/TRAINING PROGRAM

## 2024-06-17 NOTE — PROGRESS NOTES
[place] [facility] Sexual [facility] - Progress Note    Date of Service: 24   Name: Ramin Murcia  : 1955  Medical Record Number: [id number]  Treating Provider: Mason Adkins, PhD  Type of Session: Individual  Present in Session: Client and therapist  Session Start and Stop Time: 10:00am-10:40am  Number of Minutes:  40    SERVICE MODALITY:  In-person    DSM-5 Diagnoses:  Encounter Diagnoses   Name Primary?    Unspecified disruptive, impulse control, and conduct disorder (hypersexuality) Yes    Alcohol use          Current Reported Symptoms and Status update:  Changes since last session includes no boundary violations, working on relationship repair and self-forgiveness, considering stopping [facility], increased motivation on goals and assignments.     Patient's problems with out of control, driven, impulsive, compulsive sexual behavior began in early . He has been in a stable loving marriage for 40 years then began having covert hook-ups with young men.  Efforts to change problematic behaviors have included several years of psychotherapy and attenance in [facility]. He continues to struggle with recurrent and intense urges which cause him distress.     Client reports experiencing some anxiety/worry and associated distress. He attributes distress to ineffective coping, patterns of avoidance/numbing, and difficulties with functioning in relationships.    Progress Toward Treatment Goals:   Satisfactory progress     Therapeutic Interventions/Treatment Strategies:    Area(s) of treatment focus addressed in this session included Symptom Management, Interpersonal Relationship Skills, and Sexual Health and Wellness    Client reflected on the group process and benefits he is deriving from treatment group. We reviewed his self-forgiveness exercise, which Elizabeth told client she agrees with. We highlighted this as extra validation for forgiveness. Provided client feedback to add elements about why he can be forgiven  "because of who he is, not just what he has done to make corrections. Encouraged client to invite Elizabeth to a session to share why she thinks client deserves self-forgiveness. Reviewed treatment goals and progress with client. Discussed completing treatment when he \"does not feel like it is a test.\" Client plans to reduce alcohol use. We identified changes in his responses to feedback from [place] to reduce feelings of rebellion in alcohol use.    Psychotherapist offered support, feedback and validation and reinforced use of skills Treatment modalities used include Cognitive Behavioral Therapy  Support and Feedback    Patient Response:   Patient responded to session by accepting feedback, giving feedback, listening, focusing on goals, and accepting support  Possible barriers to participation / learning include: N/A    Current Mental Status Exam:   Appearance:  Appropriate   Eye Contact:  Good   Attitude / Demeanor: Cooperative   Speech      Rate / Production: Normal/ Responsive      Volume:  Normal  volume  Orientation:  All  Mood:   Normal  Affect:   Appropriate   Thought Content: Clear   Insight:   Good       Plan/Need for Future Services:  Return for therapy in 2 weeks to treat diagnosed problems.    Patient has a current master individualized treatment plan.  See Epic treatment plan for more information.    Referral / Collaboration:  Referral to another professional/service is not indicated at this time..  Emergency Services Needed?  No    Assignment:  Continue self-forgiveness exercise    Interactive Complexity:  There are four specific communication difficulties that complicate the work of the primary psychiatric procedure.  Interactive complexity (+66218) may be reported when at least one of these difficulties is present.    Communication difficulties present during current the psychiatric procedure include:  None.      Signature/Title:      Mason Adkins, PhD, LP    [place] for " Sexual and Gender Health, Sexual and Gender Health Clinic  Department of Family Medicine and Community Health  [facility]

## 2024-06-20 ENCOUNTER — VIRTUAL VISIT (OUTPATIENT)
Dept: PSYCHOLOGY | Facility: CLINIC | Age: 69
End: 2024-06-20
Payer: COMMERCIAL

## 2024-06-20 DIAGNOSIS — Z78.9 ALCOHOL USE: ICD-10-CM

## 2024-06-20 DIAGNOSIS — F91.8 CONDUCT DISORDER, UNDIFFERENTIATED TYPE: Primary | ICD-10-CM

## 2024-06-20 DIAGNOSIS — F41.9 ANXIETY DISORDER, UNSPECIFIED TYPE: ICD-10-CM

## 2024-06-20 PROCEDURE — 90853 GROUP PSYCHOTHERAPY: CPT | Mod: 95 | Performed by: STUDENT IN AN ORGANIZED HEALTH CARE EDUCATION/TRAINING PROGRAM

## 2024-06-20 NOTE — GROUP NOTE
[facility]  [address], [address]  [place]  [address]    Group Progress Note  [facility] Clinic - Progress Note    Date of Service: 24   Name: Ramin Murcia  : 1955  Medical Record Number: [id number]  START TIME:  5:00 PM  END TIME:  6:00 PM  FACILITATOR(S): Mason Adkins, PhD; Dior Baltazar  TOPIC: [facility] Group Therapy  Type of Session: Group  :  Number of Attendees:  4  Number of Minutes:  60    SERVICE MODALITY:  Video Visit:      Provider verified identity through the following two step process.  Patient provided:  Patient is known previously to provider    Telemedicine Visit: The patient's condition can be safely assessed and treated via synchronous audio and visual telemedicine encounter.      Reason for Telemedicine Visit: Services only offered telehealth    Originating Site (Patient Location): Patient's home    Distant Site (Provider Location): [facility]: Sexual and Gender Health Clinic    Consent:  The patient/guardian has verbally consented to: the potential risks and benefits of telemedicine (video visit) versus in person care; bill my insurance or make self-payment for services provided; and responsibility for payment of non-covered services.     Patient would like the video invitation sent by:  Other e-mail: see chart    Mode of Communication:  Video Conference via Medical Zoom    Distant Location (Provider):  On-site    As the provider I attest to compliance with applicable laws and regulations related to telemedicine.      DSM-5 Diagnoses:  Encounter Diagnoses   Name Primary?    Unspecified disruptive, impulse control, and conduct disorder (hypersexuality) Yes    Alcohol use     Anxiety disorder, unspecified type          Current Reported Symptoms and Status update:  Changes since last session includes no boundary violations, improvement in alcohol use, minimal anxiety.    Patient's problems with out of control, driven,  impulsive, compulsive sexual behavior began in early 2020. He has been in a stable loving marriage for 40 years then began having covert hook-ups with young men.  Efforts to change problematic behaviors have included several years of psychotherapy and attenance in [facility]. He continues to struggle with recurrent and intense urges which cause him distress.     Client reports experiencing some anxiety/worry and associated distress. He attributes distress to ineffective coping, patterns of avoidance/numbing, and difficulties with functioning in relationships.    Progress Toward Treatment Goals:   Satisfactory progress     Therapeutic Interventions/Treatment Strategies:    Area(s) of treatment focus addressed in this session included Interpersonal Relationship Skills    Group members checked in about mental health, sexual health, and relationships. The focus of today's session was on ending group therapy for a member. Group members were supported to share their thoughts, experiences, and goals for the person leaving.  Client reflected on how the group member helped him connect with his feelings.   Psychotherapist offered support, feedback and validation and reinforced use of skills Treatment modalities used include [facility] THERAPY INTERVENTIONS: Cognitive Behavioral Therapy  Support and Feedback    Patient Response:   Patient responded to session by accepting feedback, giving feedback, listening, focusing on goals, and accepting support  Possible barriers to participation / learning include: N/A    Current Mental Status Exam:   Appearance:  Appropriate   Eye Contact:  Good   Attitude / Demeanor: Cooperative   Speech      Rate / Production: Normal/ Responsive      Volume:  Normal  volume  Orientation:  All  Mood:   Normal  Affect:   Appropriate   Thought Content: Clear   Insight:   Good       Plan/Need for Future Services:  Return for therapy in 1 week to treat diagnosed problems.    Patient has a current master individualized  treatment plan.  See Epic treatment plan for more information.    Referral / Collaboration:  Referral to another professional/service is not indicated at this time..  Emergency Services Needed?  No    Assignment:  Continue self-care    Interactive Complexity:  There are four specific communication difficulties that complicate the work of the primary psychiatric procedure.  Interactive complexity (+87406) may be reported when at least one of these difficulties is present.    Communication difficulties present during current the psychiatric procedure include:  None.      Signature/Title:    Mason Adkins, PhD, LP    [place] for Sexual and Gender Health, Sexual and Gender Health Clinic  Department of Family Medicine and Community Health  [facility] [employer]

## 2024-07-05 DIAGNOSIS — R35.0 BENIGN PROSTATIC HYPERPLASIA WITH URINARY FREQUENCY: ICD-10-CM

## 2024-07-05 DIAGNOSIS — N40.1 BENIGN PROSTATIC HYPERPLASIA WITH URINARY FREQUENCY: ICD-10-CM

## 2024-07-09 RX ORDER — TADALAFIL 10 MG/1
10 TABLET ORAL DAILY
Qty: 90 TABLET | Refills: 0 | Status: CANCELLED | OUTPATIENT
Start: 2024-07-09

## 2024-07-10 DIAGNOSIS — N40.1 BENIGN PROSTATIC HYPERPLASIA WITH URINARY FREQUENCY: ICD-10-CM

## 2024-07-10 DIAGNOSIS — R35.0 BENIGN PROSTATIC HYPERPLASIA WITH URINARY FREQUENCY: ICD-10-CM

## 2024-07-10 RX ORDER — TADALAFIL 5 MG/1
5 TABLET ORAL DAILY
Qty: 90 TABLET | Refills: 2 | Status: SHIPPED | OUTPATIENT
Start: 2024-07-10

## 2024-07-18 ENCOUNTER — VIRTUAL VISIT (OUTPATIENT)
Dept: PSYCHOLOGY | Facility: CLINIC | Age: 69
End: 2024-07-18
Payer: COMMERCIAL

## 2024-07-18 DIAGNOSIS — F41.9 ANXIETY DISORDER, UNSPECIFIED TYPE: ICD-10-CM

## 2024-07-18 DIAGNOSIS — F91.8 CONDUCT DISORDER, UNDIFFERENTIATED TYPE: Primary | ICD-10-CM

## 2024-07-18 PROCEDURE — 90853 GROUP PSYCHOTHERAPY: CPT | Mod: 95 | Performed by: STUDENT IN AN ORGANIZED HEALTH CARE EDUCATION/TRAINING PROGRAM

## 2024-07-18 NOTE — GROUP NOTE
[facility]  [address], [address]  [place]  [address]    Group Progress Note  [facility] - Progress Note    Date of Service: 24   Name: Ramin Murcia  : 1955  Medical Record Number: [id number]  START TIME:  4:00 PM  END TIME:  5:40 PM  FACILITATOR(S): Mason Adkins, PhD  TOPIC: SG[facility] Group Therapy  Type of Session: Group  :  Number of Attendees:  4  Number of Minutes:  100    SERVICE MODALITY:  Video Visit:      Provider verified identity through the following two step process.  Patient provided:  Patient is known previously to provider    Telemedicine Visit: The patient's condition can be safely assessed and treated via synchronous audio and visual telemedicine encounter.      Reason for Telemedicine Visit: Services only offered telehealth    Originating Site (Patient Location): Patient's home    Distant Site (Provider Location): [facility] Clinics: Sexual and Gender Health Clinic    Consent:  The patient/guardian has verbally consented to: the potential risks and benefits of telemedicine (video visit) versus in person care; bill my insurance or make self-payment for services provided; and responsibility for payment of non-covered services.     Patient would like the video invitation sent by:  Other e-mail: see chart    Mode of Communication:  Video Conference via Medical Zoom    Distant Location (Provider):  On-site    As the provider I attest to compliance with applicable laws and regulations related to telemedicine.      DSM-5 Diagnoses:  Encounter Diagnoses   Name Primary?    Unspecified disruptive, impulse control, and conduct disorder (hypersexuality) Yes    Anxiety disorder, unspecified type          Current Reported Symptoms and Status update:  Changes since last session includes no boundary violations, some risk situations and urges, anxiety reasonably managed with treatment.    Patient's problems with out of control, driven,  impulsive, compulsive sexual behavior began in early 2020. He has been in a stable loving marriage for 40 years then began having covert hook-ups with young men.  Efforts to change problematic behaviors have included several years of psychotherapy and attenance in [facility]. He continues to struggle with recurrent and intense urges which cause him distress.     Client reports experiencing some anxiety/worry and associated distress. He attributes distress to ineffective coping, patterns of avoidance/numbing, and difficulties with functioning in relationships.    Progress Toward Treatment Goals:   Satisfactory progress     Therapeutic Interventions/Treatment Strategies:    Area(s) of treatment focus addressed in this session included Symptom Management, Interpersonal Relationship Skills, and Sexual Health and Wellness    Group members checked-in with updates to treatment, effective coping, relationship skills, managing mental health, and sexual health concerns. Members also discussed how the current sociocultural/sociopolitical context affects mental health/well-being. Members provided each other support and encouragement. We discussed plans to move the group in-person in a few weeks. Client shared ways in which he is working on his relationship and communication/engagement with his wife. He also identified factors that are contributing to him maintaining a positive cycle, including reducing alcohol and coping more effectively. He also processed how his recent 69th birthday has caused him to reflect on his identity and sense of self.  Psychotherapist offered support, feedback and validation and reinforced use of skills Treatment modalities used include [facility] THERAPY INTERVENTIONS: Cognitive Behavioral Therapy  Support and Feedback    Patient Response:   Patient responded to session by accepting feedback, giving feedback, listening, focusing on goals, and accepting support  Possible barriers to participation / learning  include: N/A    Current Mental Status Exam:   Appearance:  Appropriate   Eye Contact:  Good   Attitude / Demeanor: Cooperative   Speech      Rate / Production: Normal/ Responsive      Volume:  Normal  volume  Orientation:  All  Mood:   Normal  Affect:   Appropriate   Thought Content: Clear   Insight:   Good       Plan/Need for Future Services:  Return for therapy in 1 week to treat diagnosed problems.    Patient has a current master individualized treatment plan.  See Epic treatment plan for more information.    Referral / Collaboration:  None  Emergency Services Needed?  No    Assignment:  Journal about vision of future life    Interactive Complexity:  There are four specific communication difficulties that complicate the work of the primary psychiatric procedure.  Interactive complexity (+66031) may be reported when at least one of these difficulties is present.    Communication difficulties present during current the psychiatric procedure include:  None.      Signature/Title:    Mason Adkins, PhD, LP    [facility], Sexual and Gender Health Clinic  Department of Family Medicine and Community Health  [facility] [employer]

## 2024-07-29 ENCOUNTER — OFFICE VISIT (OUTPATIENT)
Dept: PSYCHOLOGY | Facility: CLINIC | Age: 69
End: 2024-07-29
Payer: COMMERCIAL

## 2024-07-29 DIAGNOSIS — Z78.9 ALCOHOL USE: ICD-10-CM

## 2024-07-29 DIAGNOSIS — F91.8 CONDUCT DISORDER, UNDIFFERENTIATED TYPE: Primary | ICD-10-CM

## 2024-07-29 DIAGNOSIS — F41.9 ANXIETY DISORDER, UNSPECIFIED TYPE: ICD-10-CM

## 2024-07-29 PROCEDURE — 90834 PSYTX W PT 45 MINUTES: CPT | Performed by: STUDENT IN AN ORGANIZED HEALTH CARE EDUCATION/TRAINING PROGRAM

## 2024-07-29 NOTE — PROGRESS NOTES
[facility] Sexual [facility] - Progress Note    Date of Service: 24   Name: Ramin Murcia  : 1955  Medical Record Number: [id number]  Treating Provider: Mason Adkins, PhD  Type of Session: Individual  Present in Session: Client and therapist  Session Start and Stop Time: 10:02am-10:40am  Number of Minutes:  38     SERVICE MODALITY:  In-person    DSM-5 Diagnoses:  Encounter Diagnoses   Name Primary?    Unspecified disruptive, impulse control, and conduct disorder (hypersexuality) Yes    Anxiety disorder, unspecified type     Alcohol use          Current Reported Symptoms and Status update:  Changes since last session includes no boundary violations, working on developing more integrated sense of self, anxiety reasonably managed with treatment, recent alcohol use and plans to reduce consumption.    Patient's problems with out of control, driven, impulsive, compulsive sexual behavior began in early . He has been in a stable loving marriage for 40 years then began having covert hook-ups with young men.  Efforts to change problematic behaviors have included several years of psychotherapy and attenance in [facility]. He continues to struggle with recurrent and intense urges which cause him distress.     Client reports experiencing some anxiety/worry and associated distress. He attributes distress to ineffective coping, patterns of avoidance/numbing, and difficulties with functioning in relationships.    Progress Toward Treatment Goals:   Satisfactory progress     Therapeutic Interventions/Treatment Strategies:    Area(s) of treatment focus addressed in this session included Symptom Management, Interpersonal Relationship Skills, and Sexual Health and Wellness    Client shared that he recently completed a bike race in [place]. He noted sexual arousal but not boundary violations. Client shared recent conflict with Elizabeth. We identified core negative beliefs he might be responding to in conflict. Client  will invite Elizabeth to a future session. We reviewed client starting sexual health inventory and expanding his cycles to include a more integrated sense of self.     Psychotherapist offered support, feedback and validation and reinforced use of skills Treatment modalities used include Cognitive Behavioral Therapy  Support and Feedback    Patient Response:   Patient responded to session by accepting feedback, giving feedback, listening, focusing on goals, and accepting support  Possible barriers to participation / learning include: N/A    Current Mental Status Exam:   Appearance:  Appropriate   Eye Contact:  Good   Attitude / Demeanor: Cooperative   Speech      Rate / Production: Normal/ Responsive      Volume:  Normal  volume  Orientation:  All  Mood:   Normal  Affect:   Appropriate   Thought Content: Clear   Insight:   Good       Plan/Need for Future Services:  Return for therapy in 2 weeks to treat diagnosed problems.    Patient has a current master individualized treatment plan.  See Epic treatment plan for more information.    Referral / Collaboration:  Referral to another professional/service is not indicated at this time..  Emergency Services Needed?  No    Assignment:  Sexual health inventory  Explore positive sense of self  Expand positive cycle    Interactive Complexity:  There are four specific communication difficulties that complicate the work of the primary psychiatric procedure.  Interactive complexity (+80807) may be reported when at least one of these difficulties is present.    Communication difficulties present during current the psychiatric procedure include:  None.        Mason Adkins, PhD, LP    [facility] Sexual [facility], Sexual and Gender Health Clinic  Department of Family Medicine and Community Health  [employer] [facility]

## 2024-08-01 ENCOUNTER — VIRTUAL VISIT (OUTPATIENT)
Dept: PSYCHOLOGY | Facility: CLINIC | Age: 69
End: 2024-08-01
Payer: COMMERCIAL

## 2024-08-01 DIAGNOSIS — F41.9 ANXIETY DISORDER, UNSPECIFIED TYPE: ICD-10-CM

## 2024-08-01 DIAGNOSIS — F91.8 CONDUCT DISORDER, UNDIFFERENTIATED TYPE: Primary | ICD-10-CM

## 2024-08-01 DIAGNOSIS — Z78.9 ALCOHOL USE: ICD-10-CM

## 2024-08-01 PROCEDURE — 90853 GROUP PSYCHOTHERAPY: CPT | Mod: 95 | Performed by: STUDENT IN AN ORGANIZED HEALTH CARE EDUCATION/TRAINING PROGRAM

## 2024-08-01 NOTE — GROUP NOTE
[facility]  [address], [address]  [place]  [address]    Group Progress Note  [facility] - Progress Note    Date of Service: 24   Name: Ramin Murcia  : 1955  Medical Record Number: [id number]  START TIME:  4:00 PM  END TIME:  6:00 PM  FACILITATOR(S): Mason Adkins, PhD; Dior Baltazar  TOPIC: SG[facility] Group Therapy  Type of Session: Group  :  Number of Attendees:  5  Number of Minutes:  120    SERVICE MODALITY:  Video Visit:      Provider verified identity through the following two step process.  Patient provided:  Patient is known previously to provider    Telemedicine Visit: The patient's condition can be safely assessed and treated via synchronous audio and visual telemedicine encounter.      Reason for Telemedicine Visit: Services only offered telehealth    Originating Site (Patient Location): Patient's home    Distant Site (Provider Location): [facility] Clinics: Sexual and Gender Health Clinic    Consent:  The patient/guardian has verbally consented to: the potential risks and benefits of telemedicine (video visit) versus in person care; bill my insurance or make self-payment for services provided; and responsibility for payment of non-covered services.     Patient would like the video invitation sent by:  Other e-mail: see chart    Mode of Communication:  Video Conference via Medical Zoom    Distant Location (Provider):  On-site    As the provider I attest to compliance with applicable laws and regulations related to telemedicine.      DSM-5 Diagnoses:  Encounter Diagnoses   Name Primary?    Unspecified disruptive, impulse control, and conduct disorder (hypersexuality) Yes    Anxiety disorder, unspecified type     Alcohol use          Current Reported Symptoms and Status update:  Changes since last session includes no boundary violations, some risks/urges, anxiety reasonably well managed, working on relationship repair with his wife,  reducing alcohol use.    Patient's problems with out of control, driven, impulsive, compulsive sexual behavior began in early 2020. He has been in a stable loving marriage for 40 years then began having covert hook-ups with young men.  Efforts to change problematic behaviors have included several years of psychotherapy and attenance in [facility]. He continues to struggle with recurrent and intense urges which cause him distress.     Client reports experiencing some anxiety/worry and associated distress. He attributes distress to ineffective coping, patterns of avoidance/numbing, and difficulties with functioning in relationships.    Progress Toward Treatment Goals:   Satisfactory progress     Therapeutic Interventions/Treatment Strategies:    Area(s) of treatment focus addressed in this session included Symptom Management, Interpersonal Relationship Skills, and Sexual Health and Wellness    Group members introduced themselves to a new group member. We reviewed group rules, communication, privacy, and confidentiality. CBT and interpersonal group approaches were implemented in support of members' goals. Members discussed similarities and shared experiences in each other's history of sexual compulsivity.  Client shared about how he is imagining his future and his sense of future self. He talked through steps towards self-forgiveness. He also discussed how is starting to develop a healthy self-acceptance.   Psychotherapist offered support, feedback and validation and reinforced use of skills Treatment modalities used include [facility] THERAPY INTERVENTIONS: Cognitive Behavioral Therapy  Support and Feedback    Patient Response:   Patient responded to session by accepting feedback, giving feedback, listening, focusing on goals, and accepting support  Possible barriers to participation / learning include: N/A    Current Mental Status Exam:   Appearance:  Appropriate   Eye Contact:  Good   Attitude / Demeanor: Cooperative   Speech       Rate / Production: Normal/ Responsive      Volume:  Normal  volume  Orientation:  All  Mood:   Normal  Affect:   Appropriate   Thought Content: Clear   Insight:   Good       Plan/Need for Future Services:  Return for therapy in 1 week to treat diagnosed problems.    Patient has a current master individualized treatment plan.  See Epic treatment plan for more information.    Referral / Collaboration:  Referral to another professional/service is not indicated at this time..  Emergency Services Needed?  No    Assignment:  Continue writing about his current/future self.    Interactive Complexity:  There are four specific communication difficulties that complicate the work of the primary psychiatric procedure.  Interactive complexity (+38603) may be reported when at least one of these difficulties is present.    Communication difficulties present during current the psychiatric procedure include:  None.      Signature/Title:      Mason Adkins, PhD, LP    [facility] for Sexual and Gender [facility], Sexual and Gender Health Clinic  Department of Family Medicine and Community Health  [employer] [facility] [employer]

## 2024-08-08 ENCOUNTER — VIRTUAL VISIT (OUTPATIENT)
Dept: PSYCHOLOGY | Facility: CLINIC | Age: 69
End: 2024-08-08
Payer: COMMERCIAL

## 2024-08-08 DIAGNOSIS — F41.9 ANXIETY DISORDER, UNSPECIFIED TYPE: ICD-10-CM

## 2024-08-08 DIAGNOSIS — Z78.9 ALCOHOL USE: ICD-10-CM

## 2024-08-08 DIAGNOSIS — F91.8 CONDUCT DISORDER, UNDIFFERENTIATED TYPE: Primary | ICD-10-CM

## 2024-08-08 PROCEDURE — 90853 GROUP PSYCHOTHERAPY: CPT | Performed by: STUDENT IN AN ORGANIZED HEALTH CARE EDUCATION/TRAINING PROGRAM

## 2024-08-08 NOTE — GROUP NOTE
Gender [facility] Clinic  [address], [address]  [place]  [address]    Group Progress Note  [facility] Clinic - Progress Note    Date of Service: 24   Name: Ramin Murcia  : 1955  Medical Record Number: [id number]  START TIME:  4:10 PM  END TIME:  6:00 PM  FACILITATOR(S): Mason Adkins, PhD; Dior Baltazar  TOPIC: SG Group Therapy  Type of Session: Group  :  Number of Attendees:  5  Number of Minutes:  110    SERVICE MODALITY:  In-person      DSM-5 Diagnoses:  Encounter Diagnoses   Name Primary?    Unspecified disruptive, impulse control, and conduct disorder (hypersexuality) Yes    Anxiety disorder, unspecified type     Alcohol use        Current Reported Symptoms and Status update:  Changes since last session includes no boundary violations, high risk situations, some anxiety and worry, working on alcohol use concerns.    Patient's problems with out of control, driven, impulsive, compulsive sexual behavior began in early . He has been in a stable loving marriage for 40 years then began having covert hook-ups with young men.  Efforts to change problematic behaviors have included several years of psychotherapy and attenance in [facility]. He continues to struggle with recurrent and intense urges which cause him distress.     Client reports experiencing some anxiety/worry and associated distress. He attributes distress to ineffective coping, patterns of avoidance/numbing, and difficulties with functioning in relationships.    Progress Toward Treatment Goals:   Satisfactory progress     Therapeutic Interventions/Treatment Strategies:    Area(s) of treatment focus addressed in this session included Symptom Management, Interpersonal Relationship Skills, and Sexual Health and Wellness    Group members checked-in with updates to sexual behavior and functioning, relationships, mental health, substance use, and coping. Group members also said farewell to Dior Baltazar,  psychology intern, who finished internship today. Members reflected on how she has positively impacted group treatment and dynamics. Members provided each other updates on homework completion. They provided each other supportive, constructive feedback.  Client shared work he is doing on the sexual health inventory, which is reflecting the ways in which he is integrating healthy sexuality into his sense of self. He also shared his values and boundaries, and how he is considering them more authentically to himself.   Psychotherapist offered support, feedback and validation and reinforced use of skills Treatment modalities used include [facility] THERAPY INTERVENTIONS: Cognitive Behavioral Therapy  Support and Feedback    Patient Response:   Patient responded to session by accepting feedback, giving feedback, listening, focusing on goals, and accepting support  Possible barriers to participation / learning include: N/A    Current Mental Status Exam:   Appearance:  Appropriate   Eye Contact:  Good   Attitude / Demeanor: Cooperative   Speech      Rate / Production: Normal/ Responsive      Volume:  Normal  volume  Orientation:  All  Mood:   Normal  Affect:   Appropriate   Thought Content: Clear   Insight:   Good       Plan/Need for Future Services:  Return for therapy in 1 week to treat diagnosed problems.    Patient has a current master individualized treatment plan.  See Epic treatment plan for more information.    Referral / Collaboration:  Referral to another professional/service is not indicated at this time..  Emergency Services Needed?  No    Assignment:  Continue sexual health inventory    Interactive Complexity:  There are four specific communication difficulties that complicate the work of the primary psychiatric procedure.  Interactive complexity (+41532) may be reported when at least one of these difficulties is present.    Communication difficulties present during current the psychiatric procedure  include:  None.      Signature/Title:    Mason Adkins, PhD, LP    [place] for Sexual and Gender Health, Sexual and Gender Health Clinic  Department of Family Medicine and Community Health  [facility]

## 2024-08-15 ENCOUNTER — OFFICE VISIT (OUTPATIENT)
Dept: PSYCHOLOGY | Facility: CLINIC | Age: 69
End: 2024-08-15
Payer: COMMERCIAL

## 2024-08-15 DIAGNOSIS — F41.9 ANXIETY DISORDER, UNSPECIFIED TYPE: ICD-10-CM

## 2024-08-15 DIAGNOSIS — Z78.9 ALCOHOL USE: ICD-10-CM

## 2024-08-15 DIAGNOSIS — F91.8 CONDUCT DISORDER, UNDIFFERENTIATED TYPE: Primary | ICD-10-CM

## 2024-08-15 PROCEDURE — 90853 GROUP PSYCHOTHERAPY: CPT | Performed by: STUDENT IN AN ORGANIZED HEALTH CARE EDUCATION/TRAINING PROGRAM

## 2024-08-15 NOTE — GROUP NOTE
Gender and Sexual Health Clinic  [address], [address]  [place]  [address]    Group Progress Note  Gender and Sexual Health Clinic - Progress Note    Date of Service: 8/15/24   Name: Ramin Murcia  : 1955  Medical Record Number: [id number]  START TIME:  4:00 PM  END TIME:  6:00 PM  FACILITATOR(S): Mason Adkins, PhD  TOPIC: SG Group Therapy  Type of Session: Group  :  Number of Attendees:  5  Number of Minutes:  120    SERVICE MODALITY:  In-person      DSM-5 Diagnoses:  Encounter Diagnoses   Name Primary?    Unspecified disruptive, impulse control, and conduct disorder (hypersexuality) Yes    Anxiety disorder, unspecified type     Alcohol use          Current Reported Symptoms and Status update:  Changes since last session    CSB: No boundary violations and no urges  Anxiety: Improved  Alcohol use: Continuing to consider boundaries around alcohol use    Progress Toward Treatment Goals:   Satisfactory progress     Therapeutic Interventions/Treatment Strategies:    Area(s) of treatment focus addressed in this session included Symptom Management, Interpersonal Relationship Skills, and Sexual Health and Wellness    Group members provided updates on mental health, sexual health, relationship functioning, coping, and self-care. Members also identified goals for treatment today. Group members shared progress on homework and solicited feedback from members, who provided supportive and constructive feedback.  Client shared ways in which he is managing boundaries and sexual health. He also processed having recently turned 69 and his worries about aging. He also reflected on gratitude and social support.  Psychotherapist offered support, feedback and validation and reinforced use of skills Treatment modalities used include [facility] THERAPY INTERVENTIONS: Cognitive Behavioral Therapy  Support, Feedback, and Education    Patient Response:   Patient responded to session by accepting feedback, giving  feedback, listening, focusing on goals, and accepting support  Possible barriers to participation / learning include: N/A    Current Mental Status Exam:   Appearance:  Appropriate   Eye Contact:  Good   Attitude / Demeanor: Friendly  Speech      Rate / Production: Normal/ Responsive      Volume:  Normal  volume  Orientation:  All  Mood:   Normal  Affect:   Appropriate   Thought Content: Clear   Insight:   Good         Plan/Need for Future Services:  Return for therapy in 1 week to treat diagnosed problems.    Patient has a current master individualized treatment plan.  See Epic treatment plan for more information.    Referral / Collaboration:  Referral to another professional/service is not indicated at this time..  Emergency Services Needed?  No    Assignment:  Continue working on relationship  Define alcohol-related boundaries    Interactive Complexity:  There are four specific communication difficulties that complicate the work of the primary psychiatric procedure.  Interactive complexity (+32525) may be reported when at least one of these difficulties is present.    Communication difficulties present during current the psychiatric procedure include:  None.      Signature/Title:      Mason Adkins, PhD, LP    [facility] for Sexual and Gender Health, Sexual and Gender Health Clinic  Department of Family Medicine and Community Health  [facility] [employer]

## 2024-08-22 ENCOUNTER — OFFICE VISIT (OUTPATIENT)
Dept: PSYCHOLOGY | Facility: CLINIC | Age: 69
End: 2024-08-22
Payer: COMMERCIAL

## 2024-08-22 DIAGNOSIS — F91.8 CONDUCT DISORDER, UNDIFFERENTIATED TYPE: Primary | ICD-10-CM

## 2024-08-22 DIAGNOSIS — Z78.9 ALCOHOL USE: ICD-10-CM

## 2024-08-22 DIAGNOSIS — F41.9 ANXIETY DISORDER, UNSPECIFIED TYPE: ICD-10-CM

## 2024-08-22 PROCEDURE — 90853 GROUP PSYCHOTHERAPY: CPT | Performed by: STUDENT IN AN ORGANIZED HEALTH CARE EDUCATION/TRAINING PROGRAM

## 2024-08-22 NOTE — GROUP NOTE
[facility]  [address], [address]  [place]  [address]    Group Progress Note  [facility] - Progress Note    Date of Service: 24   Name: Ramin Murcia  : 1955  Medical Record Number: [id number]  START TIME:  4:00 PM  END TIME:  6:00 PM  FACILITATOR(S): Mason Adkins, PhD  TOPIC: [facility] Group Therapy  Type of Session: Group  :  Number of Attendees:  4  Number of Minutes:  120    SERVICE MODALITY:  In-person      DSM-5 Diagnoses:  Encounter Diagnoses   Name Primary?    Unspecified disruptive, impulse control, and conduct disorder (hypersexuality) Yes    Alcohol use     Anxiety disorder, unspecified type          Current Reported Symptoms and Status update:  Changes since last session includes no boundary violations, some increased urges/risk situations, recent alcohol use, some depression and anxiety.    Patient's problems with out of control, driven, impulsive, compulsive sexual behavior began in early . He has been in a stable loving marriage for 40 years then began having covert hook-ups with young men.  Efforts to change problematic behaviors have included several years of psychotherapy and attenance in [facility]. He continues to struggle with recurrent and intense urges which cause him distress.     Client reports experiencing some anxiety/worry and associated distress. He attributes distress to ineffective coping, patterns of avoidance/numbing, and difficulties with functioning in relationships.  Progress Toward Treatment Goals:   Satisfactory progress     Therapeutic Interventions/Treatment Strategies:    Area(s) of treatment focus addressed in this session included Symptom Management, Interpersonal Relationship Skills, and Sexual Health and Wellness    Group members provided updates on compulsive sexual behavior, sexual and relationship functioning, boundaries, mental health, and coping. Members also shared progress towards treatment goals  "and homework. Members shared positive, supportive feedback with each other in support of each other's goals. Client shared how he is thinking about his identity and conceptualizing himself as more than just \"a [employer] emery.\" He shared that he wished he could be more open/honest about what he is going through with his friends.   Psychotherapist offered support, feedback and validation and reinforced use of skills Treatment modalities used include [facility] THERAPY INTERVENTIONS: Cognitive Behavioral Therapy  Support and Feedback    Patient Response:   Patient responded to session by accepting feedback, giving feedback, listening, focusing on goals, and accepting support  Possible barriers to participation / learning include: N/A    Current Mental Status Exam:   Appearance:  Appropriate   Eye Contact:  Good   Attitude / Demeanor: Friendly  Speech      Rate / Production: Normal/ Responsive      Volume:  Normal  volume  Orientation:  All  Mood:   Normal  Affect:   Appropriate   Thought Content: Clear   Insight:   Good         Plan/Need for Future Services:  Return for therapy in 1 week to treat diagnosed problems.    Patient has a current master individualized treatment plan.  See Epic treatment plan for more information.    Referral / Collaboration:  Referral to another professional/service is not indicated at this time..  Emergency Services Needed?  No    Assignment:  Continue sexual health inventory    Interactive Complexity:  There are four specific communication difficulties that complicate the work of the primary psychiatric procedure.  Interactive complexity (+65493) may be reported when at least one of these difficulties is present.    Communication difficulties present during current the psychiatric procedure include:  None.      Signature/Title:      Mason Adkins, PhD, LP    [place] [facility], Sexual [facility] Health Clinic  Department of Family Medicine and [facility] " [facility]

## 2024-08-29 ENCOUNTER — OFFICE VISIT (OUTPATIENT)
Dept: PSYCHOLOGY | Facility: CLINIC | Age: 69
End: 2024-08-29
Payer: COMMERCIAL

## 2024-08-29 DIAGNOSIS — F91.8 CONDUCT DISORDER, UNDIFFERENTIATED TYPE: Primary | ICD-10-CM

## 2024-08-29 DIAGNOSIS — F41.9 ANXIETY DISORDER, UNSPECIFIED TYPE: ICD-10-CM

## 2024-08-29 PROCEDURE — 90834 PSYTX W PT 45 MINUTES: CPT | Mod: 59 | Performed by: STUDENT IN AN ORGANIZED HEALTH CARE EDUCATION/TRAINING PROGRAM

## 2024-08-29 PROCEDURE — 90853 GROUP PSYCHOTHERAPY: CPT | Performed by: STUDENT IN AN ORGANIZED HEALTH CARE EDUCATION/TRAINING PROGRAM

## 2024-08-29 NOTE — GROUP NOTE
[facility]  [address], [address]  [place]  [address]    Group Progress Note  [facility] - Progress Note    Date of Service: 24   Name: Ramin Murcia    : 1955  Medical Record Number: [id number]  START TIME:  4:00 PM  END TIME:  6:00 PM  FACILITATOR(S): Mason Adkins, PhD  TOPIC: [facility] Group Therapy  Type of Session: Group  :  Number of Attendees:  4  Number of Minutes:  120    SERVICE MODALITY:  In-person      DSM-5 Diagnoses:  Encounter Diagnoses   Name Primary?    Unspecified disruptive, impulse control, and conduct disorder (hypersexuality) Yes    Anxiety disorder, unspecified type          Current Reported Symptoms and Status update:  Changes since last session includes no boundary violations, increased sexual urges, some worry/anxiety.    Patient's problems with out of control, driven, impulsive, compulsive sexual behavior began in early . He has been in a stable loving marriage for 40 years then began having covert hook-ups with young men.  Efforts to change problematic behaviors have included several years of psychotherapy and attenance in [facility]. He continues to struggle with recurrent and intense urges which cause him distress.     Client reports experiencing some anxiety/worry and associated distress. He attributes distress to ineffective coping, patterns of avoidance/numbing, and difficulties with functioning in relationships.    Progress Toward Treatment Goals:   Satisfactory progress     Therapeutic Interventions/Treatment Strategies:    Area(s) of treatment focus addressed in this session included Symptom Management, Interpersonal Relationship Skills, and Sexual Health and Wellness    Group members shared updates on sexual health, relationship functioning, mental health, coping, substance use, and self-care. Members shared progress on homework and barriers to homework. The group discussed ways of having conversations about  healthy sexuality and desires with partners. We identified sources of social support and coping for group members. Client shared ways in which he is noticing higher sexual urges. He also shared wanting to have a conversation with his wife about sexual desire. We discussed ways in which he could navigate possible outcomes.  Psychotherapist offered support, feedback and validation and reinforced use of skills Treatment modalities used include [facility] THERAPY INTERVENTIONS: Cognitive Behavioral Therapy  Support and Feedback    Patient Response:   Patient responded to session by accepting feedback, giving feedback, listening, focusing on goals, and accepting support  Possible barriers to participation / learning include: N/A    Current Mental Status Exam:   Appearance:  Appropriate   Eye Contact:  Good   Attitude / Demeanor: Friendly  Speech      Rate / Production: Normal/ Responsive      Volume:  Normal  volume  Orientation:  All  Mood:   Normal  Affect:   Appropriate   Thought Content: Clear   Insight:   Good       Plan/Need for Future Services:  Return for therapy in 1 week to treat diagnosed problems.    Patient has a current master individualized treatment plan.  See Epic treatment plan for more information.    Referral / Collaboration:  Referral to another professional/service is not indicated at this time..  Emergency Services Needed?  No    Assignment:  Continue sexual health inventory    Interactive Complexity:  There are four specific communication difficulties that complicate the work of the primary psychiatric procedure.  Interactive complexity (+55314) may be reported when at least one of these difficulties is present.    Communication difficulties present during current the psychiatric procedure include:  None.      Signature/Title:    Mason Adkins, PhD, LP    [place] [facility] Sexual [facility], Sexual and Gender Health Clinic  Department of [facility] and [facility]  [facility]

## 2024-08-29 NOTE — PROGRESS NOTES
[place] for Sexual and Gender Health - Progress Note    Date of Service: 24   Name: Ramin Murcia  : 1955  Medical Record Number: [id number]  Treating Provider: Mason Adkins, PhD  Type of Session: Individual  Present in Session: Client and therapist  Session Start and Stop Time: 9:02am-9:44am  Number of Minutes:  42    SERVICE MODALITY:  In-person    DSM-5 Diagnoses:  Encounter Diagnoses   Name Primary?    Unspecified disruptive, impulse control, and conduct disorder (hypersexuality) Yes    Anxiety disorder, unspecified type          Current Reported Symptoms and Status update:  Changes since last session includes some anxiety/worry about sexuality in the relationship and increased urgency. No boundary violations.    Patient's problems with out of control, driven, impulsive, compulsive sexual behavior began in early . He has been in a stable loving marriage for 40 years then began having covert hook-ups with young men.  Efforts to change problematic behaviors have included several years of psychotherapy and attenance in [facility]. He continues to struggle with recurrent and intense urges which cause him distress.     Client reports experiencing some anxiety/worry and associated distress. He attributes distress to ineffective coping, patterns of avoidance/numbing, and difficulties with functioning in relationships.    Progress Toward Treatment Goals:   Satisfactory progress     Therapeutic Interventions/Treatment Strategies:    Area(s) of treatment focus addressed in this session included Symptom Management, Interpersonal Relationship Skills, and Sexual Health and Wellness    Client shared desire to have more open conversation with Douglas about interests but has fears of rejection or judgement. We discussed ways of framing conversations and planned for client and Elizabeth to join a future session. Client reviewed negative cycle and current urgency. Client reviewed coping and self-care. We discussed  ways of authentically exploring and communicating desires and needs.     Psychotherapist offered support, feedback and validation and reinforced use of skills Treatment modalities used include Cognitive Behavioral Therapy  Support, Feedback, and Clarification    Patient Response:   Patient responded to session by accepting feedback, giving feedback, listening, focusing on goals, and accepting support  Possible barriers to participation / learning include: N/A    Current Mental Status Exam:   Appearance:  Appropriate   Eye Contact:  Good   Attitude / Demeanor: Friendly  Speech      Rate / Production: Normal/ Responsive      Volume:  Normal  volume  Orientation:  All  Mood:   Normal  Affect:   Appropriate   Thought Content: Clear   Insight:   Good         Plan/Need for Future Services:  Return for therapy in 2 weeks to treat diagnosed problems.    Patient has a current master individualized treatment plan.  See Epic treatment plan for more information.    Referral / Collaboration:  Referral to another professional/service is not indicated at this time..  Emergency Services Needed?  No    Assignment:  Sexual health inventory    Interactive Complexity:  There are four specific communication difficulties that complicate the work of the primary psychiatric procedure.  Interactive complexity (+24656) may be reported when at least one of these difficulties is present.    Communication difficulties present during current the psychiatric procedure include:  None.      Signature/Title:    Mason Adkins, PhD, LP    [place] for Sexual and Gender [facility], Sexual and Gender Health Clinic  Department of Family Medicine and Community Health  [employer] [facility] [facility] [employer]

## 2024-09-12 ENCOUNTER — OFFICE VISIT (OUTPATIENT)
Dept: PSYCHOLOGY | Facility: CLINIC | Age: 69
End: 2024-09-12
Payer: COMMERCIAL

## 2024-09-12 DIAGNOSIS — Z78.9 ALCOHOL USE: ICD-10-CM

## 2024-09-12 DIAGNOSIS — F91.8 CONDUCT DISORDER, UNDIFFERENTIATED TYPE: Primary | ICD-10-CM

## 2024-09-12 DIAGNOSIS — F41.9 ANXIETY DISORDER, UNSPECIFIED TYPE: ICD-10-CM

## 2024-09-12 PROCEDURE — 90853 GROUP PSYCHOTHERAPY: CPT | Performed by: STUDENT IN AN ORGANIZED HEALTH CARE EDUCATION/TRAINING PROGRAM

## 2024-09-12 PROCEDURE — 90847 FAMILY PSYTX W/PT 50 MIN: CPT | Mod: 59 | Performed by: STUDENT IN AN ORGANIZED HEALTH CARE EDUCATION/TRAINING PROGRAM

## 2024-09-12 NOTE — GROUP NOTE
[facility]  [address], [address]  [place]  [address]    Group Progress Note  [facility] - Progress Note    Date of Service: 24   Name: Ramin Murcia  : 1955  Medical Record Number: [id number]  START TIME:  4:00 PM  END TIME:  5:30 PM  FACILITATOR(S): Mason Adkisn, PhD; Nithya Lopez, PhD  TOPIC: [facility] Group Therapy  Type of Session: Group  :  Number of Attendees:  4  Number of Minutes:  90    SERVICE MODALITY:  In-person      DSM-5 Diagnoses:  Encounter Diagnoses   Name Primary?    Unspecified disruptive, impulse control, and conduct disorder (hypersexuality) Yes    Anxiety disorder, unspecified type     Alcohol use          Current Reported Symptoms and Status update:  Changes since last session includes no boundary violations, increased urges, relationship conflict, some anxiety, increased alcohol use.    Patient's problems with out of control, driven, impulsive, compulsive sexual behavior began in early . He has been in a stable loving marriage for 40 years then began having covert hook-ups with young men.  Efforts to change problematic behaviors have included several years of psychotherapy and attenance in [facility]. He continues to struggle with recurrent and intense urges which cause him distress.     Client reports experiencing some anxiety/worry and associated distress. He attributes distress to ineffective coping, patterns of avoidance/numbing, and difficulties with functioning in relationships.    Progress Toward Treatment Goals:   Satisfactory progress     Therapeutic Interventions/Treatment Strategies:    Area(s) of treatment focus addressed in this session included Symptom Management, Interpersonal Relationship Skills, and Sexual Health and Wellness    Group members provided updates on sexual health, relationship functioning, boundaries, and coping. Members shared progress on therapy homework and treatment goals. They were  supported to provided each other feedback and support.  Client reflected on his recent couples therapy session. He shared ways in which he intends to focus on changing their dynamic and think more about the relationship than on his own needs.  Psychotherapist offered support, feedback and validation and reinforced use of skills Treatment modalities used include [facility] THERAPY INTERVENTIONS: Cognitive Behavioral Therapy  Support and Feedback    Patient Response:   Patient responded to session by accepting feedback, giving feedback, listening, focusing on goals, and accepting support  Possible barriers to participation / learning include: N/A    Current Mental Status Exam:   Appearance:  Appropriate   Eye Contact:  Good   Attitude / Demeanor: Friendly  Speech      Rate / Production: Normal/ Responsive      Volume:  Normal  volume  Orientation:  All  Mood:   Normal  Affect:   Appropriate   Thought Content: Clear   Insight:   Good         Plan/Need for Future Services:  Return for therapy in 1 week to treat diagnosed problems.    Patient has a current master individualized treatment plan.  See Epic treatment plan for more information.    Referral / Collaboration:  Referral to another professional/service is not indicated at this time..  Emergency Services Needed?  No    Assignment:  Continue relationship work as-needed  Sexual health inventory    Interactive Complexity:  There are four specific communication difficulties that complicate the work of the primary psychiatric procedure.  Interactive complexity (+87732) may be reported when at least one of these difficulties is present.    Communication difficulties present during current the psychiatric procedure include:  None.      Signature/Title:    Mason Adkins, PhD, LP    [place] for Sexual and Gender Health, Sexual and Gender Health Clinic  Department of Family Medicine and Community Health  [employer] [facility] [employer]

## 2024-09-12 NOTE — PROGRESS NOTES
[place] for Sexual and Gender Health - Progress Note    Date of Service: 24   Name: Ramin Murcia  : 1955  Medical Record Number: [id number]  Treating Provider: Mason Adkins, PhD  Type of Session: Couple  Present in Session: Client, client's wife Edwards, therapist  Session Start and Stop Time: 11:05am-12:00pm  Number of Minutes:  55    SERVICE MODALITY:  In-person    DSM-5 Diagnoses:  Encounter Diagnoses   Name Primary?    Unspecified disruptive, impulse control, and conduct disorder (hypersexuality) Yes    Anxiety disorder, unspecified type     Alcohol use          Current Reported Symptoms and Status update:  Changes since last session includes feelings of antsiness or restlessness, relational and communication challenges, no boundary violations, increased urges, increased alcohol use.    Progress Toward Treatment Goals:   Satisfactory progress     Therapeutic Interventions/Treatment Strategies:    Area(s) of treatment focus addressed in this session included Interpersonal Relationship Skills    Client and Edwards discussed concerns around current relationship functioning, intimacy, communication, and client's alcohol use. We identified patterns of interactions and defenses that emerge. Edwards identified wanting to feel that client is interested in her and cares about her. We discussed stopping a formulaic approach to interactions and expressing care and finding ways to connect with each other's desires to be loved and valued.    Psychotherapist offered support, feedback and validation and reinforced use of skills Treatment modalities used include Cognitive Behavioral Therapy  Support and Feedback    Patient Response:   Patient responded to session by accepting feedback, giving feedback, listening, focusing on goals, and accepting support  Possible barriers to participation / learning include: N/A    Current Mental Status Exam:   Appearance:  Appropriate   Eye Contact:  Good   Attitude /  Demeanor: Friendly  Speech      Rate / Production: Normal/ Responsive      Volume:  Normal  volume  Orientation:  All  Mood:   Normal  Affect:   Appropriate   Thought Content: Clear   Insight:   Good         Plan/Need for Future Services:  Return for therapy in 2 weeks to treat diagnosed problems.  Continue weekly group.  Patient has a current master individualized treatment plan.  See Epic treatment plan for more information.    Referral / Collaboration:  Referral to another professional/service is not indicated at this time..  Emergency Services Needed?  No    Assignment:  Ways of expressing love/care to each other    Interactive Complexity:  There are four specific communication difficulties that complicate the work of the primary psychiatric procedure.  Interactive complexity (+22042) may be reported when at least one of these difficulties is present.    Communication difficulties present during current the psychiatric procedure include:  None.      Signature/Title:    Mason Adkins, PhD, LP    [place] for Sexual and Gender Health, Sexual and Gender Health Clinic  Department of Family Medicine and Community Health  [facility] [employer]

## 2024-09-26 ENCOUNTER — OFFICE VISIT (OUTPATIENT)
Dept: PSYCHOLOGY | Facility: CLINIC | Age: 69
End: 2024-09-26
Payer: COMMERCIAL

## 2024-09-26 DIAGNOSIS — F91.8 CONDUCT DISORDER, UNDIFFERENTIATED TYPE: Primary | ICD-10-CM

## 2024-09-26 DIAGNOSIS — Z78.9 ALCOHOL USE: ICD-10-CM

## 2024-09-26 DIAGNOSIS — F41.9 ANXIETY DISORDER, UNSPECIFIED TYPE: ICD-10-CM

## 2024-09-26 NOTE — GROUP NOTE
[facility]  [address], [address]  [place]  [address]    Group Progress Note  [facility] - Progress Note    Date of Service: 24   Name: Ramin Murcia  : 1955  Medical Record Number: [id number]  START TIME:  4:00 PM  END TIME:  6:00 PM  FACILITATOR(S): Mason Adkins, PhD; Nithya Lopez, PhD  TOPIC: [facility] Group Therapy  Type of Session: Group  :  Number of Attendees:  4  Number of Minutes:  120    SERVICE MODALITY:  In-person    Medical Student Allison Leopold attended with group consent      DSM-5 Diagnoses:  Encounter Diagnoses   Name Primary?    Unspecified disruptive, impulse control, and conduct disorder (hypersexuality) Yes    Anxiety disorder, unspecified type     Alcohol use          Current Reported Symptoms and Status update:  Changes since last session includes no boundary violations, his first grandchild was born this week and he has had a lot of support and connection with others, noticing increased positive urgency, improvement in anxiety. Negative effects of alcohol use on relationship functioning.    Patient's problems with out of control, driven, impulsive, compulsive sexual behavior began in early . He has been in a stable loving marriage for 40 years then began having covert hook-ups with young men.  Efforts to change problematic behaviors have included several years of psychotherapy and attenance in [facility]. He continues to struggle with recurrent and intense urges which cause him distress.     Client reports experiencing some anxiety/worry and associated distress. He attributes distress to ineffective coping, patterns of avoidance/numbing, and difficulties with functioning in relationships.    Progress Toward Treatment Goals:   Satisfactory progress     Therapeutic Interventions/Treatment Strategies:    Area(s) of treatment focus addressed in this session included Symptom Management, Interpersonal Relationship Skills,  and Sexual Health and Wellness    CBT and Interpersonal interventions were implemented in group in support of members' progress. Members shared current status of mental health, coping, sexual health, boundary maintenance, and relationship functioning. The group was supported by therapists to provide each other constructive and supportive feedback. Client reflected on recent celebrations and alcohol use. He processed on conflict with his wife related to alcohol use. He highlighted progress he has made in his capacity to discuss concerns.   Psychotherapist offered support, feedback and validation and reinforced use of skills Treatment modalities used include [facility] THERAPY INTERVENTIONS: Cognitive Behavioral Therapy  Support and Feedback    Patient Response:   Patient responded to session by accepting feedback, giving feedback, listening, focusing on goals, and accepting support  Possible barriers to participation / learning include: N/A    Current Mental Status Exam:   Appearance:  Appropriate   Eye Contact:  Good   Attitude / Demeanor: Friendly  Speech      Rate / Production: Normal/ Responsive      Volume:  Normal  volume  Orientation:  All  Mood:   Normal  Affect:   Appropriate   Thought Content: Clear   Insight:   Good         Plan/Need for Future Services:  Return for therapy in 1 week to treat diagnosed problems.    Patient has a current master individualized treatment plan.  See Epic treatment plan for more information.    Referral / Collaboration:  Referral to another professional/service is not indicated at this time..  Emergency Services Needed?  No    Assignment:  Consider boundaries for alcohol use.    Interactive Complexity:  There are four specific communication difficulties that complicate the work of the primary psychiatric procedure.  Interactive complexity (+98456) may be reported when at least one of these difficulties is present.    Communication difficulties present during current the psychiatric  procedure include:  None.      Signature/Title:      Mason Adkins, PhD, LP    [place] for Sexual and Gender Health, Sexual and Gender Health Clinic  Department of Family Medicine and Community Health  [facility]

## 2024-10-03 ENCOUNTER — OFFICE VISIT (OUTPATIENT)
Dept: PSYCHOLOGY | Facility: CLINIC | Age: 69
End: 2024-10-03
Payer: COMMERCIAL

## 2024-10-03 DIAGNOSIS — F41.9 ANXIETY DISORDER, UNSPECIFIED TYPE: ICD-10-CM

## 2024-10-03 DIAGNOSIS — Z78.9 ALCOHOL USE: ICD-10-CM

## 2024-10-03 DIAGNOSIS — F91.8 CONDUCT DISORDER, UNDIFFERENTIATED TYPE: Primary | ICD-10-CM

## 2024-10-03 PROCEDURE — 90853 GROUP PSYCHOTHERAPY: CPT | Performed by: STUDENT IN AN ORGANIZED HEALTH CARE EDUCATION/TRAINING PROGRAM

## 2024-10-03 NOTE — GROUP NOTE
[facility]  [address], [address]  [place]  [address]    Group Progress Note  [facility] - Progress Note    Date of Service: 10/03/24   Name: Ramin Murcia  : 1955  Medical Record Number: [id number]  START TIME:  4:00 PM  END TIME:  6:00 PM  FACILITATOR(S): Mason Adkins, PhD; Nithya Lopez, PhD  TOPIC: [facility] Group Therapy  Type of Session: Group  :  Number of Attendees:  6  Number of Minutes:  120    SERVICE MODALITY:  In-person      DSM-5 Diagnoses:  Encounter Diagnoses   Name Primary?    Unspecified disruptive, impulse control, and conduct disorder (hypersexuality) Yes    Anxiety disorder, unspecified type     Alcohol use          Current Reported Symptoms and Status update:  Changes since last session includes no boundary violations despite high risk situations, depression/anxiety managed with treatment, considering alcohol related boundaries.    Patient's problems with out of control, driven, impulsive, compulsive sexual behavior began in early . He has been in a stable loving marriage for 40 years then began having covert hook-ups with young men.  Efforts to change problematic behaviors have included several years of psychotherapy and attenance in [facility]. He continues to struggle with recurrent and intense urges which cause him distress.     Client reports experiencing some anxiety/worry and associated distress. He attributes distress to ineffective coping, patterns of avoidance/numbing, and difficulties with functioning in relationships.    Progress Toward Treatment Goals:   Satisfactory progress     Therapeutic Interventions/Treatment Strategies:    Area(s) of treatment focus addressed in this session included Symptom Management, Interpersonal Relationship Skills, and Sexual Health and Wellness    Group members introduced themselves to new group member. The group also shared group rules/norms, including privacy/confidentiality,  logistics, and weekly attendance. They shared their history, compulsive sexual behavior concerns, and treatment progress. Clients also checked in with progress on assignments, self-care, coping, and sexual health. Members were encouraged to provide each other supportive feedback.  Client shared ways in which he is working on amends with his wife, which include working on his relationship with alcohol. He reflected on ways in which he is trying to work on moderation. He shared ways in which alcohol relates to his CSB.  Psychotherapist offered support, feedback and validation and reinforced use of skills Treatment modalities used include [facility] THERAPY INTERVENTIONS: Cognitive Behavioral Therapy  Support and Feedback    Patient Response:   Patient responded to session by accepting feedback, giving feedback, listening, focusing on goals, and accepting support  Possible barriers to participation / learning include: N/A    Current Mental Status Exam:   Appearance:  Appropriate   Eye Contact:  Good   Attitude / Demeanor: Friendly  Speech      Rate / Production: Normal/ Responsive      Volume:  Normal  volume  Orientation:  All  Mood:   Normal  Affect:   Appropriate   Thought Content: Clear   Insight:   Good       Plan/Need for Future Services:  Return for therapy in 1 week to treat diagnosed problems.    Patient has a current master individualized treatment plan.  See Epic treatment plan for more information.    Referral / Collaboration:  Referral to another professional/service is not indicated at this time..  Emergency Services Needed?  No    Assignment:  Continue defining boundaries with alcohol    Interactive Complexity:  There are four specific communication difficulties that complicate the work of the primary psychiatric procedure.  Interactive complexity (+37238) may be reported when at least one of these difficulties is present.    Communication difficulties present during current the psychiatric procedure  include:  None.      Signature/Title:    Mason Adkins, PhD, LP    [place] [facility], [facility]  Department of Family Medicine and Community Health  [facility]

## 2024-10-07 ENCOUNTER — OFFICE VISIT (OUTPATIENT)
Dept: PSYCHOLOGY | Facility: CLINIC | Age: 69
End: 2024-10-07
Payer: COMMERCIAL

## 2024-10-07 DIAGNOSIS — F91.8 CONDUCT DISORDER, UNDIFFERENTIATED TYPE: Primary | ICD-10-CM

## 2024-10-07 DIAGNOSIS — F41.9 ANXIETY DISORDER, UNSPECIFIED TYPE: ICD-10-CM

## 2024-10-07 DIAGNOSIS — Z78.9 ALCOHOL USE: ICD-10-CM

## 2024-10-07 PROCEDURE — 90834 PSYTX W PT 45 MINUTES: CPT | Performed by: STUDENT IN AN ORGANIZED HEALTH CARE EDUCATION/TRAINING PROGRAM

## 2024-10-07 NOTE — PROGRESS NOTES
[place] [facility] - Progress Note    Date of Service: 10/07/24   Name: Ramin Murcia  : 1955  Medical Record Number: [id number]  Treating Provider: Mason Adkins, PhD  Type of Session: Individual  Present in Session: Client and therapist  Session Start and Stop Time: 11:02am-11:40am  Number of Minutes:  38    SERVICE MODALITY:  In-person    DSM-5 Diagnoses:  Encounter Diagnoses   Name Primary?    Unspecified disruptive, impulse control, and conduct disorder (hypersexuality) Yes    Anxiety disorder, unspecified type     Alcohol use          Current Reported Symptoms and Status update:  Changes since last session includes no boundary violations, reduced sexual compulsivity, anxiety under good control, reducing alcohol use, plans to continue reduced alcohol use.    Patient's problems with out of control, driven, impulsive, compulsive sexual behavior began in early . He has been in a stable loving marriage for 40 years then began having covert hook-ups with young men.  Efforts to change problematic behaviors have included several years of psychotherapy and attenance in [facility]. He continues to struggle with recurrent and intense urges which cause him distress.     Client reports experiencing some anxiety/worry and associated distress. He attributes distress to ineffective coping, patterns of avoidance/numbing, and difficulties with functioning in relationships.    Progress Toward Treatment Goals:   Satisfactory progress     Therapeutic Interventions/Treatment Strategies:    Area(s) of treatment focus addressed in this session included Symptom Management and Sexual Health and Wellness    Client reflected on progress he has made in treatment. He shared completing the sexual health inventory. We reviewed plans to share what he learned from the inventory with group. Client and therapist discussed potential plans for moving towards treatment completion. Client identified wanting to have alcohol  use under good control and communication with is wife improved/stable. We planned to invite his wife to a future session to review treatment progress, feedback, and wrapping up treatment.     Psychotherapist offered support, feedback and validation and reinforced use of skills Treatment modalities used include Cognitive Behavioral Therapy  Support and Feedback    Patient Response:   Patient responded to session by accepting feedback, giving feedback, listening, focusing on goals, and accepting support  Possible barriers to participation / learning include: N/A    Current Mental Status Exam:   Appearance:  Appropriate   Eye Contact:  Good   Attitude / Demeanor: Friendly  Speech      Rate / Production: Normal/ Responsive      Volume:  Normal  volume  Orientation:  All  Mood:   Normal  Affect:   Appropriate   Thought Content: Clear   Insight:   Good       Plan/Need for Future Services:  Return for therapy in 2 weeks to treat diagnosed problems. Continue weekly treatment group.  Patient has a current master individualized treatment plan.  See Epic treatment plan for more information.    Referral / Collaboration:  Referral to another professional/service is not indicated at this time..  Emergency Services Needed?  No      Assignment:  Share sexual health inventory with group    Interactive Complexity:  There are four specific communication difficulties that complicate the work of the primary psychiatric procedure.  Interactive complexity (+22198) may be reported when at least one of these difficulties is present.    Communication difficulties present during current the psychiatric procedure include:  None.      Signature/Title:    Mason Adkins, PhD, LP    [place] [facility], Sexual [facility]  Department of Family Medicine and Community Health  [employer]

## 2024-10-10 ENCOUNTER — OFFICE VISIT (OUTPATIENT)
Dept: PSYCHOLOGY | Facility: CLINIC | Age: 69
End: 2024-10-10
Payer: COMMERCIAL

## 2024-10-10 DIAGNOSIS — F41.9 ANXIETY DISORDER, UNSPECIFIED TYPE: ICD-10-CM

## 2024-10-10 DIAGNOSIS — Z78.9 ALCOHOL USE: ICD-10-CM

## 2024-10-10 DIAGNOSIS — F91.8 CONDUCT DISORDER, UNDIFFERENTIATED TYPE: Primary | ICD-10-CM

## 2024-10-10 PROCEDURE — 90853 GROUP PSYCHOTHERAPY: CPT | Performed by: STUDENT IN AN ORGANIZED HEALTH CARE EDUCATION/TRAINING PROGRAM

## 2024-10-10 NOTE — GROUP NOTE
[facility]  [address], [address]  [place]  [address]    Group Progress Note  [facility] - Progress Note    Date of Service: 10/10/24   Name: Ramin Murcia  : 1955  Medical Record Number: [id number]  START TIME:  4:00 PM  END TIME:  6:00 PM  FACILITATOR(S): Mason Adkins, PhD; Nithya Lopez, PhD  TOPIC: [facility] Group Therapy  Type of Session: Group  :  Number of Attendees:  6  Number of Minutes:  120    SERVICE MODALITY:  In-person      DSM-5 Diagnoses:  Encounter Diagnoses   Name Primary?    Unspecified disruptive, impulse control, and conduct disorder (hypersexuality) Yes    Anxiety disorder, unspecified type     Alcohol use          Current Reported Symptoms and Status update:  Changes since last session includes no boundary violations, working on alcohol use goals, improvement in anxiety.    Patient's problems with out of control, driven, impulsive, compulsive sexual behavior began in early . He has been in a stable loving marriage for 40 years then began having covert hook-ups with young men.  Efforts to change problematic behaviors have included several years of psychotherapy and attenance in [facility]. He continues to struggle with recurrent and intense urges which cause him distress.     Client reports experiencing some anxiety/worry and associated distress. He attributes distress to ineffective coping, patterns of avoidance/numbing, and difficulties with functioning in relationships.    Progress Toward Treatment Goals:   Satisfactory progress     Therapeutic Interventions/Treatment Strategies:    Area(s) of treatment focus addressed in this session included Symptom Management, Interpersonal Relationship Skills, and Sexual Health and Wellness    CBT approaches were implemented to facilitate group intervention. Members shared updates on self-care, sexual health, mental health, and treatment goals. Members shared assignments they are  working on and were supported to provide each other constructive feedback. We focused on negative and positive cycles. Client discussed hopes/plans for wrapping up therapy group. He shared progress he is making on his sexual health inventory.  Psychotherapist offered support, feedback and validation and reinforced use of skills Treatment modalities used include [facility] THERAPY INTERVENTIONS: Cognitive Behavioral Therapy  Support and Feedback    Patient Response:   Patient responded to session by accepting feedback, giving feedback, listening, focusing on goals, and accepting support  Possible barriers to participation / learning include: N/A    Current Mental Status Exam:   Appearance:  Appropriate   Eye Contact:  Good   Attitude / Demeanor: Friendly  Speech      Rate / Production: Normal/ Responsive      Volume:  Normal  volume  Orientation:  All  Mood:   Normal  Affect:   Appropriate   Thought Content: Clear   Insight:   Good         Plan/Need for Future Services:  Return for therapy in 1 week to treat diagnosed problems.    Patient has a current master individualized treatment plan.  See Epic treatment plan for more information.    Referral / Collaboration:  Referral to another professional/service is not indicated at this time..  Emergency Services Needed?  No    Assignment:  Wrap up sexual health inventory    Interactive Complexity:  There are four specific communication difficulties that complicate the work of the primary psychiatric procedure.  Interactive complexity (+95489) may be reported when at least one of these difficulties is present.    Communication difficulties present during current the psychiatric procedure include:  None.      Signature/Title:      Mason Adkins, PhD, LP    [place] [facility], Sexual and Gender Health Clinic  Department of Family Medicine and Community Health  [employer] [facility] [employer]

## 2024-10-17 ENCOUNTER — OFFICE VISIT (OUTPATIENT)
Dept: PSYCHOLOGY | Facility: CLINIC | Age: 69
End: 2024-10-17
Payer: COMMERCIAL

## 2024-10-17 DIAGNOSIS — F91.8 CONDUCT DISORDER, UNDIFFERENTIATED TYPE: Primary | ICD-10-CM

## 2024-10-17 DIAGNOSIS — Z78.9 ALCOHOL USE: ICD-10-CM

## 2024-10-17 DIAGNOSIS — F41.9 ANXIETY DISORDER, UNSPECIFIED TYPE: ICD-10-CM

## 2024-10-17 PROCEDURE — 90853 GROUP PSYCHOTHERAPY: CPT | Performed by: STUDENT IN AN ORGANIZED HEALTH CARE EDUCATION/TRAINING PROGRAM

## 2024-10-17 NOTE — GROUP NOTE
[facility]  [address], [address]  [place]  [address]    Group Progress Note  [facility] - Progress Note    Date of Service: 10/17/24   Name: Ramin Murcia  : 1955  Medical Record Number: [id number]  START TIME:  4:00 PM  END TIME:  6:00 PM  FACILITATOR(S): Mason Adkins, PhD; Nithya Lopez, PhD  TOPIC: [facility] Group Therapy  Type of Session: Group  :  Number of Attendees:  5  Number of Minutes:  120    SERVICE MODALITY:  In-person    Medical student Rosi Singh shadowed group.      DSM-5 Diagnoses:  Encounter Diagnoses   Name Primary?    Unspecified disruptive, impulse control, and conduct disorder (hypersexuality) Yes    Anxiety disorder, unspecified type     Alcohol use          Current Reported Symptoms and Status update:  Changes since last session includes no boundary violations, some urges and risk situations; challenges with alcohol use; anxiety managed with treatment.    Patient's problems with out of control, driven, impulsive, compulsive sexual behavior began in early . He has been in a stable loving marriage for 40 years then began having covert hook-ups with young men.  Efforts to change problematic behaviors have included several years of psychotherapy and attenance in [facility]. He continues to struggle with recurrent and intense urges which cause him distress.     Client reports experiencing some anxiety/worry and associated distress. He attributes distress to ineffective coping, patterns of avoidance/numbing, and difficulties with functioning in relationships.    Progress Toward Treatment Goals:   Satisfactory progress     Therapeutic Interventions/Treatment Strategies:    Area(s) of treatment focus addressed in this session included Symptom Management, Interpersonal Relationship Skills, and Sexual Health and Wellness    Group members provided updates on mental health, relational and sexual health, coping, and treatment  progress. Members introduced themselves to medical student Rosi Singh who observed group today. Members shared assignment progress and how assignments connect to treatment goals. They also provided each other supportive, constructive feedback. Client shared his comprehensive negative and positive cycles. He connected his treatment progress to better understanding his cycles and practicing mindfulness. He was receptive to feedback and provided supportive input to others.  Psychotherapist offered support, feedback and validation and reinforced use of skills Treatment modalities used include [facility] THERAPY INTERVENTIONS: Cognitive Behavioral Therapy  Support, Feedback, Clarification, and Education    Patient Response:   Patient responded to session by accepting feedback, giving feedback, listening, focusing on goals, and accepting support  Possible barriers to participation / learning include: N/A    Current Mental Status Exam:   Appearance:  Appropriate   Eye Contact:  Good   Attitude / Demeanor: Friendly  Speech      Rate / Production: Normal/ Responsive      Volume:  Normal  volume  Orientation:  All  Mood:   Normal  Affect:   Appropriate   Thought Content: Clear   Insight:   Good       Plan/Need for Future Services:  Return for therapy in 1 week to treat diagnosed problems.    Patient has a current master individualized treatment plan.  See Epic treatment plan for more information.    Referral / Collaboration:  Referral to another professional/service is not indicated at this time..  Emergency Services Needed?  No    Assignment:  Finish sexual health inventory    Interactive Complexity:  There are four specific communication difficulties that complicate the work of the primary psychiatric procedure.  Interactive complexity (+15443) may be reported when at least one of these difficulties is present.    Communication difficulties present during current the psychiatric procedure  include:  None.      Signature/Title:    Mason Adkins, PhD, LP    [place] [facility] Sexual [facility], Sexual and Gender Health Clinic  Department of Family Medicine and Community Health  [facility]

## 2024-10-21 ENCOUNTER — OFFICE VISIT (OUTPATIENT)
Dept: PSYCHOLOGY | Facility: CLINIC | Age: 69
End: 2024-10-21
Payer: COMMERCIAL

## 2024-10-21 DIAGNOSIS — F91.8 CONDUCT DISORDER, UNDIFFERENTIATED TYPE: Primary | ICD-10-CM

## 2024-10-21 DIAGNOSIS — F41.9 ANXIETY DISORDER, UNSPECIFIED TYPE: ICD-10-CM

## 2024-10-21 DIAGNOSIS — Z78.9 ALCOHOL USE: ICD-10-CM

## 2024-10-21 PROCEDURE — 90847 FAMILY PSYTX W/PT 50 MIN: CPT | Performed by: STUDENT IN AN ORGANIZED HEALTH CARE EDUCATION/TRAINING PROGRAM
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
## 2024-10-24 ENCOUNTER — OFFICE VISIT (OUTPATIENT)
Dept: PSYCHOLOGY | Facility: CLINIC | Age: 69
End: 2024-10-24
Payer: COMMERCIAL

## 2024-10-24 DIAGNOSIS — Z78.9 ALCOHOL USE: ICD-10-CM

## 2024-10-24 DIAGNOSIS — F91.8 CONDUCT DISORDER, UNDIFFERENTIATED TYPE: Primary | ICD-10-CM

## 2024-10-24 DIAGNOSIS — F41.9 ANXIETY DISORDER, UNSPECIFIED TYPE: ICD-10-CM

## 2024-10-24 PROCEDURE — 90853 GROUP PSYCHOTHERAPY: CPT | Performed by: STUDENT IN AN ORGANIZED HEALTH CARE EDUCATION/TRAINING PROGRAM
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
## 2024-10-31 ENCOUNTER — OFFICE VISIT (OUTPATIENT)
Dept: PSYCHOLOGY | Facility: CLINIC | Age: 69
End: 2024-10-31
Payer: COMMERCIAL

## 2024-10-31 DIAGNOSIS — F41.9 ANXIETY DISORDER, UNSPECIFIED TYPE: ICD-10-CM

## 2024-10-31 DIAGNOSIS — Z78.9 ALCOHOL USE: ICD-10-CM

## 2024-10-31 DIAGNOSIS — F91.8 CONDUCT DISORDER, UNDIFFERENTIATED TYPE: Primary | ICD-10-CM

## 2024-10-31 PROCEDURE — 90853 GROUP PSYCHOTHERAPY: CPT | Performed by: STUDENT IN AN ORGANIZED HEALTH CARE EDUCATION/TRAINING PROGRAM

## 2024-10-31 NOTE — GROUP NOTE
[facility]  [address], [address]  [place]  [address]    Group Progress Note  [facility] - Progress Note    Date of Service: 10/31/24   Name: Ramin Murcia  : 1955  Medical Record Number: [id number]  START TIME:  4:00 PM  END TIME:  6:00 PM  FACILITATOR(S): Mason Adkins, PhD  TOPIC: [facility] Group Therapy  Type of Session: Individual  Number of Minutes:  120    SERVICE MODALITY:  In-person      DSM-5 Diagnoses:  Encounter Diagnoses   Name Primary?    Unspecified disruptive, impulse control, and conduct disorder (hypersexuality) Yes    Anxiety disorder, unspecified type     Alcohol use      Current Reported Symptoms and Status update:  Changes since last session includes no boundary violations, some anxiety/worry, reduced alcohol use.    Patient's problems with out of control, driven, impulsive, compulsive sexual behavior began in early . He has been in a stable loving marriage for 40 years then began having covert hook-ups with young men.  Efforts to change problematic behaviors have included several years of psychotherapy and attenance in [facility]. He continues to struggle with recurrent and intense urges which cause him distress.     Client reports experiencing some anxiety/worry and associated distress. He attributes distress to ineffective coping, patterns of avoidance/numbing, and difficulties with functioning in relationships.    Progress Toward Treatment Goals:   Satisfactory progress     Therapeutic Interventions/Treatment Strategies:    Area(s) of treatment focus addressed in this session included Symptom Management, Interpersonal Relationship Skills, and Sexual Health and Wellness    Group members provided updates on sexual, mental, and relationship health. They shared ways in which they are intervening on negative cycles of compulsive behavior and coping more effectively. Members shared updates in group homework. They were encouraged to  provide each other supportive, interpersonal feedback. Client shared how mindfulness/meditation has had a positive impact on his functioning and relationship. He connected with others in group and reflected on how he has grown through treatment. He was an active participant in group.  Psychotherapist offered support, feedback and validation and reinforced use of skills Treatment modalities used include [facility] THERAPY INTERVENTIONS: Cognitive Behavioral Therapy  Support and Feedback    Patient Response:   Patient responded to session by accepting feedback, giving feedback, listening, focusing on goals, and accepting support  Possible barriers to participation / learning include: N/A    Current Mental Status Exam:   Appearance:  Appropriate   Eye Contact:  Good   Attitude / Demeanor: Friendly  Speech      Rate / Production: Normal/ Responsive      Volume:  Normal  volume  Orientation:  All  Mood:   Normal  Affect:   Appropriate   Thought Content: Clear   Insight:   Good     Plan/Need for Future Services:  Return for therapy in 1 week to treat diagnosed problems.    Patient has a current master individualized treatment plan.  See Epic treatment plan for more information.    Referral / Collaboration:  Referral to another professional/service is not indicated at this time..  Emergency Services Needed?  No    Assignment:  Finish sexual health inventory    Interactive Complexity:  There are four specific communication difficulties that complicate the work of the primary psychiatric procedure.  Interactive complexity (+15797) may be reported when at least one of these difficulties is present.    Communication difficulties present during current the psychiatric procedure include:  None.      Signature/Title:      Mason Adkins, PhD, LP    [facility], [facility]  Department of Family Medicine and Community Health  [facility]

## 2024-11-14 ENCOUNTER — OFFICE VISIT (OUTPATIENT)
Dept: PSYCHOLOGY | Facility: CLINIC | Age: 69
End: 2024-11-14
Payer: COMMERCIAL

## 2024-11-14 DIAGNOSIS — F91.8 CONDUCT DISORDER, UNDIFFERENTIATED TYPE: Primary | ICD-10-CM

## 2024-11-14 PROCEDURE — 90853 GROUP PSYCHOTHERAPY: CPT | Mod: 4UV

## 2024-11-15 NOTE — PROGRESS NOTES
[facility]  [address], [address]  [place]  [address]    Group Progress Note  [facility] - Progress Note    Date of Service: 24   Name: Ramin Murcia  : 1955  Medical Record Number: [id number]  START TIME: 4:00  END TIME: 6:00  FACILITATOR(S): Nithya Lopez  TOPIC: Group Therapy      DSM-5 Diagnoses:  Encounter Diagnosis   Name Primary?    Unspecified disruptive, impulse control, and conduct disorder (hypersexuality) Yes       Current Reported Symptoms and Status update:  Changes since last session includes no boundary violations, sustained reduced alcohol use.    Patient's problems with out of control, driven, impulsive, compulsive sexual behavior began in early . He has been in a stable loving marriage for 40 years then began having covert hook-ups with young men.  Efforts to change problematic behaviors have included several years of psychotherapy and attenance in [facility]. He continues to struggle with recurrent and intense urges which cause him distress.     Client reports experiencing some anxiety/worry and associated distress. He attributes distress to ineffective coping, patterns of avoidance/numbing, and difficulties with functioning in relationships.    Progress Toward Treatment Goals:   Satisfactory progress     Therapeutic Interventions/Treatment Strategies:    Area(s) of treatment focus addressed in this session included Symptom Management, Interpersonal Relationship Skills, and Sexual Health and Wellness    Group members provided updates on sexual, mental, and relationship health. They shared ways in which they are intervening on negative cycles of compulsive behavior and coping more effectively. Members shared updates in group homework. They were encouraged to provide each other supportive, interpersonal feedback. Client shared how mindfulness has had a positive impact on his functioning and relationship and shared a recent  example in which he used mindfulness to improve communication with his wife. He connected with others in group and reflected on how he has grown through treatment. He was an active participant in group.  Psychotherapist offered support, feedback and validation and reinforced use of skills Treatment modalities used include [facility] THERAPY INTERVENTIONS: Cognitive Behavioral Therapy  Support and Feedback    Patient Response:   Patient responded to session by accepting feedback, giving feedback, listening, focusing on goals, and accepting support  Possible barriers to participation / learning include: N/A    Current Mental Status Exam:   Appearance:  Appropriate   Eye Contact:  Good   Attitude / Demeanor: Friendly  Speech      Rate / Production: Normal/ Responsive      Volume:  Normal  volume  Orientation:  All  Mood:   Normal  Affect:   Appropriate   Thought Content: Clear   Insight:   Good     Plan/Need for Future Services:  Return for therapy in 1 week to treat diagnosed problems.    Patient has a current master individualized treatment plan.  See Epic treatment plan for more information.    Referral / Collaboration:  Referral to another professional/service is not indicated at this time..  Emergency Services Needed?  No    Assignment:  Finish sexual health inventory    Interactive Complexity:  There are four specific communication difficulties that complicate the work of the primary psychiatric procedure.  Interactive complexity (+01672) may be reported when at least one of these difficulties is present.    Communication difficulties present during current the psychiatric procedure include:  None.      Signature/Title:      Nithya Lopez, PhD

## 2024-11-21 ENCOUNTER — OFFICE VISIT (OUTPATIENT)
Dept: PSYCHOLOGY | Facility: CLINIC | Age: 69
End: 2024-11-21
Payer: COMMERCIAL

## 2024-11-21 DIAGNOSIS — F91.8 CONDUCT DISORDER, UNDIFFERENTIATED TYPE: ICD-10-CM
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
## 2024-12-05 ENCOUNTER — OFFICE VISIT (OUTPATIENT)
Dept: PSYCHOLOGY | Facility: CLINIC | Age: 69
End: 2024-12-05
Payer: COMMERCIAL

## 2024-12-05 DIAGNOSIS — Z78.9 ALCOHOL USE: ICD-10-CM

## 2024-12-05 DIAGNOSIS — F91.8 CONDUCT DISORDER, UNDIFFERENTIATED TYPE: Primary | ICD-10-CM

## 2024-12-05 DIAGNOSIS — F41.9 ANXIETY DISORDER, UNSPECIFIED TYPE: ICD-10-CM

## 2024-12-05 NOTE — GROUP NOTE
[facility]  [address], [address]  [place]  [address]    Group Progress Note  [facility] - Progress Note    Date of Service: 24   Name: Ramin Murcia  : 1955  Medical Record Number: [id number]  START TIME:  4:00 PM  END TIME:  6:00 PM  FACILITATOR(S): Mason Adkins, PhD; Nithya Lopez, PhD  TOPIC: [facility] Group Therapy  Type of Session: Group  :  Number of Attendees:  6  Number of Minutes:  120    SERVICE MODALITY:  In-person      DSM-5 Diagnoses:  Encounter Diagnoses   Name Primary?    Unspecified disruptive, impulse control, and conduct disorder (hypersexuality) Yes    Anxiety disorder, unspecified type     Alcohol use          Current Reported Symptoms and Status update:  Changes since last session includes no boundary violations, some risk situations, anxiety managed with treatment, alcohol use and borderline problematic.    Patient's problems with out of control, driven, impulsive, compulsive sexual behavior began in early . He has been in a stable loving marriage for 40 years then began having covert hook-ups with young men.  Efforts to change problematic behaviors have included several years of psychotherapy and attenance in [facility]. He continues to struggle with recurrent and intense urges which cause him distress.     Client reports experiencing some anxiety/worry and associated distress. He attributes distress to ineffective coping, patterns of avoidance/numbing, and difficulties with functioning in relationships.    Progress Toward Treatment Goals:   Satisfactory progress     Therapeutic Interventions/Treatment Strategies:    Area(s) of treatment focus addressed in this session included Symptom Management, Interpersonal Relationship Skills, and Sexual Health and Wellness    Group members provided updates about sexual health, mental health, relationship functioning, self-care, and treatment progress. Members processed  relationship dynamics and conflicts. They provided each other supportive feedback and practiced identifying and expressing emotions. Members also discussed sexual health goals, pleasure, and how to work towards a positive sexual self. Client shared ways in which he is working to communicate more effectively with his wife. He discussed how his CSB patterns also included pleasure and reflected on emotions related to his sexual history.  Psychotherapist offered support, feedback and validation and reinforced use of skills Treatment modalities used include [facility] THERAPY INTERVENTIONS: Cognitive Behavioral Therapy  Support and Feedback    Patient Response:   Patient responded to session by accepting feedback, giving feedback, listening, focusing on goals, and accepting support  Possible barriers to participation / learning include: N/A    Current Mental Status Exam:   Appearance:  Appropriate   Eye Contact:  Good   Attitude / Demeanor: Friendly  Speech      Rate / Production: Normal/ Responsive      Volume:  Normal  volume  Orientation:  All  Mood:   Normal  Affect:   Appropriate   Thought Content: Clear   Insight:   Good         Plan/Need for Future Services:  Return for therapy in 1 week to treat diagnosed problems.    Patient has a current master individualized treatment plan.  See Epic treatment plan for more information.    Referral / Collaboration:  Referral to another professional/service is not indicated at this time..  Emergency Services Needed?  No    Assignment:  Continue self care and communication practice    Interactive Complexity:  There are four specific communication difficulties that complicate the work of the primary psychiatric procedure.  Interactive complexity (+03943) may be reported when at least one of these difficulties is present.    Communication difficulties present during current the psychiatric procedure include:  None.      Signature/Title:      Mason Adkins, PhD, LP  Assistant  Professor  [facility], [facility]  Department of Family Medicine and Community Health  [facility]

## 2024-12-11 ENCOUNTER — OFFICE VISIT (OUTPATIENT)
Dept: PSYCHOLOGY | Facility: CLINIC | Age: 69
End: 2024-12-11
Payer: COMMERCIAL

## 2024-12-11 DIAGNOSIS — Z78.9 ALCOHOL USE: ICD-10-CM

## 2024-12-11 DIAGNOSIS — F91.8 CONDUCT DISORDER, UNDIFFERENTIATED TYPE: Primary | ICD-10-CM

## 2024-12-11 DIAGNOSIS — F41.9 ANXIETY DISORDER, UNSPECIFIED TYPE: ICD-10-CM

## 2024-12-11 NOTE — PROGRESS NOTES
[facility] - Progress Note    Date of Service: 24   Name: Ramin Murcia  : 1955  Medical Record Number: [id number]  Treating Provider: Mason Adkins, PhD  Type of Session: Individual  Present in Session: Client and therapist  Session Start and Stop Time: 4:01pm-4:50pm  Number of Minutes:  49    SERVICE MODALITY:  In-person    DSM-5 Diagnoses:  Encounter Diagnoses   Name Primary?    Unspecified disruptive, impulse control, and conduct disorder (hypersexuality) Yes    Anxiety disorder, unspecified type     Alcohol use          Current Reported Symptoms and Status update:  Changes since last session includes no boundary violations, behavior aligned with values, working on relationship and intimacy repair, anxiety managed with treatment, continuing to explore needs to align alcohol use with goals.     Patient's problems with out of control, driven, impulsive, compulsive sexual behavior began in early . He has been in a stable loving marriage for 40 years then began having covert hook-ups with young men.  Efforts to change problematic behaviors have included several years of psychotherapy and attenance in [facility]. He continues to struggle with recurrent and intense urges which cause him distress.     Client reports experiencing some anxiety/worry and associated distress. He attributes distress to ineffective coping, patterns of avoidance/numbing, and difficulties with functioning in relationships.    Progress Toward Treatment Goals:   Satisfactory progress     Therapeutic Interventions/Treatment Strategies:    Area(s) of treatment focus addressed in this session included Symptom Management, Interpersonal Relationship Skills, and Sexual Health and Wellness    Client processed recent relationship dynamics. We discussed ways in which sex meets needs for validation and self-worth. We identified ways he could develop that validation outside of partnered sexual experiences. We also  discussed solitary sexual experiences as meaningful and supportive of sexual health. We reviewed plans to reduce level of care in approx March. We reviewed plans to finalize homework, include his wife in plans and assessment for couples therapy, and change pursuer/withdrawer dynamics. We also discussed client's personality traits and how he can find environments/work/connection that allow traits to be engaged effectively.     Psychotherapist offered support, feedback and validation and reinforced use of skills Treatment modalities used include Cognitive Behavioral Therapy  Support and Feedback    Patient Response:   Patient responded to session by accepting feedback, giving feedback, listening, focusing on goals, and accepting support  Possible barriers to participation / learning include: N/A    Current Mental Status Exam:   Appearance:  Appropriate   Eye Contact:  Good   Attitude / Demeanor: Friendly  Speech      Rate / Production: Normal/ Responsive      Volume:  Normal  volume  Orientation:  All  Mood:   Normal  Affect:   Appropriate   Thought Content: Clear   Insight:   Good       Plan/Need for Future Services:  Return for therapy in 2 weeks to treat diagnosed problems. Continue weekly treatment group.  Patient has a current master individualized treatment plan.  See Epic treatment plan for more information.    Referral / Collaboration:  Referral to another professional/service is not indicated at this time..  Emergency Services Needed?  No      Assignment:  Sexual health inventory  Plan to start maintenance plan in January    Interactive Complexity:  There are four specific communication difficulties that complicate the work of the primary psychiatric procedure.  Interactive complexity (+47039) may be reported when at least one of these difficulties is present.    Communication difficulties present during current the psychiatric procedure include:  None.        Mason Adkins, PhD, LP  Assistant  Professor  [facility], Sexual and [facility] Health [facility]  Department of Family Medicine and Community Health  [facility]

## 2024-12-12 ENCOUNTER — OFFICE VISIT (OUTPATIENT)
Dept: PSYCHOLOGY | Facility: CLINIC | Age: 69
End: 2024-12-12
Payer: COMMERCIAL

## 2024-12-12 DIAGNOSIS — Z78.9 ALCOHOL USE: ICD-10-CM

## 2024-12-12 DIAGNOSIS — F91.8 CONDUCT DISORDER, UNDIFFERENTIATED TYPE: Primary | ICD-10-CM

## 2024-12-12 DIAGNOSIS — F41.9 ANXIETY DISORDER, UNSPECIFIED TYPE: ICD-10-CM

## 2024-12-12 NOTE — GROUP NOTE
[facility]  [address], [address]  [place]  [address]    Group Progress Note  [facility] - Progress Note    Date of Service: 24   Name: Ramin Murcia  : 1955  Medical Record Number: [id number]  START TIME:  4:00 PM  END TIME:  6:00 PM  FACILITATOR(S): Mason Adkins, PhD; Nithya Lopez, PhD  TOPIC: [facility] Group Therapy  Type of Session: Group  :  Number of Attendees:  5  Number of Minutes:  120    SERVICE MODALITY:  In-person      DSM-5 Diagnoses:  Encounter Diagnoses   Name Primary?    Unspecified disruptive, impulse control, and conduct disorder (hypersexuality) Yes    Anxiety disorder, unspecified type     Alcohol use          Current Reported Symptoms and Status update:  Changes since last session includes no boundary violations, working to reduce risk situations, improvement in anxiety, contemplating change for alcohol use.      Patient's problems with out of control, driven, impulsive, compulsive sexual behavior began in early . He has been in a stable loving marriage for 40 years then began having covert hook-ups with young men.  Efforts to change problematic behaviors have included several years of psychotherapy and attenance in [facility]. He continues to struggle with recurrent and intense urges which cause him distress.     Client reports experiencing some anxiety/worry and associated distress. He attributes distress to ineffective coping, patterns of avoidance/numbing, and difficulties with functioning in relationships.  Progress Toward Treatment Goals:   Satisfactory progress     Therapeutic Interventions/Treatment Strategies:    Area(s) of treatment focus addressed in this session included Symptom Management, Interpersonal Relationship Skills, and Sexual Health and Wellness    Medical student Francia Dyer observed with group permission. Members provided brief introductions and current treatment progress to Francia. Members  shared updates with sexual health, mental health, coping, and self-care. Members discussed assignment progress with each other and were encouraged to provide supportive feedback. Client shared progress on his sexual health inventory. He also shared how this helped him develop a more cohesive sense of self.   Psychotherapist offered support, feedback and validation and reinforced use of skills Treatment modalities used include [facility] THERAPY INTERVENTIONS: Cognitive Behavioral Therapy  Support, Feedback, and Clarification    Patient Response:   Patient responded to session by accepting feedback, giving feedback, listening, focusing on goals, and accepting support  Possible barriers to participation / learning include: N/A    Current Mental Status Exam:   Appearance:  Appropriate   Eye Contact:  Good   Attitude / Demeanor: Friendly  Speech      Rate / Production: Normal/ Responsive      Volume:  Normal  volume  Orientation:  All  Mood:   Normal  Affect:   Appropriate   Thought Content: Clear   Insight:   Good       Plan/Need for Future Services:  Return for therapy in 1 week to treat diagnosed problems.    Patient has a current master individualized treatment plan.  See Epic treatment plan for more information.    Referral / Collaboration:  Referral to another professional/service is not indicated at this time..  Emergency Services Needed?  No    Assignment:  Continue sharing sexual health inventory    Interactive Complexity:  There are four specific communication difficulties that complicate the work of the primary psychiatric procedure.  Interactive complexity (+61635) may be reported when at least one of these difficulties is present.    Communication difficulties present during current the psychiatric procedure include:  None.      Mason Adkins, PhD, LP    [place] [facility], Sexual [facility] Gender Health Clinic  Department of Family Medicine and [facility]  [facility]

## 2024-12-19 ENCOUNTER — OFFICE VISIT (OUTPATIENT)
Dept: PSYCHOLOGY | Facility: CLINIC | Age: 69
End: 2024-12-19
Payer: COMMERCIAL

## 2024-12-19 DIAGNOSIS — Z78.9 ALCOHOL USE: ICD-10-CM

## 2024-12-19 DIAGNOSIS — F91.8 CONDUCT DISORDER, UNDIFFERENTIATED TYPE: Primary | ICD-10-CM

## 2024-12-19 DIAGNOSIS — F41.9 ANXIETY DISORDER, UNSPECIFIED TYPE: ICD-10-CM

## 2024-12-19 NOTE — GROUP NOTE
[facility]  [address], [address]  [place]  [address]    Group Progress Note  [facility] - Progress Note    Date of Service: 24   Name: Ramin Murcia  : 1955  Medical Record Number: [id number]  START TIME:  4:00 PM  END TIME:  6:00 PM  FACILITATOR(S): Mason Adkins, PhD; Nithya Lopez, PhD  TOPIC: [facility] Group Therapy  Type of Session: Group  :  Number of Attendees:  6  Number of Minutes:  120    Medical Student Francia Dyer attended with group consent    SERVICE MODALITY:  Video Visit:      Provider verified identity through the following two step process.  Patient provided:  Patient is known previously to provider and Patient was verified at admission/transfer    Telemedicine Visit: The patient's condition can be safely assessed and treated via synchronous audio and visual telemedicine encounter.      Reason for Telemedicine Visit: Patient has requested telehealth visit    Originating Site (Patient Location): Patient's home    Distant Site (Provider Location): [facility]    Consent:  The patient/guardian has verbally consented to: the potential risks and benefits of telemedicine (video visit) versus in person care; bill my insurance or make self-payment for services provided; and responsibility for payment of non-covered services.     Patient would like the video invitation sent by:  My Chart    Mode of Communication:  Video Conference via [facility]    Distant Location (Provider):  On-site    As the provider I attest to compliance with applicable laws and regulations related to telemedicine.      DSM-5 Diagnoses:  Encounter Diagnoses   Name Primary?     Unspecified disruptive, impulse control, and conduct disorder (hypersexuality) Yes     Anxiety disorder, unspecified type      Alcohol use          Current Reported Symptoms and Status update:  Changes since last session includes no boundary  violations, working on sexual health goals, improvement in anxiety, some alcohol use.    Patient's problems with out of control, driven, impulsive, compulsive sexual behavior began in early 2020. He has been in a stable loving marriage for 40 years then began having covert hook-ups with young men.  Efforts to change problematic behaviors have included several years of psychotherapy and attenance in [facility]. He continues to struggle with recurrent and intense urges which cause him distress.     Client reports experiencing some anxiety/worry and associated distress. He attributes distress to ineffective coping, patterns of avoidance/numbing, and difficulties with functioning in relationships.    Progress Toward Treatment Goals:   Satisfactory progress     Therapeutic Interventions/Treatment Strategies:    Area(s) of treatment focus addressed in this session included Symptom Management, Interpersonal Relationship Skills, and Sexual Health and Wellness    Although group was scheduled in-person, we met via Zoom due to weather concerns. Group members shared progress on sexual and mental health treatment goals. Members discussed homework progress, self-care, and sexual and relationship functioning. Members provided supportive feedback to each other. Client shared how he is mindful of potential risk factors for CSB during the holidays. He also shared how he and his wife are working collaboratively and trying to improve communication.   Psychotherapist offered support, feedback and validation and reinforced use of skills Treatment modalities used include [facility] THERAPY INTERVENTIONS: Cognitive Behavioral Therapy  Support and Feedback    Patient Response:   Patient responded to session by accepting feedback, giving feedback, listening, focusing on goals, and accepting support  Possible barriers to participation / learning include: N/A    Current Mental Status Exam:   Appearance:  Appropriate   Eye Contact:  Good   Attitude /  Demeanor: Friendly  Speech      Rate / Production: Normal/ Responsive      Volume:  Normal  volume  Orientation:  All  Mood:   Normal  Affect:   Appropriate   Thought Content: Clear   Insight:   Good     Plan/Need for Future Services:  Return for therapy in 2 weeks to treat diagnosed problems. Continue weekly treatment group.  Patient has a current master individualized treatment plan.  See Epic treatment plan for more information.    Referral / Collaboration:  Referral to another professional/service is not indicated at this time..  Emergency Services Needed?  No      Assignment:  Continue sexual health inventory    Interactive Complexity:  There are four specific communication difficulties that complicate the work of the primary psychiatric procedure.  Interactive complexity (+34648) may be reported when at least one of these difficulties is present.    Communication difficulties present during current the psychiatric procedure include:  None.      Signature/Title:    Mason Adkins, PhD, LP    [place] [facility] Sexual and Gender [facility], Sexual and Gender Health Clinic  Department of Family Medicine and Community Health  [employer]

## 2025-01-16 ENCOUNTER — OFFICE VISIT (OUTPATIENT)
Dept: PSYCHOLOGY | Facility: CLINIC | Age: 70
End: 2025-01-16
Payer: COMMERCIAL

## 2025-01-16 DIAGNOSIS — F41.9 ANXIETY DISORDER, UNSPECIFIED TYPE: ICD-10-CM

## 2025-01-16 DIAGNOSIS — Z78.9 ALCOHOL USE: ICD-10-CM

## 2025-01-16 DIAGNOSIS — F91.8 CONDUCT DISORDER, UNDIFFERENTIATED TYPE: Primary | ICD-10-CM

## 2025-01-16 NOTE — GROUP NOTE
[facility]  [address], [address]  [place]  [address]    Group Progress Note  Gender [facility] Sexual Health Clinic - Progress Note    Date of Service: 25   Name: Ramin Murcia  : 1955  Medical Record Number: [id number]  START TIME:  4:00 PM  END TIME:  6:00 PM  FACILITATOR(S): Mason Adkins, PhD; Nithya Lopez, PhD  TOPIC: [facility] Group Therapy  Type of Session: Group  :  Number of Attendees:  5  Number of Minutes:  120    SERVICE MODALITY:  In-person    Medical student Kasia Nichols joined with group consent      DSM-5 Diagnoses:  Encounter Diagnoses   Name Primary?    Unspecified disruptive, impulse control, and conduct disorder (hypersexuality) Yes    Anxiety disorder, unspecified type     Alcohol use          Current Reported Symptoms and Status update:  Changes since last session includes no boundary violations, anxiety managed with treatment, reduced alcohol use.    Patient's problems with out of control, driven, impulsive, compulsive sexual behavior began in early . He has been in a stable loving marriage for 40 years then began having covert hook-ups with young men.  Efforts to change problematic behaviors have included several years of psychotherapy and attenance in [facility]. He continues to struggle with recurrent and intense urges which cause him distress.     Client reports experiencing some anxiety/worry and associated distress. He attributes distress to ineffective coping, patterns of avoidance/numbing, and difficulties with functioning in relationships.    Progress Toward Treatment Goals:   Satisfactory progress     Therapeutic Interventions/Treatment Strategies:    Area(s) of treatment focus addressed in this session included Symptom Management, Interpersonal Relationship Skills, and Sexual Health and Wellness    Group members shared progress towards mental and sexual health goals. Members also discussed positive changes in treatment. They shared  updates on homework/group work and reflected on what they have learned through treatment. Members were encouraged to provide each other supportive feedback. Client shared progress he has made on the sexual health inventory and what he is learning or crystallizing about his sexuality and sexual health. He reflected on change he has made in treatment. He was an active group participant. We also planned client's tentative group completion date.  Psychotherapist offered support, feedback and validation and reinforced use of skills Treatment modalities used include [facility] THERAPY INTERVENTIONS: Cognitive Behavioral Therapy  Support and Feedback    Patient Response:   Patient responded to session by accepting feedback, giving feedback, listening, focusing on goals, and accepting support  Possible barriers to participation / learning include: N/A    Current Mental Status Exam:   Appearance:  Appropriate   Eye Contact:  Good   Attitude / Demeanor: Friendly  Speech      Rate / Production: Normal/ Responsive      Volume:  Normal  volume  Orientation:  All  Mood:   Normal  Affect:   Appropriate   Thought Content: Clear   Insight:   Good         Plan/Need for Future Services:  Return for therapy in 1 week to treat diagnosed problems.    Patient has a current master individualized treatment plan.  See Epic treatment plan for more information.    Referral / Collaboration:  Referral to another professional/service is not indicated at this time..  Emergency Services Needed?  No    Assignment:  Maintenance plan    Interactive Complexity:  There are four specific communication difficulties that complicate the work of the primary psychiatric procedure.  Interactive complexity (+02638) may be reported when at least one of these difficulties is present.    Communication difficulties present during current the psychiatric procedure include:  None.      Signature/Title:      Mason Adkins, PhD, LP    [place] for  Sexual and Gender Health, Sexual and Gender Health Clinic  Department of Family Medicine and Community Health  [facility]

## 2025-01-16 NOTE — PROGRESS NOTES
[place] for Sexual and Gender Health - Progress Note    Date of Service: 25   Name: Ramin Murcia  : 1955  Medical Record Number: [id number]  Treating Provider: Mason Adkins, PhD  Type of Session: Individual  Present in Session: Client, therapist, medical student Kasia Nichols with client consent.  Session Start and Stop Time: 9:00am-9:40am  Number of Minutes:  40    SERVICE MODALITY:  In-person    DSM-5 Diagnoses:  Encounter Diagnoses   Name Primary?    Unspecified disruptive, impulse control, and conduct disorder (hypersexuality) Yes    Anxiety disorder, unspecified type     Alcohol use          Current Reported Symptoms and Status update:  Changes since last session includes maintaining sexual and relationship health goals, no compulsivity, anxiety improved with treatment, some alcohol use and continuing to work on changes he wants to make.    Patient's problems with out of control, driven, impulsive, compulsive sexual behavior began in early . He has been in a stable loving marriage for 40 years then began having covert hook-ups with young men.  Efforts to change problematic behaviors have included several years of psychotherapy and attenance in [facility]. He continues to struggle with recurrent and intense urges which cause him distress.     Client reports experiencing some anxiety/worry and associated distress. He attributes distress to ineffective coping, patterns of avoidance/numbing, and difficulties with functioning in relationships.    Progress Toward Treatment Goals:   Satisfactory progress     Therapeutic Interventions/Treatment Strategies:    Area(s) of treatment focus addressed in this session included Symptom Management, Interpersonal Relationship Skills, and Sexual Health and Wellness    Medical student Kasia Nichols joined with client consent. Client reflected on positive change he has made in treatment. He shared examples from the past month of how he has made changes and positive  effects in his relationship. We reviewed plans for tapering down treatment around March. Shared the maintenance planning activity with client. He plans to review the sexual health inventory in group.     Psychotherapist offered support, feedback and validation and reinforced use of skills Treatment modalities used include Cognitive Behavioral Therapy  Support and Feedback    Patient Response:   Patient responded to session by accepting feedback, giving feedback, listening, focusing on goals, and accepting support  Possible barriers to participation / learning include: N/A    Current Mental Status Exam:   Appearance:  Appropriate   Eye Contact:  Good   Attitude / Demeanor: Friendly  Speech      Rate / Production: Normal/ Responsive      Volume:  Normal  volume  Orientation:  All  Mood:   Normal  Affect:   Appropriate   Thought Content: Clear   Insight:   Good       Plan/Need for Future Services:  Return for therapy in 2 weeks to treat diagnosed problems. Continue weekly treatment group.  Patient has a current master individualized treatment plan.  See Epic treatment plan for more information.    Referral / Collaboration:  Referral to another professional/service is not indicated at this time..  Emergency Services Needed?  No      Assignment:  Maintenance plan    Interactive Complexity:  There are four specific communication difficulties that complicate the work of the primary psychiatric procedure.  Interactive complexity (+69254) may be reported when at least one of these difficulties is present.    Communication difficulties present during current the psychiatric procedure include:  None.      Signature/Title:    Mason Adkins, PhD, LP    [place] for Sexual and Gender Health, Sexual and Gender Health Clinic  Department of Family Medicine and Community Health  [employer]

## 2025-01-23 ENCOUNTER — OFFICE VISIT (OUTPATIENT)
Dept: PSYCHOLOGY | Facility: CLINIC | Age: 70
End: 2025-01-23
Payer: COMMERCIAL

## 2025-01-23 DIAGNOSIS — F91.8 CONDUCT DISORDER, UNDIFFERENTIATED TYPE: Primary | ICD-10-CM

## 2025-01-23 DIAGNOSIS — Z78.9 ALCOHOL USE: ICD-10-CM

## 2025-01-23 DIAGNOSIS — F41.9 ANXIETY DISORDER, UNSPECIFIED TYPE: ICD-10-CM

## 2025-01-23 NOTE — GROUP NOTE
Gender [facility] Clinic  [address], [address]  [place]  [address]    Group Progress Note  Gender [facility] Sexual Health Clinic - Progress Note    Date of Service: 25   Name: Ramin Murcia  : 1955  Medical Record Number: [id number]  START TIME:  4:00 PM  END TIME:  6:00 PM  FACILITATOR(S): Mason Adkins, PhD; Nithya Lopez, PhD  TOPIC: [facility] Group Therapy  Type of Session: Group  :  Number of Attendees:  5  Number of Minutes:  120    SERVICE MODALITY:  In-person      DSM-5 Diagnoses:  Encounter Diagnoses   Name Primary?    Unspecified disruptive, impulse control, and conduct disorder (hypersexuality) Yes    Anxiety disorder, unspecified type     Alcohol use          Current Reported Symptoms and Status update:  Changes since last session includes no boundary violations, starting his maintenance plan, improvement in anxiety but feeling antsy, plans to stop alcohol use.    Patient's problems with out of control, driven, impulsive, compulsive sexual behavior began in early . He has been in a stable loving marriage for 40 years then began having covert hook-ups with young men.  Efforts to change problematic behaviors have included several years of psychotherapy and attenance in [facility]. He continues to struggle with recurrent and intense urges which cause him distress.     Client reports experiencing some anxiety/worry and associated distress. He attributes distress to ineffective coping, patterns of avoidance/numbing, and difficulties with functioning in relationships.    Progress Toward Treatment Goals:   Satisfactory progress     Therapeutic Interventions/Treatment Strategies:    Area(s) of treatment focus addressed in this session included Symptom Management, Interpersonal Relationship Skills, and Sexual Health and Wellness    Group members shared updates about stress, mental health, coping, sexual health, and relationship functioning. Members also shared progress on goals  and homework completion. We discussed all-or-nothing thinking and differences between concepts of  addiction  and  compulsivity.  Members were encouraged to share supportive feedback with others. Client shared that he plans to stop using alcohol. He also shared further progress on his sexual health inventory. He was an active participant in group.  Psychotherapist offered support, feedback and validation and reinforced use of skills Treatment modalities used include [facility] THERAPY INTERVENTIONS: Cognitive Behavioral Therapy  Support and Feedback    Patient Response:   Patient responded to session by accepting feedback, giving feedback, listening, focusing on goals, and accepting support  Possible barriers to participation / learning include: N/A    Current Mental Status Exam:   Appearance:  Appropriate   Eye Contact:  Good   Attitude / Demeanor: Friendly  Speech      Rate / Production: Normal/ Responsive      Volume:  Normal  volume  Orientation:  All  Mood:   Normal  Affect:   Appropriate   Thought Content: Clear   Insight:   Good     Plan/Need for Future Services:  Return for therapy in 1 week to treat diagnosed problems.    Patient has a current master individualized treatment plan.  See Epic treatment plan for more information.    Referral / Collaboration:  Referral to another professional/service is not indicated at this time..  Emergency Services Needed?  No    Assignment:  Maintenance plan    Interactive Complexity:  There are four specific communication difficulties that complicate the work of the primary psychiatric procedure.  Interactive complexity (+[id number]) may be reported when at least one of these difficulties is present.    Communication difficulties present during current the psychiatric procedure include:  None.      Signature/Title:      Mason Adkins, PhD, LP    [place] for Sexual and Gender Health, Sexual and Gender Health Clinic  Department of [facility] and [facility]  [facility]

## 2025-01-29 ENCOUNTER — OFFICE VISIT (OUTPATIENT)
Dept: PSYCHOLOGY | Facility: CLINIC | Age: 70
End: 2025-01-29
Payer: COMMERCIAL

## 2025-01-29 DIAGNOSIS — F91.8 CONDUCT DISORDER, UNDIFFERENTIATED TYPE: Primary | ICD-10-CM

## 2025-01-29 DIAGNOSIS — F41.9 ANXIETY DISORDER, UNSPECIFIED TYPE: ICD-10-CM

## 2025-01-29 DIAGNOSIS — Z78.9 ALCOHOL USE: ICD-10-CM

## 2025-01-29 NOTE — PROGRESS NOTES
[place] [facility] - Progress Note    Date of Service: 25   Name: Ramin Murcia  : 1955  Medical Record Number: [id number]  Treating Provider: Mason Adkins, PhD  Type of Session: Couple  Present in Session: Client, client's wife [place], and therapist  Session Start and Stop Time: 1:02pm-1:55pm  Number of Minutes:  53    SERVICE MODALITY:  In-person    DSM-5 Diagnoses:  Encounter Diagnoses   Name Primary?    Unspecified disruptive, impulse control, and conduct disorder (hypersexuality) Yes    Anxiety disorder, unspecified type     Alcohol use          Current Reported Symptoms and Status update:  Changes since last session includes maintaining treatment progress on compulsive sexual behavior, stopped alcohol use, improvement in anxiety.    Patient's problems with out of control, driven, impulsive, compulsive sexual behavior began in early . He has been in a stable loving marriage for 40 years then began having covert hook-ups with young men.  Efforts to change problematic behaviors have included several years of psychotherapy and attenance in [facility]. He continues to struggle with recurrent and intense urges which cause him distress.     Client reports experiencing some anxiety/worry and associated distress. He attributes distress to ineffective coping, patterns of avoidance/numbing, and difficulties with functioning in relationships.    Progress Toward Treatment Goals:   Satisfactory progress     Therapeutic Interventions/Treatment Strategies:    Area(s) of treatment focus addressed in this session included Symptom Management, Interpersonal Relationship Skills, and Sexual Health and Wellness    Client and his wife attended today's session to review client's treatment progress and plan for client to end group. Both reviewed positive changes in the relationship dynamic, including due to client stopping alcohol use recently. Both addressed concerns in the sexual dynamic, addressing  desire discrepancy, [place]'s history of pain, and client's compulsivity. We discussed ways in which they can continue to connect over feelings and goals, including focusing on emotional experiences related to client's compulsive behavior.     Psychotherapist offered support, feedback and validation and reinforced use of skills Treatment modalities used include Cognitive Behavioral Therapy  Support and Feedback    Patient Response:   Patient responded to session by accepting feedback, giving feedback, listening, focusing on goals, and accepting support  Possible barriers to participation / learning include: N/A    Current Mental Status Exam:   Appearance:  Appropriate   Eye Contact:  Good   Attitude / Demeanor: Friendly  Speech      Rate / Production: Normal/ Responsive      Volume:  Normal  volume  Orientation:  All  Mood:   Normal  Affect:   Appropriate   Thought Content: Clear   Insight:   Good     Plan/Need for Future Services:  Return for therapy in 2 weeks to treat diagnosed problems. Continue weekly treatment group.  Patient has a current master individualized treatment plan.  See Epic treatment plan for more information.    Referral / Collaboration:  Referral to another professional/service is not indicated at this time..  Emergency Services Needed?  No      Assignment:  Maintenance plan    Interactive Complexity:  There are four specific communication difficulties that complicate the work of the primary psychiatric procedure.  Interactive complexity (+14786) may be reported when at least one of these difficulties is present.    Communication difficulties present during current the psychiatric procedure include:  None.      Signature/Title:    Mason Adkins, PhD, LP    [facility] [facility], Sexual and Gender Health Clinic  Department of Family Medicine and Community Health  [employer] [facility] [employer]

## 2025-02-06 ENCOUNTER — OFFICE VISIT (OUTPATIENT)
Dept: PSYCHOLOGY | Facility: CLINIC | Age: 70
End: 2025-02-06
Payer: COMMERCIAL

## 2025-02-06 DIAGNOSIS — F41.9 ANXIETY DISORDER, UNSPECIFIED TYPE: ICD-10-CM

## 2025-02-06 DIAGNOSIS — Z78.9 ALCOHOL USE: ICD-10-CM

## 2025-02-06 DIAGNOSIS — F91.8 CONDUCT DISORDER, UNDIFFERENTIATED TYPE: Primary | ICD-10-CM

## 2025-02-06 NOTE — GROUP NOTE
Gender and Sexual Health Clinic  [address], [address]  [place]  [address]    Group Progress Note  Gender [facility] Clinic - Progress Note    Date of Service: 25   Name: Ramin Murcia  : 1955  Medical Record Number: [id number]  START TIME:  4:00 PM  END TIME:  6:00 PM  FACILITATOR(S): Mason Adkins, PhD; Nithya Lopez, PhD  TOPIC: [facility] Group Therapy  Type of Session: Group  :  Number of Attendees:  5  Number of Minutes:  120    SERVICE MODALITY:  In-person    Medical student Mellisa Ruffin joined with group consent.      DSM-5 Diagnoses:  Encounter Diagnoses   Name Primary?    Unspecified disruptive, impulse control, and conduct disorder (hypersexuality) Yes    Anxiety disorder, unspecified type     Alcohol use      Current Reported Symptoms and Status update:  Changes since last session includes maintained treatment progress, no compulsive behavior, improvement in anxiety, significantly reduced alcohol use (1x in two weeks).    Patient's problems with out of control, driven, impulsive, compulsive sexual behavior began in early . He has been in a stable loving marriage for 40 years then began having covert hook-ups with young men.  Efforts to change problematic behaviors have included several years of psychotherapy and attenance in [facility]. He continues to struggle with recurrent and intense urges which cause him distress.     Client reports experiencing some anxiety/worry and associated distress. He attributes distress to ineffective coping, patterns of avoidance/numbing, and difficulties with functioning in relationships.    Progress Toward Treatment Goals:   Satisfactory progress     Therapeutic Interventions/Treatment Strategies:    Area(s) of treatment focus addressed in this session included Symptom Management, Interpersonal Relationship Skills, and Sexual Health and Wellness    Clients introduced themselves to medical student and reflected on their treatment  progress. Members shared updated on their sexual health, mental health, and relationship goals. They reviewed coping and self-care and discussed challenges with the current sociopolitical climate. Members shared progress towards group/treatment assignments. Client reflected on work he needs to do in his relationship and steps he and his wife are taking to repair their relationship. He also shared ways in which he is trying to engage with her more. He was an active participant in group.  Psychotherapist offered support, feedback and validation and reinforced use of skills Treatment modalities used include [facility] THERAPY INTERVENTIONS: Cognitive Behavioral Therapy  Support and Feedback    Patient Response:   Patient responded to session by accepting feedback, giving feedback, listening, focusing on goals, and accepting support  Possible barriers to participation / learning include: N/A    Current Mental Status Exam:   Appearance:  Appropriate   Eye Contact:  Good   Attitude / Demeanor: Friendly  Speech      Rate / Production: Normal/ Responsive      Volume:  Normal  volume  Orientation:  All  Mood:   Normal  Affect:   Appropriate   Thought Content: Clear   Insight:   Good     Plan/Need for Future Services:  Return for therapy in 2 weeks to treat diagnosed problems. Continue weekly treatment group.  Patient has a current master individualized treatment plan.  See Epic treatment plan for more information.    Referral / Collaboration:  Referral to another professional/service is not indicated at this time..  Emergency Services Needed?  No      Assignment:  Maintenance plan    Interactive Complexity:  There are four specific communication difficulties that complicate the work of the primary psychiatric procedure.  Interactive complexity (+96230) may be reported when at least one of these difficulties is present.    Communication difficulties present during current the psychiatric procedure  include:  None.      Signature/Title:      Mason Adkins, PhD, LP    [place] [facility], Sexual and Gender Health Clinic  Department of Family Medicine and Community Health  [facility]

## 2025-02-13 ENCOUNTER — OFFICE VISIT (OUTPATIENT)
Dept: PSYCHOLOGY | Facility: CLINIC | Age: 70
End: 2025-02-13
Payer: COMMERCIAL

## 2025-02-13 DIAGNOSIS — Z78.9 ALCOHOL USE: ICD-10-CM

## 2025-02-13 DIAGNOSIS — F41.9 ANXIETY DISORDER, UNSPECIFIED TYPE: ICD-10-CM

## 2025-02-13 DIAGNOSIS — F91.8 CONDUCT DISORDER, UNDIFFERENTIATED TYPE: Primary | ICD-10-CM

## 2025-02-13 NOTE — GROUP NOTE
Gender and Sexual Health Clinic  [address], [address]  [place]  [address]    Group Progress Note  Gender and Sexual Health Clinic - Progress Note    Date of Service: 25   Name: Ramin Murcia  : 1955  Medical Record Number: [id number]  START TIME:  4:00 PM  END TIME:  6:00 PM  FACILITATOR(S): Mason Adkins, PhD; Nithya Lopez, PhD  TOPIC: [facility] Group Therapy  Type of Session: Group  :  Number of Attendees:  5  Number of Minutes:  120    SERVICE MODALITY:  In-person    Medical student Cuauhtemoc Ruffin observed with group consent.      DSM-5 Diagnoses:  Encounter Diagnoses   Name Primary?    Unspecified disruptive, impulse control, and conduct disorder (hypersexuality) Yes    Anxiety disorder, unspecified type     Alcohol use          Current Reported Symptoms and Status update:  Changes since last session includes no boundary violations, maintaining self-control, improvement in anxiety, recent sobriety from alcohol.    Patient's problems with out of control, driven, impulsive, compulsive sexual behavior began in early . He has been in a stable loving marriage for 40 years then began having covert hook-ups with young men.  Efforts to change problematic behaviors have included several years of psychotherapy and attenance in [facility]. He continues to struggle with recurrent and intense urges which cause him distress.     Client reports experiencing some anxiety/worry and associated distress. He attributes distress to ineffective coping, patterns of avoidance/numbing, and difficulties with functioning in relationships.    Progress Toward Treatment Goals:   Satisfactory progress     Therapeutic Interventions/Treatment Strategies:    Area(s) of treatment focus addressed in this session included Symptom Management, Interpersonal Relationship Skills, and Sexual Health and Wellness    Group members shared updates on self-care, effective coping, self-control, and sexual and mental health  goals. Members also discussed challenges to progress and risk situations. Members provided progress on treatment-related assignments. They were supported to provide each other constructive feedback. Client shared his maintenance plan in preparation to wrap up treatment in a few weeks. He identified protective and risk factors for treatment progress. He was responsive to group feedback and was an active participant.  Psychotherapist offered support, feedback and validation and reinforced use of skills Treatment modalities used include [facility] THERAPY INTERVENTIONS: Cognitive Behavioral Therapy  Support and Feedback    Patient Response:   Patient responded to session by accepting feedback, giving feedback, listening, focusing on goals, and accepting support  Possible barriers to participation / learning include: N/A    Current Mental Status Exam:   Appearance:  Appropriate   Eye Contact:  Good   Attitude / Demeanor: Friendly  Speech      Rate / Production: Normal/ Responsive      Volume:  Normal  volume  Orientation:  All  Mood:   Normal  Affect:   Appropriate   Thought Content: Clear   Insight:   Good         Plan/Need for Future Services:  Return for therapy in 1 week to treat diagnosed problems.    Patient has a current master individualized treatment plan.  See Epic treatment plan for more information.    Referral / Collaboration:  Referral to another professional/service is not indicated at this time..  Emergency Services Needed?  No    Assignment:  Continue maintenance plan    Interactive Complexity:  There are four specific communication difficulties that complicate the work of the primary psychiatric procedure.  Interactive complexity (+48676) may be reported when at least one of these difficulties is present.    Communication difficulties present during current the psychiatric procedure include:  None.      Signature/Title:    Mason Adkins, PhD, LP    [place] for Sexual [facility] Gender [facility],  Sexual and Gender Health Clinic  Department of Family Medicine and Community Health  [facility]

## 2025-02-14 DIAGNOSIS — R35.0 BENIGN PROSTATIC HYPERPLASIA WITH URINARY FREQUENCY: ICD-10-CM

## 2025-02-14 DIAGNOSIS — N40.1 BENIGN PROSTATIC HYPERPLASIA WITH URINARY FREQUENCY: ICD-10-CM

## 2025-02-19 RX ORDER — TADALAFIL 5 MG/1
5 TABLET ORAL DAILY
Qty: 90 TABLET | Refills: 2 | OUTPATIENT
Start: 2025-02-19

## 2025-02-19 NOTE — TELEPHONE ENCOUNTER
Dup: pharm called    tadalafil (CIALIS) 5 MG tablet   90 tablet 2 7/10/2024 -- No  Sig - Route: Take 1 tablet (5 mg) by mouth daily - Oral

## 2025-02-26 ENCOUNTER — OFFICE VISIT (OUTPATIENT)
Dept: PSYCHOLOGY | Facility: CLINIC | Age: 70
End: 2025-02-26
Payer: COMMERCIAL

## 2025-02-26 DIAGNOSIS — Z78.9 ALCOHOL USE: ICD-10-CM

## 2025-02-26 DIAGNOSIS — F41.9 ANXIETY DISORDER, UNSPECIFIED TYPE: ICD-10-CM

## 2025-02-26 DIAGNOSIS — F91.8 CONDUCT DISORDER, UNDIFFERENTIATED TYPE: Primary | ICD-10-CM

## 2025-02-26 NOTE — PROGRESS NOTES
Sexual and Gender Health Clinic - Progress Note    Date of Service: 25   Name: Ramin Murcia  : 1955  Medical Record Number: [id number]  Treating Provider: Mason Adkins, PhD  Type of Session: Individual  Present in Session: Client and therapist  Session Start and Stop Time: 1:03pm-1:30pm  Number of Minutes:  27    SERVICE MODALITY:  In-person    DSM-5 Diagnoses:  Encounter Diagnoses   Name Primary?    Unspecified disruptive, impulse control, and conduct disorder (hypersexuality) Yes    Anxiety disorder, unspecified type     Alcohol use          Current Reported Symptoms and Status update:  Changes since last session includes maintained progress on sexual health goals, reduced alcohol use, improvement in anxiety. Planning to end current treatment.    Patient's problems with out of control, driven, impulsive, compulsive sexual behavior began in early . He has been in a stable loving marriage for 40 years then began having covert hook-ups with young men.  Efforts to change problematic behaviors have included several years of psychotherapy and attenance in [facility]. He continues to struggle with recurrent and intense urges which cause him distress.     Client reports experiencing some anxiety/worry and associated distress. He attributes distress to ineffective coping, patterns of avoidance/numbing, and difficulties with functioning in relationships.    Progress Toward Treatment Goals:   Satisfactory progress     Therapeutic Interventions/Treatment Strategies:    Area(s) of treatment focus addressed in this session included Symptom Management, Interpersonal Relationship Skills, and Sexual Health and Wellness    Client reflected on progress towards treatment goals and plans to transition to aftercare. He noted positive aspects of treatment contributing to his success, including the program structure and feedback from therapist. We discussed plans for aftercare, including monthly check-ins the next few  months to ensure maintenance of treatment goals. He will attend group this week and end group next week.    Psychotherapist offered support, feedback and validation and reinforced use of skills Treatment modalities used include Cognitive Behavioral Therapy  Support and Feedback    Patient Response:   Patient responded to session by accepting feedback, giving feedback, listening, focusing on goals, and accepting support  Possible barriers to participation / learning include: N/A    Current Mental Status Exam:   Appearance:  Appropriate   Eye Contact:  Good   Attitude / Demeanor: Friendly  Speech      Rate / Production: Normal/ Responsive      Volume:  Normal  volume  Orientation:  All  Mood:   Normal  Affect:   Appropriate   Thought Content: Clear   Insight:   Good     Plan/Need for Future Services:  Return for therapy in 2 weeks to treat diagnosed problems. Continue weekly treatment group.  Patient has a current master individualized treatment plan.  See Epic treatment plan for more information.    Referral / Collaboration:  Referral to another professional/service is not indicated at this time..  Emergency Services Needed?  No      Assignment:  Wrap-up group    Interactive Complexity:  There are four specific communication difficulties that complicate the work of the primary psychiatric procedure.  Interactive complexity (+39361) may be reported when at least one of these difficulties is present.    Communication difficulties present during current the psychiatric procedure include:  None.      Signature/Title:    Mason Adkins, PhD, LP    [place] for Sexual and Gender Health, Sexual and Gender Health Clinic  Department of Family Medicine and Community Health  [employer]

## 2025-02-27 ENCOUNTER — OFFICE VISIT (OUTPATIENT)
Dept: PSYCHOLOGY | Facility: CLINIC | Age: 70
End: 2025-02-27
Payer: COMMERCIAL

## 2025-02-27 DIAGNOSIS — F41.9 ANXIETY DISORDER, UNSPECIFIED TYPE: ICD-10-CM

## 2025-02-27 DIAGNOSIS — Z78.9 ALCOHOL USE: ICD-10-CM

## 2025-02-27 DIAGNOSIS — F91.8 CONDUCT DISORDER, UNDIFFERENTIATED TYPE: Primary | ICD-10-CM

## 2025-02-27 NOTE — GROUP NOTE
Gender and Sexual Health Clinic  [address], [address]  [place]  [address]    Group Progress Note  Gender and Sexual Health Clinic - Progress Note    Date of Service: 25   Name: Ramin Murcia  : 1955  Medical Record Number: [id number]  START TIME:  4:00 PM  END TIME:  5:30 PM  FACILITATOR(S): Mason Adkins, PhD; Nithya Lopez, PhD  TOPIC: HC Group Therapy  Type of Session: Group  :  Number of Attendees:  4  Number of Minutes:  90  SERVICE MODALITY:  In-person    Marriage and family therapy student Mil Torres observed with group consent      DSM-5 Diagnoses:  Encounter Diagnoses   Name Primary?    Unspecified disruptive, impulse control, and conduct disorder (hypersexuality) Yes    Anxiety disorder, unspecified type     Alcohol use        Current Reported Symptoms and Status update:  Changes since last session includes no boundary violations, plans to end therapy group next week, working on alcohol use goals, improvement in anxiety.    Patient's problems with out of control, driven, impulsive, compulsive sexual behavior began in early . He has been in a stable loving marriage for 40 years then began having covert hook-ups with young men.  Efforts to change problematic behaviors have included several years of psychotherapy and attenance in [facility]. He continues to struggle with recurrent and intense urges which cause him distress.     Client reports experiencing some anxiety/worry and associated distress. He attributes distress to ineffective coping, patterns of avoidance/numbing, and difficulties with functioning in relationships.    Progress Toward Treatment Goals:   Satisfactory progress     Therapeutic Interventions/Treatment Strategies:    Area(s) of treatment focus addressed in this session included Symptom Management, Interpersonal Relationship Skills, and Sexual Health and Wellness    Group members discussed updates in mental, relational, and sexual health and  functioning. Members shared challenges and successes in aligning with sexual health goals and values. Members also discussed coping and self-care. They were supported to provide each other constructive feedback. Client reflective on feedback his wife recently provided to him regarding treatment progress, which was overall positive. He shared recent interactions with her that relate to his history of sexual behavior and her trauma response. He discussed ways in which he is trying to support her. We made plans for client to wrap up therapy group next week.  Psychotherapist offered support, feedback and validation and reinforced use of skills Treatment modalities used include [facility] THERAPY INTERVENTIONS: Cognitive Behavioral Therapy  Support and Feedback    Patient Response:   Patient responded to session by accepting feedback, giving feedback, listening, focusing on goals, and accepting support  Possible barriers to participation / learning include: N/A    Current Mental Status Exam:   Appearance:  Appropriate   Eye Contact:  Good   Attitude / Demeanor: Friendly  Speech      Rate / Production: Normal/ Responsive      Volume:  Normal  volume  Orientation:  All  Mood:   Normal  Affect:   Appropriate   Thought Content: Clear   Insight:   Good       Plan/Need for Future Services:  Return for therapy in 1 week to treat diagnosed problems.    Patient has a current master individualized treatment plan.  See Epic treatment plan for more information.    Referral / Collaboration:  Referral to another professional/service is not indicated at this time..  Emergency Services Needed?  No    Assignment:  Plan for ending group    Interactive Complexity:  There are four specific communication difficulties that complicate the work of the primary psychiatric procedure.  Interactive complexity (+91304) may be reported when at least one of these difficulties is present.    Communication difficulties present during current the psychiatric  procedure include:  None.      Signature/Title:      Mason Adkins, PhD, LP    [place] for Sexual and Gender Health, Sexual and Gender Health Clinic  Department of Family Medicine and Community Health  [facility]

## 2025-03-06 ENCOUNTER — OFFICE VISIT (OUTPATIENT)
Dept: PSYCHOLOGY | Facility: CLINIC | Age: 70
End: 2025-03-06
Payer: COMMERCIAL

## 2025-03-06 DIAGNOSIS — F91.8 CONDUCT DISORDER, UNDIFFERENTIATED TYPE: Primary | ICD-10-CM

## 2025-03-06 DIAGNOSIS — Z78.9 ALCOHOL USE: ICD-10-CM

## 2025-03-06 DIAGNOSIS — F41.9 ANXIETY DISORDER, UNSPECIFIED TYPE: ICD-10-CM

## 2025-03-06 NOTE — GROUP NOTE
Gender and Sexual Health Clinic  [address], [address]  [place]  [address]    Group Progress Note  Gender and Sexual Health Clinic - Progress Note    Date of Service: 3/06/25   Name: Ramin Murcia  : 1955  Medical Record Number: [id number]  START TIME:  4:00 PM  END TIME:  6:00 PM  FACILITATOR(S): Mason Adkins, PhD; Nithya Lopez, PhD  TOPIC: [facility] Group Therapy  Type of Session: Group  :  Number of Attendees:  6  Number of Minutes:  120    SERVICE MODALITY:  In-person      DSM-5 Diagnoses:  Encounter Diagnoses   Name Primary?    Unspecified disruptive, impulse control, and conduct disorder (hypersexuality) Yes    Anxiety disorder, unspecified type     Alcohol use        Current Reported Symptoms and Status update:  Changes since last session includes no boundary violations, improvement in anxiety and alcohol, planning to end group today.    Patient's problems with out of control, driven, impulsive, compulsive sexual behavior began in early . He has been in a stable loving marriage for 40 years then began having covert hook-ups with young men.  Efforts to change problematic behaviors have included several years of psychotherapy and attenance in [facility]. He continues to struggle with recurrent and intense urges which cause him distress.     Client reports experiencing some anxiety/worry and associated distress. He attributes distress to ineffective coping, patterns of avoidance/numbing, and difficulties with functioning in relationships.    Progress Toward Treatment Goals:   Satisfactory progress     Therapeutic Interventions/Treatment Strategies:    Area(s) of treatment focus addressed in this session included Symptom Management, Interpersonal Relationship Skills, and Sexual Health and Wellness    Group members checked in on self-care, mental and sexual health, and maintaining boundaries against sexual compulsivity. Members provided each other supportive feedback. The remainder of  the group was focused on ending care for client. He provided feedback to group members about what he learned from them in group. Members also provided him supportive final feedback and reflected on their relationships with him.  Psychotherapist offered support, feedback and validation and reinforced use of skills Treatment modalities used include [facility] THERAPY INTERVENTIONS: Cognitive Behavioral Therapy  Support and Feedback    Patient Response:   Patient responded to session by accepting feedback, giving feedback, listening, focusing on goals, and accepting support  Possible barriers to participation / learning include: N/A    Current Mental Status Exam:   Appearance:  Appropriate   Eye Contact:  Good   Attitude / Demeanor: Friendly  Speech      Rate / Production: Normal/ Responsive      Volume:  Normal  volume  Orientation:  All  Mood:   Normal  Affect:   Appropriate   Thought Content: Clear   Insight:   Good       Plan/Need for Future Services:     Patient has See Epic Treatment Plan - Patient is discharging.    Referral / Collaboration:  Referral to another professional/service is not indicated at this time..  Emergency Services Needed?  No    Assignment:  Self-care  Maintenance plan  Return for individual therapy as needed    Interactive Complexity:  There are four specific communication difficulties that complicate the work of the primary psychiatric procedure.  Interactive complexity (+58521) may be reported when at least one of these difficulties is present.    Communication difficulties present during current the psychiatric procedure include:  None.      Signature/Title:      Mason Adkins, PhD, LP    [place] for Sexual and Gender Health, Sexual and Gender Health Clinic  Department of Family Medicine and Community Health  [employer] [facility] [employer]

## 2025-04-15 ENCOUNTER — OFFICE VISIT (OUTPATIENT)
Dept: PSYCHOLOGY | Facility: CLINIC | Age: 70
End: 2025-04-15
Payer: COMMERCIAL

## 2025-04-15 DIAGNOSIS — F10.90 ALCOHOL USE: ICD-10-CM

## 2025-04-15 DIAGNOSIS — F41.9 ANXIETY DISORDER, UNSPECIFIED TYPE: Primary | ICD-10-CM

## 2025-04-15 NOTE — PROGRESS NOTES
[facility] - Progress Note    Date of Service: 4/15/25   Name: Ramin Murcia  : 1955  Medical Record Number: [id number]  Treating Provider: Mason Adkins, PhD  Type of Session: Individual  Present in Session: Client and therapist  Session Start and Stop Time: 3:05pm-3:45pm  Number of Minutes:  40    SERVICE MODALITY:  In-person    DSM-5 Diagnoses:  Encounter Diagnoses   Name Primary?    Anxiety disorder, unspecified type Yes    Alcohol use          Current Reported Symptoms and Status update:  Changes since last session includes maintaining treatment progress for nonparaphilic compulsive sexual behavior; improving connection with his wife; staying within sexual boundaries; addressing alcohol use concerns; anxiety/worry about sociopolitical climate.      Client reports experiencing some anxiety/worry and associated distress. He attributes distress to ineffective coping, patterns of avoidance/numbing, and difficulties with functioning in relationships.    Progress Toward Treatment Goals:   Stable/Maintenance of progress     Therapeutic Interventions/Treatment Strategies:    Area(s) of treatment focus addressed in this session included Symptom Management, Interpersonal Relationship Skills, and Sexual Health and Wellness    Client processed recent stressors and effects on anxiety and wellbeing. He shared plans to potentially move back to [place] with his wife this fall. Client discussed values around political issues and steps he can take in line with his values. He reflected on treatment progress and helpful components of therapy in meeting his goals. He started a support group to address alcohol concerns. We made plans for client to start aftercare support group here in May.     Psychotherapist offered support, feedback and validation and reinforced use of skills Treatment modalities used include Cognitive Behavioral Therapy  Support and Feedback    Patient Response:   Patient  responded to session by accepting feedback, giving feedback, listening, focusing on goals, and accepting support  Possible barriers to participation / learning include: N/A    Current Mental Status Exam:   Appearance:  Appropriate   Eye Contact:  Good   Attitude / Demeanor: Friendly  Speech      Rate / Production: Normal/ Responsive      Volume:  Normal  volume  Orientation:  All  Mood:   Normal  Affect:   Appropriate   Thought Content: Clear   Insight:   Good       Plan/Need for Future Services:  Return for therapy in 2-4 weeks to treat diagnosed problems.    Patient has a current master individualized treatment plan.  See Epic treatment plan for more information.    Referral / Collaboration:  Referral to another professional/service is not indicated at this time..  Emergency Services Needed?  No    Assignment:  Self-care    Interactive Complexity:  There are four specific communication difficulties that complicate the work of the primary psychiatric procedure.  Interactive complexity (+21931) may be reported when at least one of these difficulties is present.    Communication difficulties present during current the psychiatric procedure include:  None.      Mason Adkins, PhD, LP    [place] [facility], [facility]  Department of Family Medicine and Community Health  [facility] [employer]

## 2025-04-27 ENCOUNTER — HEALTH MAINTENANCE LETTER (OUTPATIENT)
Age: 70
End: 2025-04-27

## 2025-05-21 ENCOUNTER — VIRTUAL VISIT (OUTPATIENT)
Dept: PSYCHOLOGY | Facility: CLINIC | Age: 70
End: 2025-05-21
Payer: COMMERCIAL

## 2025-05-21 DIAGNOSIS — F91.8 CONDUCT DISORDER, UNDIFFERENTIATED TYPE: ICD-10-CM

## 2025-05-21 DIAGNOSIS — F10.90 ALCOHOL USE: ICD-10-CM

## 2025-05-21 DIAGNOSIS — F41.9 ANXIETY DISORDER, UNSPECIFIED TYPE: Primary | ICD-10-CM

## 2025-05-21 NOTE — GROUP NOTE
[facility]  [address], [address]  [place]  [address]    Group Progress Note  [facility] Clinic - Progress Note    Date of Service: 25   Name: Ramin Murcia  : 1955  Medical Record Number: [id number]  START TIME:  4:00 PM  END TIME:  6:00 PM  FACILITATOR(S): Mason Adkins, PhD; Nithya Lopez, PhD  TOPIC: [facility] Group Therapy  Type of Session: Group  :  Number of Attendees:  8  Number of Minutes:  120    SERVICE MODALITY:  Video Visit:      Provider verified identity through the following two step process.  Patient provided:  Patient is known previously to provider and Patient was verified at admission/transfer    Telemedicine Visit: The patient's condition can be safely assessed and treated via synchronous audio and visual telemedicine encounter.      Reason for Telemedicine Visit: Services only offered telehealth    Originating Site (Patient Location): Patient's home    Distant Site (Provider Location): [facility]: Sexual and Gender Health Clinic    Consent:  The patient/guardian has verbally consented to: the potential risks and benefits of telemedicine (video visit) versus in person care; bill my insurance or make self-payment for services provided; and responsibility for payment of non-covered services.     Patient would like the video invitation sent by:  Other e-mail: see chart    Mode of Communication:  Video Conference via Medical Zoom    Distant Location (Provider):  On-site    As the provider I attest to compliance with applicable laws and regulations related to telemedicine.    DSM-5 Diagnoses:  Encounter Diagnoses   Name Primary?    Anxiety disorder, unspecified type Yes    Unspecified disruptive, impulse control, and conduct disorder (hypersexuality)     Alcohol use          Current Reported Symptoms and Status update:  Changes since last session includes improvement in anxiety and maintaining sexual health goals.  Started group today. Continued alcohol use and navigating changes he is exploring for alcohol use.    Progress Toward Treatment Goals:   Stable/Maintenance of progress     Therapeutic Interventions/Treatment Strategies:    Area(s) of treatment focus addressed in this session included Symptom Management, Interpersonal Relationship Skills, and Sexual Health and Wellness    Group members introduced themselves to client and he introduced himself today. We reviewed group rules/expectations. Members shared updates on mental health, sexual health, relationship functioning, and self-care. Members discussed how they are maintaining progress on treatment goals. Client shared what he is working on with regard to CSB and alcohol use. He was an active participant in group.   Psychotherapist offered support, feedback and validation and reinforced use of skills Treatment modalities used include [facility] THERAPY INTERVENTIONS: Cognitive Behavioral Therapy  Support and Feedback    Patient Response:   Patient responded to session by accepting feedback, giving feedback, listening, focusing on goals, and accepting support  Possible barriers to participation / learning include: N/A    Current Mental Status Exam:   Appearance:  Appropriate   Eye Contact:  Good   Attitude / Demeanor: Friendly  Speech      Rate / Production: Normal/ Responsive      Volume:  Normal  volume  Orientation:  All  Mood:   Normal  Affect:   Appropriate   Thought Content: Clear   Insight:   Good         Plan/Need for Future Services:  Return for therapy in 4 weeks to treat diagnosed problems.    Patient has a current master individualized treatment plan.  See Epic treatment plan for more information.    Referral / Collaboration:  Referral to another professional/service is not indicated at this time..  Emergency Services Needed?  No    Assignment:  Self-care    Interactive Complexity:  There are four specific communication difficulties that complicate the work of the  primary psychiatric procedure.  Interactive complexity (+75933) may be reported when at least one of these difficulties is present.    Communication difficulties present during current the psychiatric procedure include:  None.        Mason Adkins, PhD, LP    [place] [facility], Sexual [facility] Gender Health Clinic  Department of Family Medicine and Community Health  [facility]

## 2025-06-18 ENCOUNTER — VIRTUAL VISIT (OUTPATIENT)
Dept: PSYCHOLOGY | Facility: CLINIC | Age: 70
End: 2025-06-18
Payer: COMMERCIAL

## 2025-06-18 DIAGNOSIS — F10.90 ALCOHOL USE: ICD-10-CM

## 2025-06-18 DIAGNOSIS — F41.9 ANXIETY DISORDER, UNSPECIFIED TYPE: Primary | ICD-10-CM

## 2025-06-18 DIAGNOSIS — F91.8 CONDUCT DISORDER, UNDIFFERENTIATED TYPE: ICD-10-CM

## 2025-06-18 NOTE — GROUP NOTE
Gender and Sexual [facility] Clinic  [address], [address]  [place]  [address]    Group Progress Note  Gender and Sexual Health Clinic - Progress Note    Date of Service: 25   Name: Ramin Murcia  : 1955  Medical Record Number: [id number]  START TIME:  4:00 PM  END TIME:  6:00 PM  FACILITATOR(S): Mason Adkins, PhD; Nithya Lopez, PhD  TOPIC: [facility] Group Therapy  Type of Session: Group  :  Number of Attendees:  7  Number of Minutes:  120    SERVICE MODALITY:  Video Visit:      Provider verified identity through the following two step process.  Patient provided:  Patient is known previously to provider and Patient was verified at admission/transfer    Telemedicine Visit: The patient's condition can be safely assessed and treated via synchronous audio and visual telemedicine encounter.      Reason for Telemedicine Visit: Patient has requested telehealth visit    Originating Site (Patient Location): Patient's home    Distant Site (Provider Location): [facility]    Consent:  The patient/guardian has verbally consented to: the potential risks and benefits of telemedicine (video visit) versus in person care; bill my insurance or make self-payment for services provided; and responsibility for payment of non-covered services.     Patient would like the video invitation sent by:  My Chart    Mode of Communication:  Video Conference via Zoom    Distant Location (Provider):  On-site    As the provider I attest to compliance with applicable laws and regulations related to telemedicine.      DSM-5 Diagnoses:  Encounter Diagnoses   Name Primary?    Anxiety disorder, unspecified type Yes    Alcohol use     Unspecified disruptive, impulse control, and conduct disorder (hypersexuality)          Current Reported Symptoms and Status update:  Changes since last session includes maintaining sexual health goals, reduction in alcohol use (using moderately), some  anxiety and stress.    Progress Toward Treatment Goals:   Stable/Maintenance of progress     Therapeutic Interventions/Treatment Strategies:    Area(s) of treatment focus addressed in this session included Symptom Management, Interpersonal Relationship Skills, and Sexual Health and Wellness    Group members checked in about maintenance of treatment goals, boundaries, sexual health, and mental health. Members also discussed relationship functioning and social support. Members were encouraged to provide each other supportive feedback. Client shared improvements he is making in health behavior and maintaining sexual health goals. He reflected on aging, physicality, and sense of self worth. He shared his awareness of his negative cycle and how he is reducing risk factors.   Psychotherapist offered support, feedback and validation and reinforced use of skills Treatment modalities used include [facility] THERAPY INTERVENTIONS: Cognitive Behavioral Therapy  Support and Feedback    Patient Response:   Patient responded to session by accepting feedback, giving feedback, listening, focusing on goals, and accepting support  Possible barriers to participation / learning include: N/A    Current Mental Status Exam:   Appearance:  Appropriate   Eye Contact:  Good   Attitude / Demeanor: Friendly  Speech      Rate / Production: Normal/ Responsive      Volume:  Normal  volume  Orientation:  All  Mood:   Normal  Affect:   Appropriate   Thought Content: Clear   Insight:   Good         Plan/Need for Future Services:  Return for therapy in 4 weeks to treat diagnosed problems.    Patient has a current master individualized treatment plan.  See Epic treatment plan for more information.    Referral / Collaboration:  Referral to another professional/service is not indicated at this time..  Emergency Services Needed?  No    Assignment:  Continue self care    Interactive Complexity:  There are four specific communication difficulties that complicate  the work of the primary psychiatric procedure.  Interactive complexity (+03795) may be reported when at least one of these difficulties is present.    Communication difficulties present during current the psychiatric procedure include:  None.      Mason Adkins, PhD, LP    [place] [facility], Sexual and Gender Health Clinic  Department of Family Medicine and Community Health  [facility]

## 2025-07-16 ENCOUNTER — VIRTUAL VISIT (OUTPATIENT)
Dept: PSYCHOLOGY | Facility: CLINIC | Age: 70
End: 2025-07-16
Payer: COMMERCIAL

## 2025-07-16 DIAGNOSIS — F10.90 ALCOHOL USE: ICD-10-CM

## 2025-07-16 DIAGNOSIS — F41.9 ANXIETY DISORDER, UNSPECIFIED TYPE: Primary | ICD-10-CM

## 2025-07-16 DIAGNOSIS — F91.8 CONDUCT DISORDER, UNDIFFERENTIATED TYPE: ICD-10-CM

## 2025-07-16 PROCEDURE — 90853 GROUP PSYCHOTHERAPY: CPT | Mod: 95 | Performed by: STUDENT IN AN ORGANIZED HEALTH CARE EDUCATION/TRAINING PROGRAM

## 2025-07-16 NOTE — GROUP NOTE
Gender and Sexual Health Clinic  [address], [address]  [place]  [address]    Group Progress Note  Gender and Sexual Health Clinic - Progress Note    Date of Service: 25   Name: Ramin Murcia  : 1955  Medical Record Number: [id number]  START TIME:  4:00 PM  END TIME:  6:00 PM  FACILITATOR(S): Mason Adkins, PhD; Nithya Lopez, PhD  TOPIC: [facility] Group Therapy  Type of Session: Group  :  Number of Attendees:  10  Number of Minutes:  120    SERVICE MODALITY:  Video Visit:      Provider verified identity through the following two step process.  Patient provided:  Patient is known previously to provider and Patient was verified at admission/transfer    Telemedicine Visit: The patient's condition can be safely assessed and treated via synchronous audio and visual telemedicine encounter.      Reason for Telemedicine Visit: Patient has requested telehealth visit    Originating Site (Patient Location): Patient's home    Distant Site (Provider Location): [facility]UAL [facility]ER [facility]    Consent:  The patient/guardian has verbally consented to: the potential risks and benefits of telemedicine (video visit) versus in person care; bill my insurance or make self-payment for services provided; and responsibility for payment of non-covered services.     Patient would like the video invitation sent by:  My Chart    Mode of Communication:  Video Conference via Zoom    Distant Location (Provider):  On-site    As the provider I attest to compliance with applicable laws and regulations related to telemedicine.      DSM-5 Diagnoses:  Encounter Diagnoses   Name Primary?    Anxiety disorder, unspecified type Yes    Alcohol use     Unspecified disruptive, impulse control, and conduct disorder (hypersexuality)    '    Current Reported Symptoms and Status update:  Changes since last session includes maintaining sexual health goals, improvement in anxiety, managing sexual desires and  urges, addressing alcohol use.    Patient's problems with out of control, driven, impulsive, compulsive sexual behavior began in early 2020. He has been in a stable loving marriage for 40 years then began having covert hook-ups with young men.  Efforts to change problematic behaviors have included several years of psychotherapy and attenance in [facility]. He continues to struggle with recurrent and intense urges which cause him distress.     Client reports experiencing some anxiety/worry and associated distress. He attributes distress to ineffective coping, patterns of avoidance/numbing, and difficulties with functioning in relationships.    Progress Toward Treatment Goals:   Stable/Maintenance of progress     Therapeutic Interventions/Treatment Strategies:    Area(s) of treatment focus addressed in this session included Symptom Management, Interpersonal Relationship Skills, and Sexual Health and Wellness    Group members shared updates in relationship, mental health, and sexual functioning. Members checked-in on treatment maintenance goals, coping, and self-care. They were encouraged to provide each other supportive feedback. Client shared that he is staying aligned with his sexual health goals, and is experiencing healthy desire/urges. He noted that alcohol use has caused arguments in his marriage and he has decided to stop alcohol use altogether. Client shared how stressors and the sociopolitical climate affect his wellbeing. He continues to work on relationship concerns related to compulsive behavior.      Psychotherapist offered support, feedback and validation and reinforced use of skills Treatment modalities used include [facility] THERAPY INTERVENTIONS: Cognitive Behavioral Therapy  Support and Feedback    Patient Response:   Patient responded to session by accepting feedback, giving feedback, listening, focusing on goals, and accepting support  Possible barriers to participation / learning include: N/A    Current Mental  Status Exam:   Appearance:  Appropriate   Eye Contact:  Good   Attitude / Demeanor: Friendly  Speech      Rate / Production: Normal/ Responsive      Volume:  Normal  volume  Orientation:  All  Mood:   Normal  Affect:   Appropriate   Thought Content: Clear   Insight:   Good         Plan/Need for Future Services:  Return for therapy in 4 weeks to treat diagnosed problems.    Patient has a current master individualized treatment plan.  See Epic treatment plan for more information.    Referral / Collaboration:  Referral to another professional/service is not indicated at this time..  Emergency Services Needed?  No    Assignment:  Continue self-care    Interactive Complexity:  There are four specific communication difficulties that complicate the work of the primary psychiatric procedure.  Interactive complexity (+43881) may be reported when at least one of these difficulties is present.    Communication difficulties present during current the psychiatric procedure include:  None.      Signature/Title:      Mason Adkins, PhD, LP    [place] [facility], Sexual and Gender Health Clinic  Department of Family Medicine and Community Health  [facility] [employer]

## 2025-08-20 ENCOUNTER — VIRTUAL VISIT (OUTPATIENT)
Dept: PSYCHOLOGY | Facility: CLINIC | Age: 70
End: 2025-08-20
Payer: COMMERCIAL

## 2025-08-20 DIAGNOSIS — F10.90 ALCOHOL USE: ICD-10-CM

## 2025-08-20 DIAGNOSIS — F91.8 CONDUCT DISORDER, UNDIFFERENTIATED TYPE: ICD-10-CM

## 2025-08-20 DIAGNOSIS — F41.9 ANXIETY DISORDER, UNSPECIFIED TYPE: Primary | ICD-10-CM

## 2025-08-20 PROCEDURE — 90853 GROUP PSYCHOTHERAPY: CPT | Mod: 4UV
